# Patient Record
Sex: FEMALE | Race: WHITE | NOT HISPANIC OR LATINO | URBAN - METROPOLITAN AREA
[De-identification: names, ages, dates, MRNs, and addresses within clinical notes are randomized per-mention and may not be internally consistent; named-entity substitution may affect disease eponyms.]

---

## 2018-01-05 ENCOUNTER — INPATIENT (INPATIENT)
Facility: HOSPITAL | Age: 68
LOS: 3 days | Discharge: HOME | End: 2018-01-09
Attending: EMERGENCY MEDICINE | Admitting: HOSPITALIST
Payer: MEDICARE

## 2018-01-05 VITALS
HEIGHT: 65 IN | TEMPERATURE: 98 F | OXYGEN SATURATION: 98 % | SYSTOLIC BLOOD PRESSURE: 146 MMHG | WEIGHT: 123.02 LBS | RESPIRATION RATE: 16 BRPM | HEART RATE: 93 BPM | DIASTOLIC BLOOD PRESSURE: 70 MMHG

## 2018-01-05 DIAGNOSIS — Z98.89 OTHER SPECIFIED POSTPROCEDURAL STATES: Chronic | ICD-10-CM

## 2018-01-05 DIAGNOSIS — Z85.828 PERSONAL HISTORY OF OTHER MALIGNANT NEOPLASM OF SKIN: Chronic | ICD-10-CM

## 2018-01-05 DIAGNOSIS — Z94.0 KIDNEY TRANSPLANT STATUS: Chronic | ICD-10-CM

## 2018-01-05 DIAGNOSIS — C44.92 SQUAMOUS CELL CARCINOMA OF SKIN, UNSPECIFIED: ICD-10-CM

## 2018-01-05 DIAGNOSIS — I10 ESSENTIAL (PRIMARY) HYPERTENSION: ICD-10-CM

## 2018-01-05 DIAGNOSIS — Z29.9 ENCOUNTER FOR PROPHYLACTIC MEASURES, UNSPECIFIED: ICD-10-CM

## 2018-01-05 DIAGNOSIS — D25.9 LEIOMYOMA OF UTERUS, UNSPECIFIED: Chronic | ICD-10-CM

## 2018-01-05 DIAGNOSIS — L03.115 CELLULITIS OF RIGHT LOWER LIMB: ICD-10-CM

## 2018-01-05 DIAGNOSIS — Z94.0 KIDNEY TRANSPLANT STATUS: ICD-10-CM

## 2018-01-05 DIAGNOSIS — D84.9 IMMUNODEFICIENCY, UNSPECIFIED: ICD-10-CM

## 2018-01-05 DIAGNOSIS — E78.4 OTHER HYPERLIPIDEMIA: ICD-10-CM

## 2018-01-05 LAB
ALBUMIN SERPL ELPH-MCNC: 3 G/DL — LOW (ref 3.3–5)
ALP SERPL-CCNC: 78 U/L — SIGNIFICANT CHANGE UP (ref 40–120)
ALT FLD-CCNC: 17 U/L — SIGNIFICANT CHANGE UP (ref 12–78)
ANION GAP SERPL CALC-SCNC: 10 MMOL/L — SIGNIFICANT CHANGE UP (ref 5–17)
APTT BLD: 32.2 SEC — SIGNIFICANT CHANGE UP (ref 27.5–37.4)
AST SERPL-CCNC: 16 U/L — SIGNIFICANT CHANGE UP (ref 15–37)
BASOPHILS # BLD AUTO: 0 K/UL — SIGNIFICANT CHANGE UP (ref 0–0.2)
BASOPHILS NFR BLD AUTO: 0.3 % — SIGNIFICANT CHANGE UP (ref 0–2)
BILIRUB SERPL-MCNC: 0.5 MG/DL — SIGNIFICANT CHANGE UP (ref 0.2–1.2)
BUN SERPL-MCNC: 36 MG/DL — HIGH (ref 7–23)
CALCIUM SERPL-MCNC: 9.7 MG/DL — SIGNIFICANT CHANGE UP (ref 8.5–10.1)
CHLORIDE SERPL-SCNC: 106 MMOL/L — SIGNIFICANT CHANGE UP (ref 96–108)
CO2 SERPL-SCNC: 23 MMOL/L — SIGNIFICANT CHANGE UP (ref 22–31)
CREAT SERPL-MCNC: 1.88 MG/DL — HIGH (ref 0.5–1.3)
EOSINOPHIL # BLD AUTO: 0 K/UL — SIGNIFICANT CHANGE UP (ref 0–0.5)
EOSINOPHIL NFR BLD AUTO: 0.5 % — SIGNIFICANT CHANGE UP (ref 0–6)
ERYTHROCYTE [SEDIMENTATION RATE] IN BLOOD: 62 MM/HR — HIGH (ref 0–20)
GLUCOSE SERPL-MCNC: 151 MG/DL — HIGH (ref 70–99)
HCT VFR BLD CALC: 40 % — SIGNIFICANT CHANGE UP (ref 34.5–45)
HGB BLD-MCNC: 12.7 G/DL — SIGNIFICANT CHANGE UP (ref 11.5–15.5)
INR BLD: 1.01 RATIO — SIGNIFICANT CHANGE UP (ref 0.88–1.16)
LACTATE SERPL-SCNC: 1.3 MMOL/L — SIGNIFICANT CHANGE UP (ref 0.7–2)
LACTATE SERPL-SCNC: 2.2 MMOL/L — HIGH (ref 0.7–2)
LYMPHOCYTES # BLD AUTO: 1 K/UL — SIGNIFICANT CHANGE UP (ref 1–3.3)
LYMPHOCYTES # BLD AUTO: 10.6 % — LOW (ref 13–44)
MCHC RBC-ENTMCNC: 29.1 PG — SIGNIFICANT CHANGE UP (ref 27–34)
MCHC RBC-ENTMCNC: 31.8 GM/DL — LOW (ref 32–36)
MCV RBC AUTO: 91.6 FL — SIGNIFICANT CHANGE UP (ref 80–100)
MONOCYTES # BLD AUTO: 0.7 K/UL — SIGNIFICANT CHANGE UP (ref 0–0.9)
MONOCYTES NFR BLD AUTO: 6.9 % — SIGNIFICANT CHANGE UP (ref 2–14)
NEUTROPHILS # BLD AUTO: 7.8 K/UL — HIGH (ref 1.8–7.4)
NEUTROPHILS NFR BLD AUTO: 81.7 % — HIGH (ref 43–77)
PLATELET # BLD AUTO: 233 K/UL — SIGNIFICANT CHANGE UP (ref 150–400)
POTASSIUM SERPL-MCNC: 4.3 MMOL/L — SIGNIFICANT CHANGE UP (ref 3.5–5.3)
POTASSIUM SERPL-SCNC: 4.3 MMOL/L — SIGNIFICANT CHANGE UP (ref 3.5–5.3)
PROT SERPL-MCNC: 7.3 GM/DL — SIGNIFICANT CHANGE UP (ref 6–8.3)
PROTHROM AB SERPL-ACNC: 10.9 SEC — SIGNIFICANT CHANGE UP (ref 9.8–12.7)
RBC # BLD: 4.37 M/UL — SIGNIFICANT CHANGE UP (ref 3.8–5.2)
RBC # FLD: 14 % — SIGNIFICANT CHANGE UP (ref 10.3–14.5)
SODIUM SERPL-SCNC: 139 MMOL/L — SIGNIFICANT CHANGE UP (ref 135–145)
WBC # BLD: 9.6 K/UL — SIGNIFICANT CHANGE UP (ref 3.8–10.5)
WBC # FLD AUTO: 9.6 K/UL — SIGNIFICANT CHANGE UP (ref 3.8–10.5)

## 2018-01-05 PROCEDURE — 93971 EXTREMITY STUDY: CPT | Mod: 26,RT

## 2018-01-05 PROCEDURE — 73590 X-RAY EXAM OF LOWER LEG: CPT | Mod: 26,RT

## 2018-01-05 PROCEDURE — 99285 EMERGENCY DEPT VISIT HI MDM: CPT

## 2018-01-05 RX ORDER — VANCOMYCIN HCL 1 G
1000 VIAL (EA) INTRAVENOUS EVERY 12 HOURS
Qty: 0 | Refills: 0 | Status: DISCONTINUED | OUTPATIENT
Start: 2018-01-05 | End: 2018-01-06

## 2018-01-05 RX ORDER — SIMVASTATIN 20 MG/1
20 TABLET, FILM COATED ORAL AT BEDTIME
Qty: 0 | Refills: 0 | Status: DISCONTINUED | OUTPATIENT
Start: 2018-01-05 | End: 2018-01-09

## 2018-01-05 RX ORDER — HEPARIN SODIUM 5000 [USP'U]/ML
5000 INJECTION INTRAVENOUS; SUBCUTANEOUS EVERY 12 HOURS
Qty: 0 | Refills: 0 | Status: DISCONTINUED | OUTPATIENT
Start: 2018-01-05 | End: 2018-01-09

## 2018-01-05 RX ORDER — VANCOMYCIN HCL 1 G
1000 VIAL (EA) INTRAVENOUS ONCE
Qty: 0 | Refills: 0 | Status: COMPLETED | OUTPATIENT
Start: 2018-01-05 | End: 2018-01-05

## 2018-01-05 RX ORDER — SODIUM CHLORIDE 9 MG/ML
1000 INJECTION INTRAMUSCULAR; INTRAVENOUS; SUBCUTANEOUS
Qty: 0 | Refills: 0 | Status: DISCONTINUED | OUTPATIENT
Start: 2018-01-05 | End: 2018-01-07

## 2018-01-05 RX ORDER — SIROLIMUS 1 MG/ML
1 SOLUTION ORAL DAILY
Qty: 0 | Refills: 0 | Status: DISCONTINUED | OUTPATIENT
Start: 2018-01-05 | End: 2018-01-06

## 2018-01-05 RX ORDER — LOSARTAN POTASSIUM 100 MG/1
100 TABLET, FILM COATED ORAL DAILY
Qty: 0 | Refills: 0 | Status: DISCONTINUED | OUTPATIENT
Start: 2018-01-05 | End: 2018-01-09

## 2018-01-05 RX ORDER — ASPIRIN/CALCIUM CARB/MAGNESIUM 324 MG
81 TABLET ORAL DAILY
Qty: 0 | Refills: 0 | Status: DISCONTINUED | OUTPATIENT
Start: 2018-01-05 | End: 2018-01-09

## 2018-01-05 RX ORDER — AMLODIPINE BESYLATE 2.5 MG/1
5 TABLET ORAL DAILY
Qty: 0 | Refills: 0 | Status: DISCONTINUED | OUTPATIENT
Start: 2018-01-05 | End: 2018-01-09

## 2018-01-05 RX ORDER — SODIUM CHLORIDE 9 MG/ML
2000 INJECTION INTRAMUSCULAR; INTRAVENOUS; SUBCUTANEOUS ONCE
Qty: 0 | Refills: 0 | Status: COMPLETED | OUTPATIENT
Start: 2018-01-05 | End: 2018-01-05

## 2018-01-05 RX ORDER — ONDANSETRON 8 MG/1
4 TABLET, FILM COATED ORAL EVERY 6 HOURS
Qty: 0 | Refills: 0 | Status: DISCONTINUED | OUTPATIENT
Start: 2018-01-05 | End: 2018-01-09

## 2018-01-05 RX ORDER — PETROLATUM,WHITE
1 JELLY (GRAM) TOPICAL
Qty: 0 | Refills: 0 | Status: DISCONTINUED | OUTPATIENT
Start: 2018-01-05 | End: 2018-01-09

## 2018-01-05 RX ADMIN — Medication 250 MILLIGRAM(S): at 19:30

## 2018-01-05 RX ADMIN — SODIUM CHLORIDE 1000 MILLILITER(S): 9 INJECTION INTRAMUSCULAR; INTRAVENOUS; SUBCUTANEOUS at 18:00

## 2018-01-05 RX ADMIN — LOSARTAN POTASSIUM 100 MILLIGRAM(S): 100 TABLET, FILM COATED ORAL at 23:07

## 2018-01-05 RX ADMIN — HEPARIN SODIUM 5000 UNIT(S): 5000 INJECTION INTRAVENOUS; SUBCUTANEOUS at 23:07

## 2018-01-05 RX ADMIN — SIMVASTATIN 20 MILLIGRAM(S): 20 TABLET, FILM COATED ORAL at 23:07

## 2018-01-05 NOTE — H&P ADULT - ASSESSMENT
67F w/ PMH HTN, SCC of skin multiple over body, PCKD w/p renal transplant 2000, presents c/o worsening RLE cellulitis.  h/o MRSA    labs: LA 2.2-->1.3 after 2 L NS bolus  Cr 1.88, about baseline per pt   doppler of RLE is neg.   Given 1 dose vanco

## 2018-01-05 NOTE — ED ADULT TRIAGE NOTE - CHIEF COMPLAINT QUOTE
C/O right leg worsening infection. Patient reports her doctor Dr. Talita Patel advised her to come to ED if infection in RLE got worse. Patient reports she has MRSA. Currently on Doxycycline. Hx of Radiation to neck x 3 weeks ago, kidney recipient.

## 2018-01-05 NOTE — H&P ADULT - NEUROLOGICAL DETAILS
responds to verbal commands/responds to pain/alert and oriented x 3/normal strength/sensation intact/cranial nerves intact

## 2018-01-05 NOTE — ED PROVIDER NOTE - PMH
Hyperlipidemia    Hypertension    MVP (mitral valve prolapse)    Polycystic kidney disease    Raynaud's disease    Squamous cell carcinoma  multiple extensive lesions of torso-all extremities-face/scalp

## 2018-01-05 NOTE — H&P ADULT - SKIN COMMENTS
multiple areas of Skin SCC.  Radiation lesion over left neck. RLE open pustule w/ surrounding erythema, tenderness.  no visible rash

## 2018-01-05 NOTE — ED PROVIDER NOTE - OBJECTIVE STATEMENT
68 y/o female with a PMHx of HTN, squamous cell carcinoma, s/p kidney transplant (2000), Polycystic kidney disease on dialysis presents to the ED regarding worsening skin lesions on her right leg x3 days. Pt first noticed a small skin lesion 3 days ago, and then went to her dermatologist, Dr. Talita Patel 2 days ago where the pt was given doxycycline and Bacitracin. Pt was advised to come to ED if her lesion did not improve. Today, the pt noticed that the lesion worsened, and came to the ED after her PMD was not available. Pt reports increased erythema, stiffness, and trouble bearing weight on her right side. No SOB, abd pain, nausea, vomiting, diarrhea. Pt was seen at  ED one year ago for the same problem. Pt finished radiation 1 month ago after having 36 cervical lymph nodes removed on the left side. No anti-coagulants. Internist: Dr. Bello. Nephrologist: Dr. Plascencia. 66 y/o female with a PMHx of HTN, squamous cell carcinoma, s/p kidney transplant (2000), Polycystic kidney disease on dialysis presents to the ED regarding worsening skin lesions on her right leg x3 days. Pt first noticed a small skin lesion 3 days ago, and then went to her dermatologist, Dr. Talita Patel, 2 days ago where the pt was given doxycycline and Bacitracin. Pt was advised to come to ED if her lesions did not improve. Today, the pt noticed that the lesions worsened, and came to the ED after her PMD was not available. Pt reports increased erythema, stiffness, and trouble bearing weight on her right side. No SOB, abd pain, nausea, vomiting, diarrhea. Pt was seen at  ED one year ago for the same problem. Pt finished radiation 1 month ago after having 36 cervical lymph nodes removed on the left side. No anti-coagulants. Internist: Dr. Bello. Nephrologist: Dr. Plascencia.

## 2018-01-05 NOTE — H&P ADULT - NSHPLABSRESULTS_GEN_ALL_CORE
Labs personally reviewed.                          12.7   9.6   )-----------( 233      ( 05 Jan 2018 17:00 )             40.0       01-05    139  |  106  |  36<H>  ----------------------------<  151<H>  4.3   |  23  |  1.88<H>    Ca    9.7      05 Jan 2018 17:00    TPro  7.3  /  Alb  3.0<L>  /  TBili  0.5  /  DBili  x   /  AST  16  /  ALT  17  /  AlkPhos  78  01-05        PT/INR - ( 05 Jan 2018 17:00 )   PT: 10.9 sec;   INR: 1.01 ratio         PTT - ( 05 Jan 2018 17:00 )  PTT:32.2 sec    Lactate Trend  01-05 @ 20:55 Lactate:1.3   01-05 @ 17:08 Lactate:2.2     Imaging - pending tib/fib xray. RLE doppler neg for dvt

## 2018-01-05 NOTE — ED PROVIDER NOTE - PSH
Fibroid uterus    H/O lumpectomy  left  History of incisional hernia repair    History of kidney transplant  left  History of Mohs surgery for squamous cell carcinoma of skin  recent left arm procedure-multiple Moh's procedure in the past

## 2018-01-05 NOTE — H&P ADULT - HISTORY OF PRESENT ILLNESS
67F w/ PMH HTN, SCC of skin multiple over body, PCKD w/p renal transplant 2000, presents c/o worsening "mrsa" infection of Rt Leg.  4 days ago, pt noted a pustule over her Rt lower leg and opened it on her own.  She's had similar episode in the past that was diagnosed w/ MRSA.  She had an appt w/ her dermatologist 2 days ago and was prescribed doxycycline and bacitracin.  Pt presents today d/t worsening "redness"  and pain, she's unable to walk.  She denies fever, chills.      In ED: doppler of RLE is neg. Given 1 dose vanco    Vital Signs Last 24 Hrs  T(C): 36.6 (05 Jan 2018 21:48), Max: 36.7 (05 Jan 2018 18:02)  T(F): 97.9 (05 Jan 2018 21:48), Max: 98 (05 Jan 2018 18:02)  HR: 82 (05 Jan 2018 21:48) (80 - 93)  BP: 133/69 (05 Jan 2018 21:48) (121/61 - 146/70)  RR: 20 (05 Jan 2018 21:48) (16 - 20)  SpO2: 96% (05 Jan 2018 21:48) (96% - 98%)

## 2018-01-06 LAB
ANION GAP SERPL CALC-SCNC: 8 MMOL/L — SIGNIFICANT CHANGE UP (ref 5–17)
BUN SERPL-MCNC: 28 MG/DL — HIGH (ref 7–23)
CALCIUM SERPL-MCNC: 9.2 MG/DL — SIGNIFICANT CHANGE UP (ref 8.5–10.1)
CHLORIDE SERPL-SCNC: 112 MMOL/L — HIGH (ref 96–108)
CO2 SERPL-SCNC: 23 MMOL/L — SIGNIFICANT CHANGE UP (ref 22–31)
CREAT SERPL-MCNC: 1.38 MG/DL — HIGH (ref 0.5–1.3)
GLUCOSE SERPL-MCNC: 79 MG/DL — SIGNIFICANT CHANGE UP (ref 70–99)
HCT VFR BLD CALC: 37.8 % — SIGNIFICANT CHANGE UP (ref 34.5–45)
HCV AB S/CO SERPL IA: 0.19 S/CO — SIGNIFICANT CHANGE UP
HCV AB SERPL-IMP: SIGNIFICANT CHANGE UP
HGB BLD-MCNC: 12.1 G/DL — SIGNIFICANT CHANGE UP (ref 11.5–15.5)
LACTATE SERPL-SCNC: 0.9 MMOL/L — SIGNIFICANT CHANGE UP (ref 0.7–2)
MCHC RBC-ENTMCNC: 29 PG — SIGNIFICANT CHANGE UP (ref 27–34)
MCHC RBC-ENTMCNC: 32 GM/DL — SIGNIFICANT CHANGE UP (ref 32–36)
MCV RBC AUTO: 90.8 FL — SIGNIFICANT CHANGE UP (ref 80–100)
PLATELET # BLD AUTO: 176 K/UL — SIGNIFICANT CHANGE UP (ref 150–400)
POTASSIUM SERPL-MCNC: 3.7 MMOL/L — SIGNIFICANT CHANGE UP (ref 3.5–5.3)
POTASSIUM SERPL-SCNC: 3.7 MMOL/L — SIGNIFICANT CHANGE UP (ref 3.5–5.3)
RBC # BLD: 4.16 M/UL — SIGNIFICANT CHANGE UP (ref 3.8–5.2)
RBC # FLD: 13.8 % — SIGNIFICANT CHANGE UP (ref 10.3–14.5)
SODIUM SERPL-SCNC: 143 MMOL/L — SIGNIFICANT CHANGE UP (ref 135–145)
WBC # BLD: 5.8 K/UL — SIGNIFICANT CHANGE UP (ref 3.8–10.5)
WBC # FLD AUTO: 5.8 K/UL — SIGNIFICANT CHANGE UP (ref 3.8–10.5)

## 2018-01-06 RX ORDER — VANCOMYCIN HCL 1 G
750 VIAL (EA) INTRAVENOUS EVERY 24 HOURS
Qty: 0 | Refills: 0 | Status: DISCONTINUED | OUTPATIENT
Start: 2018-01-06 | End: 2018-01-09

## 2018-01-06 RX ORDER — CHOLECALCIFEROL (VITAMIN D3) 125 MCG
2000 CAPSULE ORAL DAILY
Qty: 0 | Refills: 0 | Status: DISCONTINUED | OUTPATIENT
Start: 2018-01-06 | End: 2018-01-09

## 2018-01-06 RX ORDER — METOPROLOL TARTRATE 50 MG
100 TABLET ORAL
Qty: 0 | Refills: 0 | Status: DISCONTINUED | OUTPATIENT
Start: 2018-01-06 | End: 2018-01-09

## 2018-01-06 RX ORDER — SIROLIMUS 1 MG/ML
0.5 SOLUTION ORAL DAILY
Qty: 0 | Refills: 0 | Status: DISCONTINUED | OUTPATIENT
Start: 2018-01-06 | End: 2018-01-07

## 2018-01-06 RX ADMIN — LOSARTAN POTASSIUM 100 MILLIGRAM(S): 100 TABLET, FILM COATED ORAL at 17:47

## 2018-01-06 RX ADMIN — Medication 1 APPLICATION(S): at 12:04

## 2018-01-06 RX ADMIN — SIMVASTATIN 20 MILLIGRAM(S): 20 TABLET, FILM COATED ORAL at 22:15

## 2018-01-06 RX ADMIN — HEPARIN SODIUM 5000 UNIT(S): 5000 INJECTION INTRAVENOUS; SUBCUTANEOUS at 17:46

## 2018-01-06 RX ADMIN — Medication 1 APPLICATION(S): at 17:46

## 2018-01-06 RX ADMIN — SODIUM CHLORIDE 125 MILLILITER(S): 9 INJECTION INTRAMUSCULAR; INTRAVENOUS; SUBCUTANEOUS at 17:46

## 2018-01-06 RX ADMIN — Medication 1 APPLICATION(S): at 22:18

## 2018-01-06 RX ADMIN — SODIUM CHLORIDE 125 MILLILITER(S): 9 INJECTION INTRAMUSCULAR; INTRAVENOUS; SUBCUTANEOUS at 08:24

## 2018-01-06 RX ADMIN — SIROLIMUS 1 MILLIGRAM(S): 1 SOLUTION ORAL at 09:35

## 2018-01-06 RX ADMIN — Medication 2000 UNIT(S): at 12:04

## 2018-01-06 RX ADMIN — Medication 5 MILLIGRAM(S): at 06:48

## 2018-01-06 RX ADMIN — HEPARIN SODIUM 5000 UNIT(S): 5000 INJECTION INTRAVENOUS; SUBCUTANEOUS at 06:46

## 2018-01-06 RX ADMIN — Medication 100 MILLIGRAM(S): at 12:04

## 2018-01-06 RX ADMIN — Medication 250 MILLIGRAM(S): at 06:50

## 2018-01-06 RX ADMIN — Medication 1 APPLICATION(S): at 08:21

## 2018-01-06 RX ADMIN — Medication 81 MILLIGRAM(S): at 09:29

## 2018-01-06 RX ADMIN — AMLODIPINE BESYLATE 5 MILLIGRAM(S): 2.5 TABLET ORAL at 17:46

## 2018-01-06 NOTE — CONSULT NOTE ADULT - ASSESSMENT
67F w/ PMH HTN, SCC of skin multiple over body, PCKD w/p renal transplant 2000, presents c/o worsening "mrsa" infection of Rt Leg.  4 days ago, pt noted a pustule over her Rt lower leg and opened it on her own.  She's had similar episode in the past that was diagnosed w/ MRSA.  She had an appt w/ her dermatologist 2 days ago and was prescribed doxycycline and bacitracin.  Pt presents today d/t worsening "redness"  and pain, she's unable to walk.  She denies fever, chills.  In ED: doppler of RLE is neg. Given 1 dose vanco    1. RLE cellulitis/hx of MRSA skin abscesses/lesions/renal tx on chronic immunosuppression/skin ca/CKD  - given 1 dose of vancomycin 1gm 1/5-1/6  - will continue with vancomycin, renally-dose adjusted [id number] based on crcl, check trough prior to 4th dose  - monitor for evolving abscess  - US (-)   - f/u cultures  - monitor temps  -tolerating abx well so far; no side effects noted  -reason for abx use and side effects reviewed with patient  - supportive care  - f/u cbc    2. other issues - care per medicine

## 2018-01-06 NOTE — CONSULT NOTE ADULT - SUBJECTIVE AND OBJECTIVE BOX
pt known to me with hx of CKD and renal transplant presents with a chief complaint of Right leg "mrsa"     HPI:  67F w/ PMH HTN, SCC of skin multiple sites over body, PCKD w s/p renal transplant 2000, presents c/o worsening RLE infection of Rt Leg.  4 days ago treated with doxycycline per her dermatologist,.  pt noted a pustule over her Rt lower leg and opened it on her own.  She's had similar episode in the past that was diagnosed w/ MRSA.  She had an appt w/ her dermatologist 2 days ago and was prescribed doxycycline and bacitracin.  Pt presents  d/t worsening "redness"  and pain, she's unable to walk.  She denies fever, chills.   In ED: doppler of RLE is neg. Given 1 dose vanco.     Seen at bedside, right lower extremity erythematous and warm with pustular lesion has improved  Offers no other complaints.      PAST MEDICAL & SURGICAL HISTORY:  MVP (mitral valve prolapse)  Raynaud's disease  Squamous cell carcinoma: multiple extensive lesions of torso-all extremities-face/scalp  Hypertension  Hyperlipidemia  Polycystic kidney disease  Fibroid uterus  H/O lumpectomy: left  History of incisional hernia repair  History of kidney transplant: left  History of Mohs surgery for squamous cell carcinoma of skin: recent left arm procedure-multiple Moh&#x27;s procedure in the past      MEDICATIONS  (STANDING):  amLODIPine   Tablet 5 milliGRAM(s) Oral daily  AQUAPHOR (petrolatum Ointment) 1 Application(s) Topical four times a day  aspirin enteric coated 81 milliGRAM(s) Oral daily  cholecalciferol 2000 Unit(s) Oral daily  heparin  Injectable 5000 Unit(s) SubCutaneous every 12 hours  losartan 100 milliGRAM(s) Oral daily  metoprolol     tartrate 100 milliGRAM(s) Oral two times a day  predniSONE   Tablet 5 milliGRAM(s) Oral daily  simvastatin 20 milliGRAM(s) Oral at bedtime  sirolimus 0.5 milliGRAM(s) Oral daily  sodium chloride 0.9%. 1000 milliLiter(s) (125 mL/Hr) IV Continuous <Continuous>  vancomycin  IVPB 750 milliGRAM(s) IV Intermittent every 24 hours      Allergies    bacitracin (Rash)  Levaquin (Sedation/Somnol; Muscle Pain)  tetracyclines (Other)    Intolerances    SOCIAL HISTORY:  Denies ETOh,Smoking,     FAMILY HISTORY:  No pertinent family history in first degree relatives      REVIEW OF SYSTEMS:    CONSTITUTIONAL: No weakness, fevers or chills  EYES/ENT: No visual changes;  No vertigo or throat pain   NECK: No pain or stiffness  RESPIRATORY: No cough, wheezing, hemoptysis; No shortness of breath  CARDIOVASCULAR: No chest pain or palpitations  GASTROINTESTINAL: No abdominal or epigastric pain. No nausea, vomiting, or hematemesis; No diarrhea or constipation. No melena or hematochezia.  GENITOURINARY: No dysuria, frequency or hematuria  NEUROLOGICAL: No numbness or weakness  SKIN: No itching, burning, rashes, or lesions   All other review of systems is negative unless indicated above.    VITAL:  T(C): , Max: 36.8 (01-06-18 @ 13:41)  T(F): , Max: 98.3 (01-06-18 @ 21:40)  HR: 83 (01-06-18 @ 21:40)  BP: 131/77 (01-06-18 @ 21:40)  BP(mean): --  RR: 18 (01-06-18 @ 21:40)  SpO2: 100% (01-06-18 @ 21:40)  Wt(kg): --    I and O's:    01-06 @ 07:01  -  01-06 @ 22:16  --------------------------------------------------------  IN: 700 mL / OUT: 0 mL / NET: 700 mL          PHYSICAL EXAM:    Constitutional: NAD  HEENT: PERRLA, EOMI,  MMM  Neck: No LAD, No JVD  Respiratory: CTAB  Cardiovascular: S1 and S2  Gastrointestinal: BS+, soft, NT/ND  Extremities: No peripheral edema  RLE 2 cm pustule with draining pus  Neurological: A/O x 3, no focal deficits  Psychiatric: Normal mood, normal affect  : No Manning  Skin: No rashes  Access: Not applicable    LABS:                        12.1   5.8   )-----------( 176      ( 06 Jan 2018 07:01 )             37.8       143    |  112    |  28     ----------------------------<  79        06 Jan 2018 07:01  3.7     |  23     |  1.38     139    |  106    |  36     ----------------------------<  151       05 Jan 2018 17:00  4.3     |  23     |  1.88     Ca    9.2        06 Jan 2018 07:01  Ca    9.7        05 Jan 2018 17:00    Urine Studies:    RADIOLOGY & ADDITIONAL STUDIES:

## 2018-01-06 NOTE — ED ADULT NURSE REASSESSMENT NOTE - NS ED NURSE REASSESS COMMENT FT1
Patient currently sleeping on stretcher awaiting med/surg bed.  Contact isolation maintained for MRSA of right LE.  Skin lesions all over body ZEENAT with no drainage noted as patient states from skin CA related to immunosuppressive medication from kidney transplant.

## 2018-01-06 NOTE — CONSULT NOTE ADULT - SUBJECTIVE AND OBJECTIVE BOX
Patient is a 67y old  Female who presents with a chief complaint of Right leg "mrsa" (05 Jan 2018 21:34)      HPI:  67F w/ PMH HTN, SCC of skin multiple over body, PCKD w/p renal transplant 2000, presents c/o worsening "mrsa" infection of Rt Leg.  4 days ago, pt noted a pustule over her Rt lower leg and opened it on her own.  She's had similar episode in the past that was diagnosed w/ MRSA.  She had an appt w/ her dermatologist 2 days ago and was prescribed doxycycline and bacitracin.  Pt presents today d/t worsening "redness"  and pain, she's unable to walk.  She denies fever, chills.  In ED: doppler of RLE is neg. Given 1 dose vanco    Vital Signs Last 24 Hrs  T(C): 36.6 (05 Jan 2018 21:48), Max: 36.7 (05 Jan 2018 18:02)  T(F): 97.9 (05 Jan 2018 21:48), Max: 98 (05 Jan 2018 18:02)  HR: 82 (05 Jan 2018 21:48) (80 - 93)  BP: 133/69 (05 Jan 2018 21:48) (121/61 - 146/70)  RR: 20 (05 Jan 2018 21:48) (16 - 20)  SpO2: 96% (05 Jan 2018 21:48) (96% - 98%) (05 Jan 2018 21:34)      PMH: as above    PSH: as above    Meds: per reconciliation sheet, noted below    MEDICATIONS  (STANDING):  amLODIPine   Tablet 5 milliGRAM(s) Oral daily  AQUAPHOR (petrolatum Ointment) 1 Application(s) Topical four times a day  aspirin enteric coated 81 milliGRAM(s) Oral daily  cholecalciferol 2000 Unit(s) Oral daily  heparin  Injectable 5000 Unit(s) SubCutaneous every 12 hours  losartan 100 milliGRAM(s) Oral daily  metoprolol     tartrate 100 milliGRAM(s) Oral two times a day  predniSONE   Tablet 5 milliGRAM(s) Oral daily  simvastatin 20 milliGRAM(s) Oral at bedtime  sirolimus 0.5 milliGRAM(s) Oral daily  sodium chloride 0.9%. 1000 milliLiter(s) (125 mL/Hr) IV Continuous <Continuous>  vancomycin  IVPB 750 milliGRAM(s) IV Intermittent every 24 hours      Allergies    bacitracin (Rash)  Levaquin (Sedation/Somnol; Muscle Pain)  tetracyclines (Other)    Intolerances        Social: no smoking, no alcohol, no illegal drugs; no recent travel, no exposure to TB    Family history:  No pertinent family history in first degree relatives      ROS: the patient denies fever, no chills, no HA, no dizziness, no sore throat, no blurry vision, no CP, no palpitations, no SOB, no cough, no abdominal pain, no diarrhea, no N/V, no dysuria, no leg pain, no claudication,  no rectal pain or bleeding, no night sweats    Vital Signs Last 24 Hrs  T(C): 36.8 (06 Jan 2018 13:41), Max: 36.8 (06 Jan 2018 13:41)  T(F): 98.2 (06 Jan 2018 13:41), Max: 98.2 (06 Jan 2018 13:41)  HR: 70 (06 Jan 2018 13:41) (70 - 108)  BP: 130/66 (06 Jan 2018 13:41) (121/61 - 146/70)  BP(mean): --  RR: 17 (06 Jan 2018 13:41) (16 - 20)  SpO2: 98% (06 Jan 2018 13:41) (96% - 100%)      PE:  Constitutional: frail looking  HEENT: NC/AT, EOMI, PERRLA, s/p LN resection s/p XRT along neck, clean edges  Neck: supple  Back: no tenderness  Respiratory: clear  Cardiovascular: S1S2 regular, no murmurs  Abdomen: soft, not tender, not distended, positive BS  Genitourinary: deferred  Rectal: deferred  Musculoskeletal: no muscle tenderness, no joint swelling or tenderness  Extremities: no pedal edema  Neurological: no focal deficits  Skin: sequelae from various skin cancers noted throughout body, RLE with some erythema, small yellow blister like lesion along shin and upper portion of shin with raised lesion, minimal fluctuance    Labs:                        12.1   5.8   )-----------( 176      ( 06 Jan 2018 07:01 )             37.8     01-06    143  |  112<H>  |  28<H>  ----------------------------<  79  3.7   |  23  |  1.38<H>    Ca    9.2      06 Jan 2018 07:01    TPro  7.3  /  Alb  3.0<L>  /  TBili  0.5  /  DBili  x   /  AST  16  /  ALT  17  /  AlkPhos  78  01-05     LIVER FUNCTIONS - ( 05 Jan 2018 17:00 )  Alb: 3.0 g/dL / Pro: 7.3 gm/dL / ALK PHOS: 78 U/L / ALT: 17 U/L / AST: 16 U/L / GGT: x                   Radiology:  < from: US Duplex Venous Lower Ext Ltd, Right (01.05.18 @ 18:54) >    EXAM:  US DPLX LWR EXT VEINS LTD RT                            PROCEDURE DATE:  01/05/2018          INTERPRETATION:  Exam Date: 1/5/2018 6:54 PM    Ultrasound of the right lower extremity deep venous system     CLINICAL INFORMATION: Right leg edema    TECHNIQUE:   Doppler ultrasonography of the right lower extremity deep   venous system was performed.  The veins evaluated included the common   femoral vein, the inflow of the greater saphenous vein, the superficial   femoral vein and the popliteal vein.      FINDINGS:    No previous examinations are available for review.    The visualized right lower extremity deep venous system demonstrated no   abnormality.  The veins were patent with intact Doppler flow.   This flow   varied with respiration.  Flow augmented with ipsilateral calf   compression. On transverse and longitudinal images no intraluminal   thrombus was found.  The veins were directly compressible by the   ultrasound transducer.            IMPRESSION:   Unremarkable ultrasound of the right lower extremity deep   venous system.           < end of copied text >      < from: Xray Tibia + Fibula 2 Views, Right (01.05.18 @ 21:31) >    EXAM:  CR TIB-FIB RIGHT                            PROCEDURE DATE:  01/05/2018          INTERPRETATION:  CR TIB-FIB RIGHT    HISTORY: eval for subcutaneous air, MRSA infection of the right leg    TECHNIQUE: AP and lateral radiographs of the right tibia-fibula;        COMPARISON: None     FINDINGS:       The cortex is intact. There is no evidence of displaced fracture.    The visualized knee and ankle joints are intact, no dislocation.    No localized soft tissue swelling. No subcutaneous air.    IMPRESSION:    No subcutaneous air.      < end of copied text >    Advanced directives addressed: full resuscitation

## 2018-01-06 NOTE — CONSULT NOTE ADULT - ASSESSMENT
66 yo female with PMHX of HTN, SCC, renal transplant in 2000, PCKD, CKD 3 w Cr ~ 1.6 - 1.7 , w recent diagnosis of SCC mets to LN of neck required local resection and completed recent radiation tx.  now presenting with rle pustule and cellulitis, w hx of MRSA in past    RLE cellultis failed outpt therapy    - DVT negative   - IV vanco per ID - monitor levels      CKD III ~ 1.7   - Cr stable near baseline     Renal transplant   - continue low sirolmus 0.5 mg daily and pred 5mg daily    - sirolimus goal ~ 3 per [facility] due to recurrent skin cancers    HTN    - losartan + metoprolol    Thank you for the courtesy of this consult. We will follow this patient with you.   Management is subject to change if new information becomes available or patient condition changes.

## 2018-01-07 LAB
ANION GAP SERPL CALC-SCNC: 7 MMOL/L — SIGNIFICANT CHANGE UP (ref 5–17)
BUN SERPL-MCNC: 25 MG/DL — HIGH (ref 7–23)
CALCIUM SERPL-MCNC: 9.4 MG/DL — SIGNIFICANT CHANGE UP (ref 8.5–10.1)
CHLORIDE SERPL-SCNC: 113 MMOL/L — HIGH (ref 96–108)
CO2 SERPL-SCNC: 24 MMOL/L — SIGNIFICANT CHANGE UP (ref 22–31)
CREAT SERPL-MCNC: 1.34 MG/DL — HIGH (ref 0.5–1.3)
GLUCOSE SERPL-MCNC: 81 MG/DL — SIGNIFICANT CHANGE UP (ref 70–99)
POTASSIUM SERPL-MCNC: 4.2 MMOL/L — SIGNIFICANT CHANGE UP (ref 3.5–5.3)
POTASSIUM SERPL-SCNC: 4.2 MMOL/L — SIGNIFICANT CHANGE UP (ref 3.5–5.3)
SIROLIMUS BLD-MCNC: 4.1 NG/ML — LOW (ref 4.5–14)
SODIUM SERPL-SCNC: 144 MMOL/L — SIGNIFICANT CHANGE UP (ref 135–145)

## 2018-01-07 RX ORDER — SODIUM CHLORIDE 9 MG/ML
1000 INJECTION INTRAMUSCULAR; INTRAVENOUS; SUBCUTANEOUS
Qty: 0 | Refills: 0 | Status: DISCONTINUED | OUTPATIENT
Start: 2018-01-07 | End: 2018-01-08

## 2018-01-07 RX ORDER — SIROLIMUS 1 MG/ML
0.5 SOLUTION ORAL DAILY
Qty: 0 | Refills: 0 | Status: DISCONTINUED | OUTPATIENT
Start: 2018-01-07 | End: 2018-01-09

## 2018-01-07 RX ADMIN — Medication 5 MILLIGRAM(S): at 07:07

## 2018-01-07 RX ADMIN — SIMVASTATIN 20 MILLIGRAM(S): 20 TABLET, FILM COATED ORAL at 21:04

## 2018-01-07 RX ADMIN — AMLODIPINE BESYLATE 5 MILLIGRAM(S): 2.5 TABLET ORAL at 07:08

## 2018-01-07 RX ADMIN — HEPARIN SODIUM 5000 UNIT(S): 5000 INJECTION INTRAVENOUS; SUBCUTANEOUS at 07:06

## 2018-01-07 RX ADMIN — SODIUM CHLORIDE 50 MILLILITER(S): 9 INJECTION INTRAMUSCULAR; INTRAVENOUS; SUBCUTANEOUS at 02:09

## 2018-01-07 RX ADMIN — Medication 1 APPLICATION(S): at 18:54

## 2018-01-07 RX ADMIN — Medication 100 MILLIGRAM(S): at 18:55

## 2018-01-07 RX ADMIN — Medication 1 APPLICATION(S): at 12:31

## 2018-01-07 RX ADMIN — SIROLIMUS 0.5 MILLIGRAM(S): 1 SOLUTION ORAL at 12:59

## 2018-01-07 RX ADMIN — Medication 2000 UNIT(S): at 12:29

## 2018-01-07 RX ADMIN — Medication 100 MILLIGRAM(S): at 07:15

## 2018-01-07 RX ADMIN — LOSARTAN POTASSIUM 100 MILLIGRAM(S): 100 TABLET, FILM COATED ORAL at 07:08

## 2018-01-07 RX ADMIN — Medication 1 APPLICATION(S): at 07:14

## 2018-01-07 RX ADMIN — HEPARIN SODIUM 5000 UNIT(S): 5000 INJECTION INTRAVENOUS; SUBCUTANEOUS at 18:56

## 2018-01-07 RX ADMIN — Medication 150 MILLIGRAM(S): at 07:05

## 2018-01-07 RX ADMIN — Medication 81 MILLIGRAM(S): at 12:29

## 2018-01-07 NOTE — PROGRESS NOTE ADULT - ASSESSMENT
68 yo female with PMHX of HTN, SCC, renal transplant in 2000, PCKD, CKD 3 w Cr ~ 1.6 - 1.7 , w recent diagnosis of SCC mets to LN of neck required local resection and completed recent radiation tx.  now presenting with rle pustule and cellulitis, w hx of MRSA in past    RLE cellultis failed outpt therapy - now blister? asbcess   - DVT negative   - IV vanco per ID - monitor levels per ID to adjust dose and duration    - Surgical eval for I&D?   - culture wound per ID      CKD III ~ 1.7   - Cr stable below  baseline     Renal transplant   - continue low sirolmus 0.5 mg daily and pred 5mg daily    - sirolimus goal ~ 3 per [facility] due to recurrent skin cancers   - sirolimus levels pending     HTN - borderline while on IVF   - losartan + metoprolol   - stop IVF    Thank you for the courtesy of this consult. We will follow this patient with you.   Management is subject to change if new information becomes available or patient condition changes. 68 yo female with PMHX of HTN, SCC, renal transplant in 2000, PCKD, CKD 3 w Cr ~ 1.6 - 1.7 , w recent diagnosis of SCC mets to LN of neck required local resection and completed recent radiation tx.  now presenting with rle pustule and cellulitis, w hx of MRSA in past    RLE cellultis failed outpt therapy - now blister? asbcess   - DVT negative   - IV vanco per ID - monitor levels per ID to adjust dose and duration    - Surgical eval for I&D?   - culture wound per ID      CKD III ~ 1.7   - Cr stable below  baseline     Renal transplant   - continue low sirolmus 0.5 mg daily and pred 5mg daily    - sirolimus goal ~ 3 per [facility] due to recurrent skin cancers   - sirolimus levels pending     HTN - borderline while on IVF   - losartan + metoprolol    Thank you for the courtesy of this consult. We will follow this patient with you.   Management is subject to change if new information becomes available or patient condition changes.

## 2018-01-07 NOTE — PATIENT PROFILE ADULT. - VISION (WITH CORRECTIVE LENSES IF THE PATIENT USUALLY WEARS THEM):
for reading/Partially impaired: cannot see medication labels or newsprint, but can see obstacles in path, and the surrounding layout; can count fingers at arm's length

## 2018-01-08 LAB
ALBUMIN SERPL ELPH-MCNC: 2.8 G/DL — LOW (ref 3.3–5)
ANION GAP SERPL CALC-SCNC: 8 MMOL/L — SIGNIFICANT CHANGE UP (ref 5–17)
BUN SERPL-MCNC: 25 MG/DL — HIGH (ref 7–23)
CALCIUM SERPL-MCNC: 9.6 MG/DL — SIGNIFICANT CHANGE UP (ref 8.5–10.1)
CHLORIDE SERPL-SCNC: 110 MMOL/L — HIGH (ref 96–108)
CO2 SERPL-SCNC: 25 MMOL/L — SIGNIFICANT CHANGE UP (ref 22–31)
CREAT SERPL-MCNC: 1.37 MG/DL — HIGH (ref 0.5–1.3)
GLUCOSE SERPL-MCNC: 70 MG/DL — SIGNIFICANT CHANGE UP (ref 70–99)
HCT VFR BLD CALC: 39.7 % — SIGNIFICANT CHANGE UP (ref 34.5–45)
HGB BLD-MCNC: 12.5 G/DL — SIGNIFICANT CHANGE UP (ref 11.5–15.5)
MCHC RBC-ENTMCNC: 28.8 PG — SIGNIFICANT CHANGE UP (ref 27–34)
MCHC RBC-ENTMCNC: 31.5 GM/DL — LOW (ref 32–36)
MCV RBC AUTO: 91.5 FL — SIGNIFICANT CHANGE UP (ref 80–100)
PHOSPHATE SERPL-MCNC: 3 MG/DL — SIGNIFICANT CHANGE UP (ref 2.5–4.5)
PLATELET # BLD AUTO: 220 K/UL — SIGNIFICANT CHANGE UP (ref 150–400)
POTASSIUM SERPL-MCNC: 3.8 MMOL/L — SIGNIFICANT CHANGE UP (ref 3.5–5.3)
POTASSIUM SERPL-SCNC: 3.8 MMOL/L — SIGNIFICANT CHANGE UP (ref 3.5–5.3)
RBC # BLD: 4.34 M/UL — SIGNIFICANT CHANGE UP (ref 3.8–5.2)
RBC # FLD: 13.8 % — SIGNIFICANT CHANGE UP (ref 10.3–14.5)
SIROLIMUS BLD-MCNC: 5.4 NG/ML — SIGNIFICANT CHANGE UP (ref 4.5–14)
SODIUM SERPL-SCNC: 143 MMOL/L — SIGNIFICANT CHANGE UP (ref 135–145)
WBC # BLD: 6.5 K/UL — SIGNIFICANT CHANGE UP (ref 3.8–10.5)
WBC # FLD AUTO: 6.5 K/UL — SIGNIFICANT CHANGE UP (ref 3.8–10.5)

## 2018-01-08 RX ORDER — LACTOBACILLUS ACIDOPHILUS 100MM CELL
1 CAPSULE ORAL DAILY
Qty: 0 | Refills: 0 | Status: DISCONTINUED | OUTPATIENT
Start: 2018-01-08 | End: 2018-01-09

## 2018-01-08 RX ADMIN — Medication 81 MILLIGRAM(S): at 12:16

## 2018-01-08 RX ADMIN — AMLODIPINE BESYLATE 5 MILLIGRAM(S): 2.5 TABLET ORAL at 05:03

## 2018-01-08 RX ADMIN — Medication 1 APPLICATION(S): at 17:43

## 2018-01-08 RX ADMIN — SIMVASTATIN 20 MILLIGRAM(S): 20 TABLET, FILM COATED ORAL at 22:51

## 2018-01-08 RX ADMIN — LOSARTAN POTASSIUM 100 MILLIGRAM(S): 100 TABLET, FILM COATED ORAL at 05:02

## 2018-01-08 RX ADMIN — Medication 1 APPLICATION(S): at 05:09

## 2018-01-08 RX ADMIN — Medication 1 APPLICATION(S): at 22:52

## 2018-01-08 RX ADMIN — SIROLIMUS 0.5 MILLIGRAM(S): 1 SOLUTION ORAL at 12:16

## 2018-01-08 RX ADMIN — Medication 150 MILLIGRAM(S): at 05:09

## 2018-01-08 RX ADMIN — Medication 1 TABLET(S): at 13:51

## 2018-01-08 RX ADMIN — Medication 5 MILLIGRAM(S): at 05:02

## 2018-01-08 RX ADMIN — Medication 1 APPLICATION(S): at 12:16

## 2018-01-08 RX ADMIN — Medication 2000 UNIT(S): at 12:16

## 2018-01-08 RX ADMIN — Medication 100 MILLIGRAM(S): at 17:42

## 2018-01-08 RX ADMIN — Medication 100 MILLIGRAM(S): at 05:03

## 2018-01-08 NOTE — PROGRESS NOTE ADULT - ASSESSMENT
67F w/ PMH HTN, SCC of skin multiple over body, PCKD w/p renal transplant 2000, presents c/o worsening "mrsa" infection of Rt Leg.  4 days ago, pt noted a pustule over her Rt lower leg and opened it on her own.  She's had similar episode in the past that was diagnosed w/ MRSA.  She had an appt w/ her dermatologist 2 days ago and was prescribed doxycycline and bacitracin.  Pt presents today d/t worsening "redness"  and pain, she's unable to walk.  She denies fever, chills.  In ED: doppler of RLE is neg. Given 1 dose vanco    1. RLE cellulitis/hx of MRSA skin abscesses/lesions/renal tx on chronic immunosuppression/skin ca/CKD  - improving  - given 1 dose of vancomycin 1gm 1/5-1/6  - will continue with vancomycin, renally-dose adjusted [id number]  - check trough   - US (-)   - blood cx no growth  - cellulitis now forming blister/abscess which will likely spontaneously drain  - once improved in next 24-48 hours, can switch to po doxycycline 100mg BID to complete 10 day course  - monitor temps  -tolerating abx well so far; no side effects noted  -reason for abx use and side effects reviewed with patient  - supportive care  - f/u cbc    2. other issues - care per medicine

## 2018-01-08 NOTE — PROGRESS NOTE ADULT - ASSESSMENT
66 yo female with PMHX of HTN, SCC, renal transplant in 2000, PCKD, CKD 3 w Cr ~ 1.6 - 1.7 , w recent diagnosis of SCC mets to LN of neck required local resection and completed recent radiation tx.  now presenting with rle pustule and cellulitis, w hx of MRSA in past    RLE cellultis failed outpt therapy - now blister/abscess   - DVT negative   - IV vanco per ID - monitor levels per ID to adjust dose and duration    - Surgical eval for I&D?   - culture wound per ID      CKD III ~ 1.7   - Cr stable below  baseline     Renal transplant   - continue low sirolmus 0.5 mg daily and pred 5mg daily    - sirolimus goal ~ 3 per [facility] due to recurrent skin cancers   - sirolimus levels drawn too early - not TRUE trough - adjust timing of next draw tomorrow     HTN - stable now off IVF   - losartan + metoprolol    Thank you for the courtesy of this consult. We will follow this patient with you.   Management is subject to change if new information becomes available or patient condition changes.

## 2018-01-08 NOTE — PROGRESS NOTE ADULT - SUBJECTIVE AND OBJECTIVE BOX
Patient is a 67y old  Female who presents with a chief complaint of Right leg "mrsa" (05 Jan 2018 21:34)      HPI:  67F w/ PMH HTN, SCC of skin multiple over body, PCKD w/p renal transplant 2000, presents c/o worsening "mrsa" infection of Rt Leg.  4 days ago, pt noted a pustule over her Rt lower leg and opened it on her own.  She's had similar episode in the past that was diagnosed w/ MRSA.  She had an appt w/ her dermatologist 2 days ago and was prescribed doxycycline and bacitracin.  Pt presents today d/t worsening "redness"  and pain, she's unable to walk.  She denies fever, chills.   In ED: doppler of RLE is neg. Given 1 dose vanco.    1/6: Seen at bedside, right lower extremity erythematous and warm with pustular lesion. Offers no other complaints.  1/7: Lower extremity is less tender, papula with surrounding erythema still present.  Tolerating abx.    REVIEW OF SYSTEMS: All other review of systems is negative unless indicated above.    Vital Signs Last 24 Hrs  T(C): 36.7 (07 Jan 2018 05:43), Max: 36.8 (06 Jan 2018 17:41)  T(F): 98 (07 Jan 2018 05:43), Max: 98.3 (06 Jan 2018 21:40)  HR: 87 (07 Jan 2018 05:43) (80 - 87)  BP: 136/66 (07 Jan 2018 05:43) (131/77 - 137/69)  BP(mean): --  RR: 16 (07 Jan 2018 05:43) (16 - 18)  SpO2: 100% (06 Jan 2018 21:40) (100% - 100%)    PHYSICAL EXAM    Constitutional: NAD, awake and alert, well-developed  HEENT: PERR, EOMI, Normal Hearing, MMM  Neck: Soft and supple  Respiratory: Breath sounds are clear bilaterally, No wheezing, rales or rhonchi  Cardiovascular: S1 and S2, regular rate and rhythm, no Murmurs, gallops or rubs  Gastrointestinal: Bowel Sounds present, soft, nontender, nondistended, no guarding, no rebound  Extremities: right LE w/ pustule with surround erythema, and warmth, non-tender.  Neurological: A/O x 3, no focal deficits  Skin: No rashes      MEDICATIONS  (STANDING):  amLODIPine   Tablet 5 milliGRAM(s) Oral daily  AQUAPHOR (petrolatum Ointment) 1 Application(s) Topical four times a day  aspirin enteric coated 81 milliGRAM(s) Oral daily  cholecalciferol 2000 Unit(s) Oral daily  heparin  Injectable 5000 Unit(s) SubCutaneous every 12 hours  losartan 100 milliGRAM(s) Oral daily  metoprolol     tartrate 100 milliGRAM(s) Oral two times a day  predniSONE   Tablet 5 milliGRAM(s) Oral daily  simvastatin 20 milliGRAM(s) Oral at bedtime  sirolimus 0.5 milliGRAM(s) Oral daily  sodium chloride 0.9%. 1000 milliLiter(s) (50 mL/Hr) IV Continuous <Continuous>  vancomycin  IVPB 750 milliGRAM(s) IV Intermittent every 24 hours    MEDICATIONS  (PRN):  ondansetron Injectable 4 milliGRAM(s) IV Push every 6 hours PRN Nausea                              12.1   5.8   )-----------( 176      ( 06 Jan 2018 07:01 )             37.8     01-07    144  |  113<H>  |  25<H>  ----------------------------<  81  4.2   |  24  |  1.34<H>    Ca    9.4      07 Jan 2018 07:28    TPro  7.3  /  Alb  3.0<L>  /  TBili  0.5  /  DBili  x   /  AST  16  /  ALT  17  /  AlkPhos  78  01-05    CAPILLARY BLOOD GLUCOSE        LIVER FUNCTIONS - ( 05 Jan 2018 17:00 )  Alb: 3.0 g/dL / Pro: 7.3 gm/dL / ALK PHOS: 78 U/L / ALT: 17 U/L / AST: 16 U/L / GGT: x           PT/INR - ( 05 Jan 2018 17:00 )   PT: 10.9 sec;   INR: 1.01 ratio         PTT - ( 05 Jan 2018 17:00 )  PTT:32.2 sec              Assessment and Plan:  67F w/ PMH HTN, SCC of skin multiple over body, PCKD w/p renal transplant 2000, presents c/o worsening RLE cellulitis.  h/o MRSA    Cellulitis of right lower extremity.    -LA 2.2-->1.3 after 2 L NS bolus  -c/w IV vanco to cover for MRSA  -f/u blood cultures  -ID consult appreciated - c/w Vanco (renal dose)  -doppler of RLE is neg.     Problem/Plan - 2:  ·  Problem: Immunocompromised.  Plan: pt on sirolimus/prednisone for transplant  monitor for worsening infection.     Problem/Plan - 3:  ·  Problem: Renal transplant recipient.  Plan: renal consult to follow and manage renal issues  c/w sirolimus, prednisone.  f/u levels.    Problem/Plan - 4:  ·  Problem: Squamous cell carcinoma.  Plan: outpt f/u w/ derm- consult if needed.     Problem/Plan - 5:  ·  Problem: Essential hypertension.  Plan: bp controlled, c/w losartan, norvasc, metoprolol home doses and monitor bp.     Problem/Plan - 6:  Problem: Other hyperlipidemia. Plan: c/w statin.    Problem/Plan - 7:  ·  Problem: Prophylactic measure.  Plan: sq heparin.       Attending Statement:  40 minutes spent on total encounter; more than 50% of the visit was spent counseling and/or coordinating care by the attending physician.
67F w/ PMH HTN, SCC of skin multiple over body, PCKD w/p renal transplant 2000, presents c/o worsening "mrsa" infection of Rt Leg.  4 days ago, pt noted a pustule over her Rt lower leg and opened it on her own.  She's had similar episode in the past that was diagnosed w/ MRSA.  She had an appt w/ her dermatologist 2 days ago and was prescribed doxycycline and bacitracin.  Pt presents today d/t worsening "redness"  and pain, she's unable to walk.  She denies fever, chills.  In ED: doppler of RLE is neg. Given 1 dose vanco      no fevers  feels well  RLE improving        MEDICATIONS  (STANDING):  amLODIPine   Tablet 5 milliGRAM(s) Oral daily  AQUAPHOR (petrolatum Ointment) 1 Application(s) Topical four times a day  aspirin enteric coated 81 milliGRAM(s) Oral daily  cholecalciferol 2000 Unit(s) Oral daily  heparin  Injectable 5000 Unit(s) SubCutaneous every 12 hours  losartan 100 milliGRAM(s) Oral daily  metoprolol     tartrate 100 milliGRAM(s) Oral two times a day  predniSONE   Tablet 5 milliGRAM(s) Oral daily  simvastatin 20 milliGRAM(s) Oral at bedtime  sirolimus 0.5 milliGRAM(s) Oral daily  sodium chloride 0.9%. 1000 milliLiter(s) (50 mL/Hr) IV Continuous <Continuous>  vancomycin  IVPB 750 milliGRAM(s) IV Intermittent every 24 hours      Vital Signs Last 24 Hrs  T(C): 36.5 (08 Jan 2018 11:48), Max: 36.9 (07 Jan 2018 16:04)  T(F): 97.7 (08 Jan 2018 11:48), Max: 98.5 (07 Jan 2018 21:06)  HR: 68 (08 Jan 2018 05:28) (68 - 87)  BP: 132/67 (08 Jan 2018 11:48) (127/56 - 143/72)  BP(mean): --  RR: 18 (08 Jan 2018 11:48) (16 - 18)  SpO2: 99% (08 Jan 2018 05:28) (97% - 99%)          PE:  Constitutional: frail looking  HEENT: NC/AT, EOMI, PERRLA, s/p LN resection s/p XRT along neck, clean edges  Neck: supple  Back: no tenderness  Respiratory: clear  Cardiovascular: S1S2 regular, no murmurs  Abdomen: soft, not tender, not distended, positive BS  Genitourinary: deferred  Rectal: deferred  Musculoskeletal: no muscle tenderness, no joint swelling or tenderness  Extremities: no pedal edema  Neurological: no focal deficits  Skin: sequelae from various skin cancers noted throughout body, RLE with erythema and now small 1-2 cm fluctuant lesion    Labs:                                     12.5   6.5   )-----------( 220      ( 08 Jan 2018 05:23 )             39.7     01-08    143  |  110<H>  |  25<H>  ----------------------------<  70  3.8   |  25  |  1.37<H>    Ca    9.6      08 Jan 2018 05:23  Phos  3.0     01-08    TPro  x   /  Alb  2.8<L>  /  TBili  x   /  DBili  x   /  AST  x   /  ALT  x   /  AlkPhos  x   01-08           Cultures:         Culture - Blood (01.05.18 @ 17:08)    Specimen Source: .Blood Blood-Peripheral    Culture Results:   No growth to date.    Culture - Blood (01.05.18 @ 17:08)    Specimen Source: .Blood Blood-Peripheral    Culture Results:   No growth to date.      Radiology:  < from: US Duplex Venous Lower Ext Ltd, Right (01.05.18 @ 18:54) >    EXAM:  US DPLX LWR EXT VEINS LTD RT                            PROCEDURE DATE:  01/05/2018          INTERPRETATION:  Exam Date: 1/5/2018 6:54 PM    Ultrasound of the right lower extremity deep venous system     CLINICAL INFORMATION: Right leg edema    TECHNIQUE:   Doppler ultrasonography of the right lower extremity deep   venous system was performed.  The veins evaluated included the common   femoral vein, the inflow of the greater saphenous vein, the superficial   femoral vein and the popliteal vein.      FINDINGS:    No previous examinations are available for review.    The visualized right lower extremity deep venous system demonstrated no   abnormality.  The veins were patent with intact Doppler flow.   This flow   varied with respiration.  Flow augmented with ipsilateral calf   compression. On transverse and longitudinal images no intraluminal   thrombus was found.  The veins were directly compressible by the   ultrasound transducer.            IMPRESSION:   Unremarkable ultrasound of the right lower extremity deep   venous system.           < end of copied text >      < from: Xray Tibia + Fibula 2 Views, Right (01.05.18 @ 21:31) >    EXAM:  CR TIB-FIB RIGHT                            PROCEDURE DATE:  01/05/2018          INTERPRETATION:  CR TIB-FIB RIGHT    HISTORY: eval for subcutaneous air, MRSA infection of the right leg    TECHNIQUE: AP and lateral radiographs of the right tibia-fibula;        COMPARISON: None     FINDINGS:       The cortex is intact. There is no evidence of displaced fracture.    The visualized knee and ankle joints are intact, no dislocation.    No localized soft tissue swelling. No subcutaneous air.    IMPRESSION:    No subcutaneous air.      < end of copied text >    Advanced directives addressed: full resuscitation
Patient is a 67y old  Female who presents with a chief complaint of Right leg "mrsa" (05 Jan 2018 21:34)      HPI:  67F w/ PMH HTN, SCC of skin multiple over body, PCKD w/p renal transplant 2000, presents c/o worsening "mrsa" infection of Rt Leg.  4 days ago, pt noted a pustule over her Rt lower leg and opened it on her own.  She's had similar episode in the past that was diagnosed w/ MRSA.  She had an appt w/ her dermatologist 2 days ago and was prescribed doxycycline and bacitracin.  Pt presents today d/t worsening "redness"  and pain, she's unable to walk.  She denies fever, chills.   In ED: doppler of RLE is neg. Given 1 dose vanco.    1/6: Seen at bedside, right lower extremity erythematous and warm with pustular lesion. Offers no other complaints.    REVIEW OF SYSTEMS: All other review of systems is negative unless indicated above.    Vital Signs Last 24 Hrs  T(C): 36.6 (06 Jan 2018 06:09), Max: 36.7 (05 Jan 2018 18:02)  T(F): 97.8 (06 Jan 2018 06:09), Max: 98 (05 Jan 2018 18:02)  HR: 98 (06 Jan 2018 06:09) (80 - 98)  BP: 143/61 (06 Jan 2018 06:09) (121/61 - 146/70)  BP(mean): --  RR: 17 (06 Jan 2018 06:09) (16 - 20)  SpO2: 100% (06 Jan 2018 06:09) (96% - 100%)    PHYSICAL EXAM    Constitutional: NAD, awake and alert, well-developed  HEENT: PERR, EOMI, Normal Hearing, MMM  Neck: Soft and supple  Respiratory: Breath sounds are clear bilaterally, No wheezing, rales or rhonchi  Cardiovascular: S1 and S2, regular rate and rhythm, no Murmurs, gallops or rubs  Gastrointestinal: Bowel Sounds present, soft, nontender, nondistended, no guarding, no rebound  Extremities: right LE w/ pustule with surround erythema, and warmth, non-tender.  Neurological: A/O x 3, no focal deficits  Skin: No rashes      MEDICATIONS  (STANDING):  amLODIPine   Tablet 5 milliGRAM(s) Oral daily  AQUAPHOR (petrolatum Ointment) 1 Application(s) Topical four times a day  aspirin enteric coated 81 milliGRAM(s) Oral daily  heparin  Injectable 5000 Unit(s) SubCutaneous every 12 hours  losartan 100 milliGRAM(s) Oral daily  metoprolol     tartrate 100 milliGRAM(s) Oral two times a day  predniSONE   Tablet 5 milliGRAM(s) Oral daily  simvastatin 20 milliGRAM(s) Oral at bedtime  sirolimus 0.5 milliGRAM(s) Oral daily  sodium chloride 0.9%. 1000 milliLiter(s) (125 mL/Hr) IV Continuous <Continuous>  vancomycin  IVPB 1000 milliGRAM(s) IV Intermittent every 12 hours    MEDICATIONS  (PRN):  ondansetron Injectable 4 milliGRAM(s) IV Push every 6 hours PRN Nausea                              12.1   5.8   )-----------( 176      ( 06 Jan 2018 07:01 )             37.8     01-06    143  |  112<H>  |  28<H>  ----------------------------<  79  3.7   |  23  |  1.38<H>    Ca    9.2      06 Jan 2018 07:01    TPro  7.3  /  Alb  3.0<L>  /  TBili  0.5  /  DBili  x   /  AST  16  /  ALT  17  /  AlkPhos  78  01-05    CAPILLARY BLOOD GLUCOSE        LIVER FUNCTIONS - ( 05 Jan 2018 17:00 )  Alb: 3.0 g/dL / Pro: 7.3 gm/dL / ALK PHOS: 78 U/L / ALT: 17 U/L / AST: 16 U/L / GGT: x           PT/INR - ( 05 Jan 2018 17:00 )   PT: 10.9 sec;   INR: 1.01 ratio         PTT - ( 05 Jan 2018 17:00 )  PTT:32.2 sec      Assessment and Plan:   Assessment:  · Assessment		  67F w/ PMH HTN, SCC of skin multiple over body, PCKD w/p renal transplant 2000, presents c/o worsening RLE cellulitis.  h/o MRSA    labs: LA 2.2-->1.3 after 2 L NS bolus  Cr 1.88, about baseline per pt   doppler of RLE is neg.   Given 1 dose vanco    Problem/Plan - 1:  ·  Problem: Cellulitis of right lower extremity.  Plan: IV vanco to cover for MRSA  f/u blood cultures  ID consult   monitor resolution.     Problem/Plan - 2:  ·  Problem: Immunocompromised.  Plan: pt on sirolimus/prednisone for transplant  monitor for worsening infection.     Problem/Plan - 3:  ·  Problem: Renal transplant recipient.  Plan: renal consult to follow and manage renal issues  c/w sirolimus, prednisone.     Problem/Plan - 4:  ·  Problem: Squamous cell carcinoma.  Plan: outpt f/u w/ derm- consult if needed.     Problem/Plan - 5:  ·  Problem: Essential hypertension.  Plan: bp controlled, c/w losartan, norvasc, metoprolol home doses and monitor bp.     Problem/Plan - 6:  Problem: Other hyperlipidemia. Plan: c/w statin.    Problem/Plan - 7:  ·  Problem: Prophylactic measure.  Plan: sq heparin.       Attending Statement:  40 minutes spent on total encounter; more than 50% of the visit was spent counseling and/or coordinating care by the attending physician.
Patient is a 67y old  Female who presents with a chief complaint of Right leg "mrsa" (05 Jan 2018 21:34)      HPI:  67F w/ PMH HTN, SCC of skin multiple over body, PCKD w/p renal transplant 2000, presents c/o worsening "mrsa" infection of Rt Leg.  4 days ago, pt noted a pustule over her Rt lower leg and opened it on her own.  She's had similar episode in the past that was diagnosed w/ MRSA.  She had an appt w/ her dermatologist 2 days ago and was prescribed doxycycline and bacitracin.  Pt presents today d/t worsening "redness"  and pain, she's unable to walk.  She denies fever, chills.   In ED: doppler of RLE is neg. Given 1 dose vanco.    1/6: Seen at bedside, right lower extremity erythematous and warm with pustular lesion. Offers no other complaints.  1/7: Lower extremity is less tender, papula with surrounding erythema still present.  Tolerating abx.  1/8: Lower extremity improving. Less tender.      REVIEW OF SYSTEMS: All other review of systems is negative unless indicated above.    Vital Signs Last 24 Hrs  T(C): 36.5 (08 Jan 2018 11:48), Max: 36.9 (07 Jan 2018 16:04)  T(F): 97.7 (08 Jan 2018 11:48), Max: 98.5 (07 Jan 2018 21:06)  HR: 68 (08 Jan 2018 05:28) (68 - 87)  BP: 132/67 (08 Jan 2018 11:48) (127/56 - 143/72)  BP(mean): --  RR: 18 (08 Jan 2018 11:48) (16 - 18)  SpO2: 99% (08 Jan 2018 05:28) (97% - 99%)    PHYSICAL EXAM    Constitutional: NAD, awake and alert, well-developed  HEENT: PERR, EOMI, Normal Hearing, MMM  Neck: Soft and supple  Respiratory: Breath sounds are clear bilaterally, No wheezing, rales or rhonchi  Cardiovascular: S1 and S2, regular rate and rhythm, no Murmurs, gallops or rubs  Gastrointestinal: Bowel Sounds present, soft, nontender, nondistended, no guarding, no rebound  Extremities: right LE w/ pustule with surround erythema, and warmth, non-tender.  Neurological: A/O x 3, no focal deficits  Skin: No rashes      MEDICATIONS  (STANDING):  amLODIPine   Tablet 5 milliGRAM(s) Oral daily  AQUAPHOR (petrolatum Ointment) 1 Application(s) Topical four times a day  aspirin enteric coated 81 milliGRAM(s) Oral daily  cholecalciferol 2000 Unit(s) Oral daily  heparin  Injectable 5000 Unit(s) SubCutaneous every 12 hours  lactobacillus acidophilus 1 Tablet(s) Oral daily  losartan 100 milliGRAM(s) Oral daily  metoprolol     tartrate 100 milliGRAM(s) Oral two times a day  predniSONE   Tablet 5 milliGRAM(s) Oral daily  simvastatin 20 milliGRAM(s) Oral at bedtime  sirolimus 0.5 milliGRAM(s) Oral daily  vancomycin  IVPB 750 milliGRAM(s) IV Intermittent every 24 hours    MEDICATIONS  (PRN):  ondansetron Injectable 4 milliGRAM(s) IV Push every 6 hours PRN Nausea                              12.5   6.5   )-----------( 220      ( 08 Jan 2018 05:23 )             39.7     01-08    143  |  110<H>  |  25<H>  ----------------------------<  70  3.8   |  25  |  1.37<H>    Ca    9.6      08 Jan 2018 05:23  Phos  3.0     01-08    TPro  x   /  Alb  2.8<L>  /  TBili  x   /  DBili  x   /  AST  x   /  ALT  x   /  AlkPhos  x   01-08    CAPILLARY BLOOD GLUCOSE        LIVER FUNCTIONS - ( 08 Jan 2018 05:23 )  Alb: 2.8 g/dL / Pro: x     / ALK PHOS: x     / ALT: x     / AST: x     / GGT: x                 Assessment and Plan:  67F w/ PMH HTN, SCC of skin multiple over body, PCKD w/p renal transplant 2000, presents c/o worsening RLE cellulitis.  h/o MRSA    Cellulitis of right lower extremity.    -LA 2.2-->1.3 after 2 L NS bolus  -c/w IV vanco to cover for MRSA  -f/u blood cultures  -ID consult appreciated - c/w Vanco (renal dose)  -doppler of RLE is neg.     Problem/Plan - 2:  ·  Problem: Immunocompromised.  Plan: pt on sirolimus/prednisone for transplant  monitor for worsening infection.     Problem/Plan - 3:  ·  Problem: Renal transplant recipient.  Plan: renal consult to follow and manage renal issues  c/w sirolimus, prednisone.  f/u levels.    Problem/Plan - 4:  ·  Problem: Squamous cell carcinoma.  Plan: outpt f/u w/ derm- consult if needed.     Problem/Plan - 5:  ·  Problem: Essential hypertension.  Plan: bp controlled, c/w losartan, norvasc, metoprolol home doses and monitor bp.     Problem/Plan - 6:  Problem: Other hyperlipidemia. Plan: c/w statin.    Problem/Plan - 7:  ·  Problem: Prophylactic measure.  Plan: sq heparin.       Attending Statement:  40 minutes spent on total encounter; more than 50% of the visit was spent counseling and/or coordinating care by the attending physician.
pt known to me with hx of CKD and renal transplant presents with a chief complaint of Right leg "mrsa"     HPI:  67F w/ PMH HTN, SCC of skin multiple sites over body, PCKD w s/p renal transplant 2000, presents c/o worsening RLE infection of Rt Leg.  4 days ago treated with doxycycline per her dermatologist,.  pt noted a pustule over her Rt lower leg and opened it on her own.  She's had similar episode in the past that was diagnosed w/ MRSA.  She had an appt w/ her dermatologist 2 days ago and was prescribed doxycycline and bacitracin.  Pt presents  d/t worsening "redness"  and pain, she's unable to walk.  She denies fever, chills.   In ED: doppler of RLE is neg. Given 1 dose vanco.    today    Seen at bedside, right lower extremity erythematous and warm with pustular lesion has improved but more pus forming in blister  Offers no other complaints.      PAST MEDICAL & SURGICAL HISTORY:  MVP (mitral valve prolapse)  Raynaud's disease  Squamous cell carcinoma: multiple extensive lesions of torso-all extremities-face/scalp  Hypertension  Hyperlipidemia  Polycystic kidney disease  Fibroid uterus  H/O lumpectomy: left  History of incisional hernia repair  History of kidney transplant: left  History of Mohs surgery for squamous cell carcinoma of skin: recent left arm procedure-multiple Moh&#x27;s procedure in the past      MEDICATIONS  (STANDING):  amLODIPine   Tablet 5 milliGRAM(s) Oral daily  AQUAPHOR (petrolatum Ointment) 1 Application(s) Topical four times a day  aspirin enteric coated 81 milliGRAM(s) Oral daily  cholecalciferol 2000 Unit(s) Oral daily  heparin  Injectable 5000 Unit(s) SubCutaneous every 12 hours  losartan 100 milliGRAM(s) Oral daily  metoprolol     tartrate 100 milliGRAM(s) Oral two times a day  predniSONE   Tablet 5 milliGRAM(s) Oral daily  simvastatin 20 milliGRAM(s) Oral at bedtime  sirolimus 0.5 milliGRAM(s) Oral daily  sodium chloride 0.9%. 1000 milliLiter(s) (50 mL/Hr) IV Continuous <Continuous>  vancomycin  IVPB 750 milliGRAM(s) IV Intermittent every 24 hours      Allergies    bacitracin (Rash)  Levaquin (Sedation/Somnol; Muscle Pain)  tetracyclines (Other)    Intolerances    SOCIAL HISTORY:  Denies ETOh,Smoking,     FAMILY HISTORY:  No pertinent family history in first degree relatives      REVIEW OF SYSTEMS:    CONSTITUTIONAL: No weakness, fevers or chills  EYES/ENT: No visual changes;  No vertigo or throat pain   NECK: No pain or stiffness  RESPIRATORY: No cough, wheezing, hemoptysis; No shortness of breath  CARDIOVASCULAR: No chest pain or palpitations  GASTROINTESTINAL: No abdominal or epigastric pain. No nausea, vomiting, or hematemesis; No diarrhea or constipation. No melena or hematochezia.  GENITOURINARY: No dysuria, frequency or hematuria  NEUROLOGICAL: No numbness or weakness  SKIN: No itching, burning, rashes, or lesions   All other review of systems is negative unless indicated above.    Vital Signs Last 24 Hrs  T(C): 36.5 (08 Jan 2018 11:48), Max: 36.9 (07 Jan 2018 16:04)  T(F): 97.7 (08 Jan 2018 11:48), Max: 98.5 (07 Jan 2018 21:06)  HR: 68 (08 Jan 2018 05:28) (68 - 87)  BP: 132/67 (08 Jan 2018 11:48) (127/56 - 143/72)  BP(mean): --  RR: 18 (08 Jan 2018 11:48) (16 - 18)  SpO2: 99% (08 Jan 2018 05:28) (97% - 99%)  I&O's Summary    07 Jan 2018 07:01  -  08 Jan 2018 07:00  --------------------------------------------------------  IN: 800 mL / OUT: 0 mL / NET: 800 mL      PHYSICAL EXAM:    Constitutional: NAD  HEENT: PERRLA, EOMI,  MMM  Neck: No LAD, No JVD  Respiratory: CTAB  Cardiovascular: S1 and S2  Gastrointestinal: BS+, soft, NT/ND  Extremities: No peripheral edema  RLE 2 cm blister with with pus - increased in size   Neurological: A/O x 3, no focal deficits  Psychiatric: Normal mood, normal affect  : No Manning  Skin: No rashes  Access: Not applicable    LABS:                                           12.5   6.5   )-----------( 220      ( 08 Jan 2018 05:23 )             39.7     143    |  110    |  25     ----------------------------<  70        08 Jan 2018 05:23  3.8     |  25     |  1.37     144    |  113    |  25     ----------------------------<  81        07 Jan 2018 07:28  4.2     |  24     |  1.34     143    |  112    |  28     ----------------------------<  79        06 Jan 2018 07:01  3.7     |  23     |  1.38     Ca    9.6        08 Jan 2018 05:23  Ca    9.4        07 Jan 2018 07:28    Phos  3.0       08 Jan 2018 05:23      Sirolimus (Rapamycin) Level, Serum: 5.4:  -drawn at 7 am ( dose is given 12 pm )     Culture Results:   No growth to date. (01.05.18 @ 17:08)          RADIOLOGY & ADDITIONAL STUDIES:
pt known to me with hx of CKD and renal transplant presents with a chief complaint of Right leg "mrsa"     HPI:  67F w/ PMH HTN, SCC of skin multiple sites over body, PCKD w s/p renal transplant 2000, presents c/o worsening RLE infection of Rt Leg.  4 days ago treated with doxycycline per her dermatologist,.  pt noted a pustule over her Rt lower leg and opened it on her own.  She's had similar episode in the past that was diagnosed w/ MRSA.  She had an appt w/ her dermatologist 2 days ago and was prescribed doxycycline and bacitracin.  Pt presents  d/t worsening "redness"  and pain, she's unable to walk.  She denies fever, chills.   In ED: doppler of RLE is neg. Given 1 dose vanco.    today    Seen at bedside, right lower extremity erythematous and warm with pustular lesion has improved but more pus forming in blister  Offers no other complaints.      PAST MEDICAL & SURGICAL HISTORY:  MVP (mitral valve prolapse)  Raynaud's disease  Squamous cell carcinoma: multiple extensive lesions of torso-all extremities-face/scalp  Hypertension  Hyperlipidemia  Polycystic kidney disease  Fibroid uterus  H/O lumpectomy: left  History of incisional hernia repair  History of kidney transplant: left  History of Mohs surgery for squamous cell carcinoma of skin: recent left arm procedure-multiple Moh&#x27;s procedure in the past      MEDICATIONS  (STANDING):  amLODIPine   Tablet 5 milliGRAM(s) Oral daily  AQUAPHOR (petrolatum Ointment) 1 Application(s) Topical four times a day  aspirin enteric coated 81 milliGRAM(s) Oral daily  cholecalciferol 2000 Unit(s) Oral daily  heparin  Injectable 5000 Unit(s) SubCutaneous every 12 hours  losartan 100 milliGRAM(s) Oral daily  metoprolol     tartrate 100 milliGRAM(s) Oral two times a day  predniSONE   Tablet 5 milliGRAM(s) Oral daily  simvastatin 20 milliGRAM(s) Oral at bedtime  sirolimus 0.5 milliGRAM(s) Oral daily  sodium chloride 0.9%. 1000 milliLiter(s) (50 mL/Hr) IV Continuous <Continuous>  vancomycin  IVPB 750 milliGRAM(s) IV Intermittent every 24 hours      Allergies    bacitracin (Rash)  Levaquin (Sedation/Somnol; Muscle Pain)  tetracyclines (Other)    Intolerances    SOCIAL HISTORY:  Denies ETOh,Smoking,     FAMILY HISTORY:  No pertinent family history in first degree relatives      REVIEW OF SYSTEMS:    CONSTITUTIONAL: No weakness, fevers or chills  EYES/ENT: No visual changes;  No vertigo or throat pain   NECK: No pain or stiffness  RESPIRATORY: No cough, wheezing, hemoptysis; No shortness of breath  CARDIOVASCULAR: No chest pain or palpitations  GASTROINTESTINAL: No abdominal or epigastric pain. No nausea, vomiting, or hematemesis; No diarrhea or constipation. No melena or hematochezia.  GENITOURINARY: No dysuria, frequency or hematuria  NEUROLOGICAL: No numbness or weakness  SKIN: No itching, burning, rashes, or lesions   All other review of systems is negative unless indicated above.    Vital Signs Last 24 Hrs  T(C): 36.9 (07 Jan 2018 16:04), Max: 36.9 (07 Jan 2018 16:04)  T(F): 98.4 (07 Jan 2018 16:04), Max: 98.4 (07 Jan 2018 16:04)  HR: 85 (07 Jan 2018 16:04) (83 - 87)  BP: 140/58 (07 Jan 2018 16:04) (131/77 - 140/58)  BP(mean): --  RR: 16 (07 Jan 2018 16:04) (16 - 18)  SpO2: 97% (07 Jan 2018 16:04) (97% - 100%)    I&O's Summary    06 Jan 2018 07:01  -  07 Jan 2018 07:00  --------------------------------------------------------  IN: 700 mL / OUT: 0 mL / NET: 700 mL            PHYSICAL EXAM:    Constitutional: NAD  HEENT: PERRLA, EOMI,  MMM  Neck: No LAD, No JVD  Respiratory: CTAB  Cardiovascular: S1 and S2  Gastrointestinal: BS+, soft, NT/ND  Extremities: No peripheral edema  RLE 2 cm blister with with pus  Neurological: A/O x 3, no focal deficits  Psychiatric: Normal mood, normal affect  : No Manning  Skin: No rashes  Access: Not applicable    LABS:                                        12.1   5.8   )-----------( 176      ( 06 Jan 2018 07:01 )             37.8     144    |  113    |  25     ----------------------------<  81        07 Jan 2018 07:28  4.2     |  24     |  1.34     143    |  112    |  28     ----------------------------<  79        06 Jan 2018 07:01  3.7     |  23     |  1.38     139    |  106    |  36     ----------------------------<  151       05 Jan 2018 17:00  4.3     |  23     |  1.88     Ca    9.4        07 Jan 2018 07:28  Ca    9.2        06 Jan 2018 07:01      Urine Studies:    RADIOLOGY & ADDITIONAL STUDIES:

## 2018-01-09 ENCOUNTER — TRANSCRIPTION ENCOUNTER (OUTPATIENT)
Age: 68
End: 2018-01-09

## 2018-01-09 VITALS
TEMPERATURE: 98 F | HEART RATE: 68 BPM | DIASTOLIC BLOOD PRESSURE: 63 MMHG | OXYGEN SATURATION: 100 % | SYSTOLIC BLOOD PRESSURE: 136 MMHG | RESPIRATION RATE: 18 BRPM

## 2018-01-09 LAB
ANION GAP SERPL CALC-SCNC: 9 MMOL/L — SIGNIFICANT CHANGE UP (ref 5–17)
BUN SERPL-MCNC: 32 MG/DL — HIGH (ref 7–23)
CALCIUM SERPL-MCNC: 9.4 MG/DL — SIGNIFICANT CHANGE UP (ref 8.5–10.1)
CHLORIDE SERPL-SCNC: 110 MMOL/L — HIGH (ref 96–108)
CO2 SERPL-SCNC: 24 MMOL/L — SIGNIFICANT CHANGE UP (ref 22–31)
CREAT SERPL-MCNC: 1.35 MG/DL — HIGH (ref 0.5–1.3)
GLUCOSE SERPL-MCNC: 80 MG/DL — SIGNIFICANT CHANGE UP (ref 70–99)
POTASSIUM SERPL-MCNC: 3.9 MMOL/L — SIGNIFICANT CHANGE UP (ref 3.5–5.3)
POTASSIUM SERPL-SCNC: 3.9 MMOL/L — SIGNIFICANT CHANGE UP (ref 3.5–5.3)
SODIUM SERPL-SCNC: 143 MMOL/L — SIGNIFICANT CHANGE UP (ref 135–145)
VANCOMYCIN FLD-MCNC: 16.6 UG/ML — SIGNIFICANT CHANGE UP (ref 10–20)
VANCOMYCIN TROUGH SERPL-MCNC: 16.6 UG/ML — SIGNIFICANT CHANGE UP (ref 10–20)

## 2018-01-09 RX ORDER — METOPROLOL TARTRATE 50 MG
1 TABLET ORAL
Qty: 0 | Refills: 0 | COMMUNITY
Start: 2018-01-09

## 2018-01-09 RX ORDER — CHOLECALCIFEROL (VITAMIN D3) 125 MCG
2000 CAPSULE ORAL
Qty: 0 | Refills: 0 | COMMUNITY
Start: 2018-01-09

## 2018-01-09 RX ORDER — LACTOBACILLUS ACIDOPHILUS 100MM CELL
1 CAPSULE ORAL
Qty: 0 | Refills: 0 | COMMUNITY
Start: 2018-01-09

## 2018-01-09 RX ADMIN — Medication 5 MILLIGRAM(S): at 05:53

## 2018-01-09 RX ADMIN — Medication 100 MILLIGRAM(S): at 05:53

## 2018-01-09 RX ADMIN — Medication 1 TABLET(S): at 12:12

## 2018-01-09 RX ADMIN — Medication 150 MILLIGRAM(S): at 05:54

## 2018-01-09 RX ADMIN — Medication 81 MILLIGRAM(S): at 12:12

## 2018-01-09 RX ADMIN — Medication 2000 UNIT(S): at 12:12

## 2018-01-09 RX ADMIN — LOSARTAN POTASSIUM 100 MILLIGRAM(S): 100 TABLET, FILM COATED ORAL at 05:53

## 2018-01-09 RX ADMIN — Medication 1 APPLICATION(S): at 05:54

## 2018-01-09 RX ADMIN — Medication 1 APPLICATION(S): at 12:10

## 2018-01-09 RX ADMIN — AMLODIPINE BESYLATE 5 MILLIGRAM(S): 2.5 TABLET ORAL at 05:53

## 2018-01-09 RX ADMIN — SIROLIMUS 0.5 MILLIGRAM(S): 1 SOLUTION ORAL at 12:13

## 2018-01-09 NOTE — DISCHARGE NOTE ADULT - MEDICATION SUMMARY - MEDICATIONS TO TAKE
I will START or STAY ON the medications listed below when I get home from the hospital:    predniSONE 5 mg oral tablet  -- 1 tab(s) by mouth once a day  -- Indication: For Renal transplant recipient    aspirin 81 mg oral tablet  -- 1 tab(s) by mouth once a day  -- Indication: For Prophylactic measure    losartan 100 mg oral tablet  -- 1 tab(s) by mouth once a day  -- Indication: For Essential hypertension    simvastatin 20 mg oral tablet  -- 1 tab(s) by mouth once a day (at bedtime)  -- Indication: For Other hyperlipidemia    metoprolol tartrate 100 mg oral tablet  -- 1 tab(s) by mouth 2 times a day  -- Indication: For Essential hypertension    Norvasc 5 mg oral tablet  -- 1 tab(s) by mouth once a day  -- Indication: For Essential hypertension    sirolimus 0.5 mg oral tablet  -- 1 tab(s) by mouth once a day  -- Indication: For Renal transplant recipient    Acidophilus oral capsule  -- 1 tab(s) by mouth 2 times a day  -- Indication: For Prophylactic measure    Estrace 1 mg oral tablet  -- orally once a week  -- Indication: For Resume home meds    cholecalciferol oral tablet  -- 2000 unit(s) by mouth once a day  -- Indication: For vitamin D

## 2018-01-09 NOTE — DISCHARGE NOTE ADULT - CARE PLAN
Principal Discharge DX:	Cellulitis of right lower extremity  Goal:	antibiotic therapy.  Instructions for follow-up, activity and diet:	continue oral doxycycline for 8 more days then stop.  Follow up with your PMD and or dermatologist.  Secondary Diagnosis:	Renal transplant recipient  Goal:	Continue anti-rejection meds.  Instructions for follow-up, activity and diet:	Follow up with your nephrologist for ongoing care.

## 2018-01-09 NOTE — DISCHARGE NOTE ADULT - PLAN OF CARE
antibiotic therapy. continue oral doxycycline for 8 more days then stop.  Follow up with your PMD and or dermatologist. Continue anti-rejection meds. Follow up with your nephrologist for ongoing care.

## 2018-01-09 NOTE — DISCHARGE NOTE ADULT - PATIENT PORTAL LINK FT
“You can access the [facility] Patient Portal, offered by [facility], by registering with the following website: [email or web address]”

## 2018-01-09 NOTE — DISCHARGE NOTE ADULT - HOSPITAL COURSE
· Subjective and Objective: 	  Patient is a 67y old  Female who presents with a chief complaint of Right leg "mrsa" (05 Jan 2018 21:34)      HPI:  67F w/ PMH HTN, SCC of skin multiple over body, PCKD w/p renal transplant 2000, presents c/o worsening "mrsa" infection of Rt Leg.  4 days ago, pt noted a pustule over her Rt lower leg and opened it on her own.  She's had similar episode in the past that was diagnosed w/ MRSA.  She had an appt w/ her dermatologist 2 days ago and was prescribed doxycycline and bacitracin.  Pt presents today d/t worsening "redness"  and pain, she's unable to walk.  She denies fever, chills.   In ED: doppler of RLE is neg. Given 1 dose vanco.    1/6: Seen at bedside, right lower extremity erythematous and warm with pustular lesion. Offers no other complaints.  1/7: Lower extremity is less tender, papula with surrounding erythema still present.  Tolerating abx.  1/8: Lower extremity improving. Less tender.    1/9: HD stable, asymptomatic, no complaints.  Eager to go home. Lower extremity erythema improved.  Pt received IV vanco since admission, with therapeutic dose.  Doing well.  Will d/c on oral doxy per ID to finish out 10 day course.  Renal function has remained stable.    REVIEW OF SYSTEMS: All other review of systems is negative unless indicated above.    Vital Signs Last 24 Hrs  T(C): 36.4 (09 Jan 2018 11:29), Max: 36.9 (09 Jan 2018 05:29)  T(F): 97.6 (09 Jan 2018 11:29), Max: 98.4 (09 Jan 2018 05:29)  HR: 68 (09 Jan 2018 11:29) (64 - 71)  BP: 136/63 (09 Jan 2018 11:29) (136/63 - 146/57)  RR: 18 (09 Jan 2018 11:29) (18 - 18)  SpO2: 100% (09 Jan 2018 11:29) (95% - 100%)    PHYSICAL EXAM  Constitutional: NAD, awake and alert, well-developed  HEENT: PERR, EOMI, Normal Hearing, MMM  Neck: Soft and supple  Respiratory: Breath sounds are clear bilaterally, No wheezing, rales or rhonchi  Cardiovascular: S1 and S2, regular rate and rhythm, no Murmurs, gallops or rubs  Gastrointestinal: Bowel Sounds present, soft, nontender, nondistended, no guarding, no rebound  Extremities: right LE w/ pustule with surround erythema, and warmth, non-tender.  Neurological: A/O x 3, no focal deficits  Skin: No rashes    meds/labs: reviewed.    Assessment and Plan:  67F w/ PMH HTN, SCC of skin multiple over body, PCKD w/p renal transplant 2000, presents c/o worsening RLE cellulitis.  h/o MRSA    Cellulitis of right lower extremity.    -LA 2.2-->1.3 after 2 L NS bolus  -s/p multiple days of IV vanco to cover for MRSA. Will discharge today on oral doxy to finish out 10 day course.  -blood cultures NTD.  -ID consult appreciated   -doppler of RLE is neg.     Renal transplant recipient: c/w sirolimus, prednisone.  Levels inaccurate.  f/u repeat.    Squamous cell carcinoma: outpt f/u w/ derm.    Essential hypertension: bp controlled, c/w losartan, norvasc, metoprolol home doses.     hyperlipidemia: c/w statin.    Attending Statement: 40 minutes spent on total encounter; more than 50% of the visit was spent counseling and/or coordinating care by the attending physician.

## 2018-01-10 LAB
CULTURE RESULTS: SIGNIFICANT CHANGE UP
CULTURE RESULTS: SIGNIFICANT CHANGE UP
SPECIMEN SOURCE: SIGNIFICANT CHANGE UP
SPECIMEN SOURCE: SIGNIFICANT CHANGE UP

## 2018-01-16 ENCOUNTER — INPATIENT (INPATIENT)
Facility: HOSPITAL | Age: 68
LOS: 4 days | Discharge: HOME | End: 2018-01-21
Attending: INTERNAL MEDICINE | Admitting: INTERNAL MEDICINE
Payer: MEDICARE

## 2018-01-16 VITALS — HEIGHT: 65 IN | WEIGHT: 110.01 LBS

## 2018-01-16 DIAGNOSIS — M79.661 PAIN IN RIGHT LOWER LEG: ICD-10-CM

## 2018-01-16 DIAGNOSIS — I73.00 RAYNAUD'S SYNDROME WITHOUT GANGRENE: ICD-10-CM

## 2018-01-16 DIAGNOSIS — Z79.2 LONG TERM (CURRENT) USE OF ANTIBIOTICS: ICD-10-CM

## 2018-01-16 DIAGNOSIS — L02.415 CUTANEOUS ABSCESS OF RIGHT LOWER LIMB: ICD-10-CM

## 2018-01-16 DIAGNOSIS — D25.9 LEIOMYOMA OF UTERUS, UNSPECIFIED: Chronic | ICD-10-CM

## 2018-01-16 DIAGNOSIS — L03.115 CELLULITIS OF RIGHT LOWER LIMB: ICD-10-CM

## 2018-01-16 DIAGNOSIS — Q61.3 POLYCYSTIC KIDNEY, UNSPECIFIED: ICD-10-CM

## 2018-01-16 DIAGNOSIS — Z88.1 ALLERGY STATUS TO OTHER ANTIBIOTIC AGENTS STATUS: ICD-10-CM

## 2018-01-16 DIAGNOSIS — Z86.14 PERSONAL HISTORY OF METHICILLIN RESISTANT STAPHYLOCOCCUS AUREUS INFECTION: ICD-10-CM

## 2018-01-16 DIAGNOSIS — Z98.89 OTHER SPECIFIED POSTPROCEDURAL STATES: Chronic | ICD-10-CM

## 2018-01-16 DIAGNOSIS — Z94.0 KIDNEY TRANSPLANT STATUS: Chronic | ICD-10-CM

## 2018-01-16 DIAGNOSIS — C44.82 SQUAMOUS CELL CARCINOMA OF OVERLAPPING SITES OF SKIN: ICD-10-CM

## 2018-01-16 DIAGNOSIS — Z99.2 DEPENDENCE ON RENAL DIALYSIS: ICD-10-CM

## 2018-01-16 DIAGNOSIS — Z79.52 LONG TERM (CURRENT) USE OF SYSTEMIC STEROIDS: ICD-10-CM

## 2018-01-16 DIAGNOSIS — Z94.0 KIDNEY TRANSPLANT STATUS: ICD-10-CM

## 2018-01-16 DIAGNOSIS — E78.5 HYPERLIPIDEMIA, UNSPECIFIED: ICD-10-CM

## 2018-01-16 DIAGNOSIS — N18.3 CHRONIC KIDNEY DISEASE, STAGE 3 (MODERATE): ICD-10-CM

## 2018-01-16 DIAGNOSIS — Z79.899 OTHER LONG TERM (CURRENT) DRUG THERAPY: ICD-10-CM

## 2018-01-16 DIAGNOSIS — I34.1 NONRHEUMATIC MITRAL (VALVE) PROLAPSE: ICD-10-CM

## 2018-01-16 DIAGNOSIS — D25.9 LEIOMYOMA OF UTERUS, UNSPECIFIED: ICD-10-CM

## 2018-01-16 DIAGNOSIS — B95.62 METHICILLIN RESISTANT STAPHYLOCOCCUS AUREUS INFECTION AS THE CAUSE OF DISEASES CLASSIFIED ELSEWHERE: ICD-10-CM

## 2018-01-16 DIAGNOSIS — Z85.828 PERSONAL HISTORY OF OTHER MALIGNANT NEOPLASM OF SKIN: Chronic | ICD-10-CM

## 2018-01-16 DIAGNOSIS — I12.9 HYPERTENSIVE CHRONIC KIDNEY DISEASE WITH STAGE 1 THROUGH STAGE 4 CHRONIC KIDNEY DISEASE, OR UNSPECIFIED CHRONIC KIDNEY DISEASE: ICD-10-CM

## 2018-01-16 LAB
ALBUMIN SERPL ELPH-MCNC: 3.1 G/DL — LOW (ref 3.3–5)
ALP SERPL-CCNC: 67 U/L — SIGNIFICANT CHANGE UP (ref 40–120)
ALT FLD-CCNC: 27 U/L — SIGNIFICANT CHANGE UP (ref 12–78)
ANION GAP SERPL CALC-SCNC: 11 MMOL/L — SIGNIFICANT CHANGE UP (ref 5–17)
AST SERPL-CCNC: 28 U/L — SIGNIFICANT CHANGE UP (ref 15–37)
BASOPHILS # BLD AUTO: 0.1 K/UL — SIGNIFICANT CHANGE UP (ref 0–0.2)
BASOPHILS NFR BLD AUTO: 1 % — SIGNIFICANT CHANGE UP (ref 0–2)
BILIRUB SERPL-MCNC: 0.5 MG/DL — SIGNIFICANT CHANGE UP (ref 0.2–1.2)
BUN SERPL-MCNC: 43 MG/DL — HIGH (ref 7–23)
CALCIUM SERPL-MCNC: 9.7 MG/DL — SIGNIFICANT CHANGE UP (ref 8.5–10.1)
CHLORIDE SERPL-SCNC: 108 MMOL/L — SIGNIFICANT CHANGE UP (ref 96–108)
CO2 SERPL-SCNC: 17 MMOL/L — LOW (ref 22–31)
CREAT SERPL-MCNC: 2.21 MG/DL — HIGH (ref 0.5–1.3)
EOSINOPHIL # BLD AUTO: 0.1 K/UL — SIGNIFICANT CHANGE UP (ref 0–0.5)
EOSINOPHIL NFR BLD AUTO: 0.6 % — SIGNIFICANT CHANGE UP (ref 0–6)
GLUCOSE SERPL-MCNC: 80 MG/DL — SIGNIFICANT CHANGE UP (ref 70–99)
HCT VFR BLD CALC: 45.6 % — HIGH (ref 34.5–45)
HGB BLD-MCNC: 14.5 G/DL — SIGNIFICANT CHANGE UP (ref 11.5–15.5)
LYMPHOCYTES # BLD AUTO: 1.3 K/UL — SIGNIFICANT CHANGE UP (ref 1–3.3)
LYMPHOCYTES # BLD AUTO: 13.6 % — SIGNIFICANT CHANGE UP (ref 13–44)
MCHC RBC-ENTMCNC: 28.6 PG — SIGNIFICANT CHANGE UP (ref 27–34)
MCHC RBC-ENTMCNC: 31.8 GM/DL — LOW (ref 32–36)
MCV RBC AUTO: 89.8 FL — SIGNIFICANT CHANGE UP (ref 80–100)
MONOCYTES # BLD AUTO: 0.8 K/UL — SIGNIFICANT CHANGE UP (ref 0–0.9)
MONOCYTES NFR BLD AUTO: 8.7 % — SIGNIFICANT CHANGE UP (ref 2–14)
NEUTROPHILS # BLD AUTO: 7 K/UL — SIGNIFICANT CHANGE UP (ref 1.8–7.4)
NEUTROPHILS NFR BLD AUTO: 76 % — SIGNIFICANT CHANGE UP (ref 43–77)
PLATELET # BLD AUTO: 303 K/UL — SIGNIFICANT CHANGE UP (ref 150–400)
POTASSIUM SERPL-MCNC: 3.6 MMOL/L — SIGNIFICANT CHANGE UP (ref 3.5–5.3)
POTASSIUM SERPL-SCNC: 3.6 MMOL/L — SIGNIFICANT CHANGE UP (ref 3.5–5.3)
PROT SERPL-MCNC: 7.4 GM/DL — SIGNIFICANT CHANGE UP (ref 6–8.3)
RBC # BLD: 5.08 M/UL — SIGNIFICANT CHANGE UP (ref 3.8–5.2)
RBC # FLD: 14.1 % — SIGNIFICANT CHANGE UP (ref 10.3–14.5)
SODIUM SERPL-SCNC: 136 MMOL/L — SIGNIFICANT CHANGE UP (ref 135–145)
WBC # BLD: 9.2 K/UL — SIGNIFICANT CHANGE UP (ref 3.8–10.5)
WBC # FLD AUTO: 9.2 K/UL — SIGNIFICANT CHANGE UP (ref 3.8–10.5)

## 2018-01-16 PROCEDURE — 93010 ELECTROCARDIOGRAM REPORT: CPT

## 2018-01-16 PROCEDURE — 99285 EMERGENCY DEPT VISIT HI MDM: CPT

## 2018-01-16 PROCEDURE — 74019 RADEX ABDOMEN 2 VIEWS: CPT | Mod: 26

## 2018-01-16 RX ORDER — ACETAMINOPHEN 500 MG
1000 TABLET ORAL ONCE
Qty: 0 | Refills: 0 | Status: COMPLETED | OUTPATIENT
Start: 2018-01-16 | End: 2018-01-16

## 2018-01-16 RX ORDER — ASPIRIN/CALCIUM CARB/MAGNESIUM 324 MG
81 TABLET ORAL DAILY
Qty: 0 | Refills: 0 | Status: DISCONTINUED | OUTPATIENT
Start: 2018-01-16 | End: 2018-01-21

## 2018-01-16 RX ORDER — CHOLECALCIFEROL (VITAMIN D3) 125 MCG
2000 CAPSULE ORAL DAILY
Qty: 0 | Refills: 0 | Status: DISCONTINUED | OUTPATIENT
Start: 2018-01-16 | End: 2018-01-21

## 2018-01-16 RX ORDER — SODIUM CHLORIDE 9 MG/ML
1000 INJECTION INTRAMUSCULAR; INTRAVENOUS; SUBCUTANEOUS
Qty: 0 | Refills: 0 | Status: DISCONTINUED | OUTPATIENT
Start: 2018-01-16 | End: 2018-01-16

## 2018-01-16 RX ORDER — ACETAMINOPHEN 500 MG
650 TABLET ORAL EVERY 6 HOURS
Qty: 0 | Refills: 0 | Status: DISCONTINUED | OUTPATIENT
Start: 2018-01-16 | End: 2018-01-21

## 2018-01-16 RX ORDER — SODIUM CHLORIDE 9 MG/ML
1000 INJECTION, SOLUTION INTRAVENOUS
Qty: 0 | Refills: 0 | Status: COMPLETED | OUTPATIENT
Start: 2018-01-16 | End: 2018-01-17

## 2018-01-16 RX ORDER — LACTOBACILLUS ACIDOPHILUS 100MM CELL
1 CAPSULE ORAL
Qty: 0 | Refills: 0 | Status: DISCONTINUED | OUTPATIENT
Start: 2018-01-16 | End: 2018-01-21

## 2018-01-16 RX ORDER — METOPROLOL TARTRATE 50 MG
100 TABLET ORAL
Qty: 0 | Refills: 0 | Status: DISCONTINUED | OUTPATIENT
Start: 2018-01-16 | End: 2018-01-17

## 2018-01-16 RX ORDER — METRONIDAZOLE 500 MG
500 TABLET ORAL EVERY 8 HOURS
Qty: 0 | Refills: 0 | Status: DISCONTINUED | OUTPATIENT
Start: 2018-01-16 | End: 2018-01-21

## 2018-01-16 RX ORDER — SIROLIMUS 1 MG/ML
0.5 SOLUTION ORAL DAILY
Qty: 0 | Refills: 0 | Status: DISCONTINUED | OUTPATIENT
Start: 2018-01-16 | End: 2018-01-20

## 2018-01-16 RX ORDER — AMLODIPINE BESYLATE 2.5 MG/1
5 TABLET ORAL DAILY
Qty: 0 | Refills: 0 | Status: DISCONTINUED | OUTPATIENT
Start: 2018-01-16 | End: 2018-01-18

## 2018-01-16 RX ORDER — ONDANSETRON 8 MG/1
4 TABLET, FILM COATED ORAL EVERY 6 HOURS
Qty: 0 | Refills: 0 | Status: DISCONTINUED | OUTPATIENT
Start: 2018-01-16 | End: 2018-01-21

## 2018-01-16 RX ORDER — SIMVASTATIN 20 MG/1
20 TABLET, FILM COATED ORAL AT BEDTIME
Qty: 0 | Refills: 0 | Status: DISCONTINUED | OUTPATIENT
Start: 2018-01-16 | End: 2018-01-21

## 2018-01-16 RX ORDER — POTASSIUM CHLORIDE 20 MEQ
20 PACKET (EA) ORAL ONCE
Qty: 0 | Refills: 0 | Status: COMPLETED | OUTPATIENT
Start: 2018-01-16 | End: 2018-01-16

## 2018-01-16 RX ORDER — METRONIDAZOLE 500 MG
500 TABLET ORAL ONCE
Qty: 0 | Refills: 0 | Status: COMPLETED | OUTPATIENT
Start: 2018-01-16 | End: 2018-01-16

## 2018-01-16 RX ORDER — VANCOMYCIN HCL 1 G
125 VIAL (EA) INTRAVENOUS EVERY 6 HOURS
Qty: 0 | Refills: 0 | Status: DISCONTINUED | OUTPATIENT
Start: 2018-01-16 | End: 2018-01-21

## 2018-01-16 RX ORDER — SODIUM CHLORIDE 9 MG/ML
1000 INJECTION INTRAMUSCULAR; INTRAVENOUS; SUBCUTANEOUS ONCE
Qty: 0 | Refills: 0 | Status: COMPLETED | OUTPATIENT
Start: 2018-01-16 | End: 2018-01-16

## 2018-01-16 RX ADMIN — Medication 1 TABLET(S): at 18:13

## 2018-01-16 RX ADMIN — SODIUM CHLORIDE 2000 MILLILITER(S): 9 INJECTION INTRAMUSCULAR; INTRAVENOUS; SUBCUTANEOUS at 11:37

## 2018-01-16 RX ADMIN — Medication 125 MILLIGRAM(S): at 23:08

## 2018-01-16 RX ADMIN — SODIUM CHLORIDE 100 MILLILITER(S): 9 INJECTION, SOLUTION INTRAVENOUS at 17:05

## 2018-01-16 RX ADMIN — Medication 125 MILLIGRAM(S): at 15:19

## 2018-01-16 RX ADMIN — Medication 100 MILLIGRAM(S): at 22:22

## 2018-01-16 RX ADMIN — Medication 100 MILLIGRAM(S): at 18:13

## 2018-01-16 RX ADMIN — Medication 100 MILLIGRAM(S): at 11:37

## 2018-01-16 RX ADMIN — SIMVASTATIN 20 MILLIGRAM(S): 20 TABLET, FILM COATED ORAL at 22:22

## 2018-01-16 RX ADMIN — Medication 20 MILLIEQUIVALENT(S): at 16:19

## 2018-01-16 NOTE — H&P ADULT - HISTORY OF PRESENT ILLNESS
67F w/ PMH HTN, SCC of skin multiple over body, PCKD w/p renal transplant 2000, recent MRSA cellulitis RT leg discharged on PO Doxycycline presented with diarrhea >10times a day. Patient was on doxy when 5 days ago started having diarrhea, outpatient specimen was positive for cdiff. She states she was trying to drink enough PO fluids, but was unable to keep up with intake.

## 2018-01-16 NOTE — ED PROVIDER NOTE - CARE PLAN
Principal Discharge DX:	Diarrhea Principal Discharge DX:	Diarrhea  Secondary Diagnosis:	FREDDIE (acute kidney injury)

## 2018-01-16 NOTE — CONSULT NOTE ADULT - ASSESSMENT
68 yo female with PMHX of HTN, SCC, renal transplant in 2000, PCKD, CKD 3 w Cr ~ 1.6 - 1.7 , w recent diagnosis of SCC mets to LN of neck required local resection and completed recent radiation , MRSA leg now with Cdiff.        CKD III ~ 1.7, FREDDIE   - IVF to keep net +  - 1/2 NS with bicarbonate in the setting of acidosis  -daily labs/lytes  -Renally dose meds    Renal transplant   - continue sirolmus 0.5 mg daily and pred 5mg daily    - sirolimus goal ~ 3 per [facility] due to recurrent skin cancers    Cdiff  -Abx per ID/medicine  -repeat of stools pending  -Optimize oral intake as able  -isolation    d/c with   d/c with Dr. Abdullahi

## 2018-01-16 NOTE — ED PROVIDER NOTE - PROGRESS NOTE DETAILS
Adrian PGY3: raymond, will admit for IV antibiotics and hydration Attending Phoebe: PE: AAOx3 NAD, Cardiac S1S2 no mrg, Resp CTAB, Abd soft NTND, Neuro no focal deficits  Recent admission for MRSA cellulitis, now p/w copious diarrhea concerning for cdiff. Also FREDDIE on labs likely 2/2 dehydration. Agree with plan for admission

## 2018-01-16 NOTE — CONSULT NOTE ADULT - SUBJECTIVE AND OBJECTIVE BOX
Patient is a 67y Female whom presented to the hospital with HTN, SCC of skin multiple sites over body, PCKD w s/p renal transplant 2000, recent MRSA of leg home on doxy and oral vanco, here with diarreha. Patient called yesterday with complaint of diarrhea and poor intake, was advised to come in but she refused at that time citing wait times in the ER. Noted outpatient + cdiff and doxy was stopped via phone yesterday after d/c with Dr. Howe and oral vanco reorderd x 14 days total. She presents this AM with persistent diarrhea and unable to take po. Currently in ED NS infusing, feeling a little better as she has not taken Po.     PAST MEDICAL & SURGICAL HISTORY:  MVP (mitral valve prolapse)  Raynaud's disease  Squamous cell carcinoma: multiple extensive lesions of torso-all extremities-face/scalp  Hypertension  Hyperlipidemia  Polycystic kidney disease  Fibroid uterus  H/O lumpectomy: left  History of incisional hernia repair  History of kidney transplant: left  History of Mohs surgery for squamous cell carcinoma of skin: recent left arm procedure-multiple Moh&#x27;s procedure in the past      MEDICATIONS  (STANDING):  amLODIPine   Tablet 5 milliGRAM(s) Oral daily  aspirin  chewable 81 milliGRAM(s) Oral daily  cholecalciferol 2000 Unit(s) Oral daily  lactobacillus acidophilus 1 Tablet(s) Oral two times a day with meals  metoprolol     tartrate 100 milliGRAM(s) Oral two times a day  metroNIDAZOLE  IVPB 500 milliGRAM(s) IV Intermittent every 8 hours  predniSONE   Tablet 5 milliGRAM(s) Oral daily  simvastatin 20 milliGRAM(s) Oral at bedtime  sirolimus 0.5 milliGRAM(s) Oral daily  sodium chloride 0.9%. 1000 milliLiter(s) (100 mL/Hr) IV Continuous <Continuous>  vancomycin    Solution 125 milliGRAM(s) Oral every 6 hours    MEDICATIONS  (PRN):  acetaminophen   Tablet 650 milliGRAM(s) Oral every 6 hours PRN For Temp greater than 38 C (100.4 F)  acetaminophen   Tablet. 650 milliGRAM(s) Oral every 6 hours PRN Mild Pain (1 - 3)  ondansetron Injectable 4 milliGRAM(s) IV Push every 6 hours PRN Nausea and/or Vomiting      Allergies    bacitracin (Rash)  Levaquin (Sedation/Somnol; Muscle Pain)  tetracyclines (Other)    Intolerances        SOCIAL HISTORY:  no cigg/etoh    FAMILY HISTORY:  No pertinent family history in first degree relatives      REVIEW OF SYSTEMS:    CONSTITUTIONAL: + weakness, fevers or chills  EYES/ENT: No visual changes;  No vertigo or throat pain   NECK: No pain or stiffness  RESPIRATORY: No cough, wheezing, hemoptysis; No shortness of breath  CARDIOVASCULAR: No chest pain or palpitations  GASTROINTESTINAL: none, diarrhea.   GENITOURINARY: No dysuria, frequency or hematuria  NEUROLOGICAL: No numbness or weakness  SKIN: No itching, burning, rashes, or lesions   All other review of systems is negative unless indicated above.      T(C): , Max: 36.4 (01-16-18 @ 13:56)  T(F): , Max: 97.5 (01-16-18 @ 13:56)  HR: 80 (01-16-18 @ 13:56)  BP: 126/52 (01-16-18 @ 13:56)  BP(mean): --  RR: 15 (01-16-18 @ 10:17)  SpO2: 98% (01-16-18 @ 13:56)  Wt(kg): --    Height (cm): 165.1 (01-16 @ 09:53)  Weight (kg): 49.9 (01-16 @ 09:53)  BMI (kg/m2): 18.3 (01-16 @ 09:53)  BSA (m2): 1.53 (01-16 @ 09:53)    PHYSICAL EXAM:    Constitutional: frail, dry MMM  Neck: No LAD, No JVD  Respiratory: CTAB  Cardiovascular: S1 and S2, RRR  Gastrointestinal: soft, NT  Extremities: No peripheral edema  Neurological: A/O x 3, no focal deficits  Psychiatric: Normal mood, normal affect  : No Manning  Skin: No rashes  Access: Not applicable        LABS:                        14.5   9.2   )-----------( 303      ( 16 Jan 2018 10:26 )             45.6     16 Jan 2018 10:26    136    |  108    |  43     ----------------------------<  80     3.6     |  17     |  2.21     Ca    9.7        16 Jan 2018 10:26    TPro  7.4    /  Alb  3.1    /  TBili  0.5    /  DBili  x      /  AST  28     /  ALT  27     /  AlkPhos  67     16 Jan 2018 10:26          Urine Studies:          RADIOLOGY & ADDITIONAL STUDIES:

## 2018-01-16 NOTE — ED ADULT NURSE REASSESSMENT NOTE - NS ED NURSE REASSESS COMMENT FT1
No BM at this time. C-Diff stool sample pending collection. VSS. Admitted, pending bed assignment. Contact prec maintained. Will continue monitoring patient.
Pt  remains in Bed 22 in ED main . Call bell is with in reach . Iso pre maintain. No c/o pain . Pt is aware of need for stool sample .  No s/s of SOB  will monitor . Report called to 5 east will monitor till transport . Safety maintained.

## 2018-01-16 NOTE — ED PROVIDER NOTE - OBJECTIVE STATEMENT
67 year old, 17 years s/p renal transplant and on Sirolimus, prednisone 5mg, presenting with 5 days and test outpatient positive for cdiff. s/p vancomycin oral regimen. Was recently admitted for MRSA in right lower right leg and was hospitalized last week. presenting with more than 10x episodes of watery diarrhea per day. mild discomfort in abdomen. Patient was on doxycline for her leg last week.     PMD: Shannan Joaquin

## 2018-01-16 NOTE — H&P ADULT - NSHPLABSRESULTS_GEN_ALL_CORE
14.5   9.2   )-----------( 303      ( 16 Jan 2018 10:26 )             45.6     136    |  108    |  43     ----------------------------<  80     3.6     |  17     |  2.21     Ca    9.7        16 Jan 2018 10:26  TPro  7.4    /  Alb  3.1    /  TBili  0.5    /  DBili  x      /  AST  28     /  ALT  27     /  AlkPhos  67     16 Jan 2018 10:26  LIVER FUNCTIONS - ( 16 Jan 2018 10:26 )  Alb: 3.1 g/dL / Pro: 7.4 gm/dL / ALK PHOS: 67 U/L / ALT: 27 U/L / AST: 28 U/L / GGT: x

## 2018-01-16 NOTE — H&P ADULT - ASSESSMENT
#Cdiff colitis  Admit to med-surg  ID eval DR Howe  Cont PO Vanco  Add IV Flagyl  Another specimen sent for cdiff, but outpatient result was positive    #ARF on CKD stage 3/h/o renal transplant  Renal consult DR Barros/DR Aguilar  IVF, try to keep net positive  Monitor BMP  Cont transplant meds  Hold Cozaar    #H/o MRSA of the skin- completed doxy    #HTN- now on the lower side  Monitor    #Dispo- inpatient admit

## 2018-01-16 NOTE — ED PROVIDER NOTE - MEDICAL DECISION MAKING DETAILS
Adrian PGY3: outpatient C-Diff positive in immunocompromised renal transplant patient with recent admission for mrsa cellulitis in right leg. appears dry with endorsed ~15lbs weight loss in 2 weeks, will hydrate, start flagyl (took her vanc this morning), basic labs, abdomen xray, will be admitted for failed outpatient

## 2018-01-16 NOTE — ED PROVIDER NOTE - ATTENDING CONTRIBUTION TO CARE
I, Sanjeev Sandhu MD, personally saw the patient with resident.  I have personally performed a face to face diagnostic evaluation on this patient.  I have reviewed the resident note and agree with the history, exam, and plan of care, except as noted.

## 2018-01-16 NOTE — PATIENT PROFILE ADULT. - VISION (WITH CORRECTIVE LENSES IF THE PATIENT USUALLY WEARS THEM):
for distance/reading/Partially impaired: cannot see medication labels or newsprint, but can see obstacles in path, and the surrounding layout; can count fingers at arm's length

## 2018-01-16 NOTE — ED ADULT NURSE NOTE - OBJECTIVE STATEMENT
Diarrhea x 5 days, Diagnosed with C-Diff, recently D/C'ed from  for MRSA od right calf. Patient currently on Vanco.

## 2018-01-16 NOTE — H&P ADULT - NSHPPHYSICALEXAM_GEN_ALL_CORE
Vital Signs Last 24 Hrs  T(C): 36.2 (16 Jan 2018 10:17), Max: 36.2 (16 Jan 2018 10:17)  T(F): 97.1 (16 Jan 2018 10:17), Max: 97.1 (16 Jan 2018 10:17)  HR: 85 (16 Jan 2018 10:17) (85 - 85)  BP: 104/59 (16 Jan 2018 10:17) (104/59 - 104/59)  BP(mean): --  RR: 15 (16 Jan 2018 10:17) (15 - 15)  SpO2: 100% (16 Jan 2018 10:17) (100% - 100%)

## 2018-01-16 NOTE — ED PROVIDER NOTE - NS ED ROS FT
CONST: no fevers, no chills  EYES: no pain  ENT: no sore throat   CV: no chest pain  RESP: no shortness of breath  ABD: diarrhea, post vomit yesterday, mild abdominal pain   : no dysuria  MSK: no back pain  NEURO: no headache or additional neurologic complaints  HEME: no easy bleeding  SKIN:  no rash

## 2018-01-16 NOTE — ED PROVIDER NOTE - PHYSICAL EXAMINATION
Adrian: A & O x 3, NAD, HEENT with dry tongue, otherwise and no facial asymmetry; lungs CTAB, heart with reg rhythm; abdomen soft with mild discomfort diffusely, extremities with no edema; skin with diffuse scaling lesions, old mrsa site appears well healed and is protected by gauze dressing, neuro exam non focal with no motor or sensory deficits

## 2018-01-16 NOTE — ED ADULT TRIAGE NOTE - CHIEF COMPLAINT QUOTE
C/O abdominal pain diarrhea x 5 days ago. Patient reports her doctor tested her stool and it came up positive for C-Diff. Patient currently on antibiotics.

## 2018-01-17 LAB
ADD ON TEST-SPECIMEN IN LAB: SIGNIFICANT CHANGE UP
ALBUMIN SERPL ELPH-MCNC: 2.7 G/DL — LOW (ref 3.3–5)
ALP SERPL-CCNC: 59 U/L — SIGNIFICANT CHANGE UP (ref 40–120)
ALT FLD-CCNC: 20 U/L — SIGNIFICANT CHANGE UP (ref 12–78)
ANION GAP SERPL CALC-SCNC: 11 MMOL/L — SIGNIFICANT CHANGE UP (ref 5–17)
AST SERPL-CCNC: 24 U/L — SIGNIFICANT CHANGE UP (ref 15–37)
BILIRUB SERPL-MCNC: 0.5 MG/DL — SIGNIFICANT CHANGE UP (ref 0.2–1.2)
BUN SERPL-MCNC: 39 MG/DL — HIGH (ref 7–23)
C DIFF BY PCR RESULT: SIGNIFICANT CHANGE UP
C DIFF TOX GENS STL QL NAA+PROBE: SIGNIFICANT CHANGE UP
CALCIUM SERPL-MCNC: 8.8 MG/DL — SIGNIFICANT CHANGE UP (ref 8.5–10.1)
CHLORIDE SERPL-SCNC: 112 MMOL/L — HIGH (ref 96–108)
CO2 SERPL-SCNC: 16 MMOL/L — LOW (ref 22–31)
CREAT SERPL-MCNC: 1.63 MG/DL — HIGH (ref 0.5–1.3)
GLUCOSE SERPL-MCNC: 71 MG/DL — SIGNIFICANT CHANGE UP (ref 70–99)
MAGNESIUM SERPL-MCNC: 1.9 MG/DL — SIGNIFICANT CHANGE UP (ref 1.6–2.6)
PHOSPHATE SERPL-MCNC: 2.2 MG/DL — LOW (ref 2.5–4.5)
POTASSIUM SERPL-MCNC: 3.6 MMOL/L — SIGNIFICANT CHANGE UP (ref 3.5–5.3)
POTASSIUM SERPL-SCNC: 3.6 MMOL/L — SIGNIFICANT CHANGE UP (ref 3.5–5.3)
PROT SERPL-MCNC: 6.1 GM/DL — SIGNIFICANT CHANGE UP (ref 6–8.3)
SODIUM SERPL-SCNC: 139 MMOL/L — SIGNIFICANT CHANGE UP (ref 135–145)

## 2018-01-17 RX ORDER — METOPROLOL TARTRATE 50 MG
50 TABLET ORAL
Qty: 0 | Refills: 0 | Status: DISCONTINUED | OUTPATIENT
Start: 2018-01-17 | End: 2018-01-21

## 2018-01-17 RX ADMIN — SIROLIMUS 0.5 MILLIGRAM(S): 1 SOLUTION ORAL at 13:34

## 2018-01-17 RX ADMIN — Medication 1 TABLET(S): at 09:21

## 2018-01-17 RX ADMIN — AMLODIPINE BESYLATE 5 MILLIGRAM(S): 2.5 TABLET ORAL at 09:21

## 2018-01-17 RX ADMIN — SODIUM CHLORIDE 100 MILLILITER(S): 9 INJECTION, SOLUTION INTRAVENOUS at 15:55

## 2018-01-17 RX ADMIN — Medication 125 MILLIGRAM(S): at 05:22

## 2018-01-17 RX ADMIN — Medication 5 MILLIGRAM(S): at 05:23

## 2018-01-17 RX ADMIN — Medication 100 MILLIGRAM(S): at 09:22

## 2018-01-17 RX ADMIN — Medication 125 MILLIGRAM(S): at 12:33

## 2018-01-17 RX ADMIN — SIMVASTATIN 20 MILLIGRAM(S): 20 TABLET, FILM COATED ORAL at 21:58

## 2018-01-17 RX ADMIN — Medication 100 MILLIGRAM(S): at 21:58

## 2018-01-17 RX ADMIN — Medication 100 MILLIGRAM(S): at 13:34

## 2018-01-17 RX ADMIN — Medication 100 MILLIGRAM(S): at 05:22

## 2018-01-17 RX ADMIN — Medication 1 TABLET(S): at 18:23

## 2018-01-17 RX ADMIN — Medication 125 MILLIGRAM(S): at 23:31

## 2018-01-17 RX ADMIN — Medication 125 MILLIGRAM(S): at 18:24

## 2018-01-17 RX ADMIN — Medication 81 MILLIGRAM(S): at 12:33

## 2018-01-17 RX ADMIN — Medication 2000 UNIT(S): at 12:33

## 2018-01-17 RX ADMIN — Medication 50 MILLIGRAM(S): at 18:23

## 2018-01-17 NOTE — CONSULT NOTE ADULT - ASSESSMENT
67F w/ PMH HTN, SCC of skin multiple over body, PCKD w/p renal transplant 2000, recent MRSA cellulitis RT leg discharged on PO Doxycycline presented with diarrhea >10times a day. Patient was on doxy when 5 days ago started having diarrhea, outpatient specimen was positive for cdiff. She states she was trying to drink enough PO fluids, but was unable to keep up with intake. Here, afebrile, wbc ct nl, was given PO vancomycin/IV flagyl. This is pts second episode of c diff.     1. recurrent C diff colitis/immuncompromised host  - slowly improving  - on oral vancomycin [id number] #2  - on IV flagyl [id number] #2  - continue with antibiotic coverage  - f/u cultures  - probiotics BID  - supportive care  - contact precautions  - f/u cbc  - monitor temps    2. other issues - care per medicine

## 2018-01-17 NOTE — PROGRESS NOTE ADULT - ASSESSMENT
68 yo female with PMHX of HTN, SCC, renal transplant in 2000, PCKD, CKD 3 w Cr ~ 1.3 - 1.6 , w recent diagnosis of SCC mets to LN of neck required local resection and completed recent radiation , MRSA leg now with Cdiff.        FREDDIE on CKD 3 ~ 1.3- 1.6 - prerenal   - Cr nearing baseline w IVF   - IVF to keep net +  - continue 1/2 NS with bicarbonate in the setting of metabolic acidosis  - daily labs/lytes  - Renally dose meds    Renal transplant   - continue sirolmus 0.5 mg daily and pred 5mg daily    - check level in tomorrow    - sirolimus goal ~ 3 per [facility] due to recurrent skin cancers    Cdiff + as outpatient  -Abx per ID/medicine  -repeat of stools pending  -Optimize oral intake as able  -isolation    HTN - Bp borderline    - reduce lopressor dose to 50 mg BID    d/w pt

## 2018-01-17 NOTE — CONSULT NOTE ADULT - SUBJECTIVE AND OBJECTIVE BOX
Patient is a 67y old  Female who presents with a chief complaint of diarrhea (16 Jan 2018 13:25)      HPI:  67F w/ PMH HTN, SCC of skin multiple over body, PCKD w/p renal transplant 2000, recent MRSA cellulitis RT leg discharged on PO Doxycycline presented with diarrhea >10times a day. Patient was on doxy when 5 days ago started having diarrhea, outpatient specimen was positive for cdiff. She states she was trying to drink enough PO fluids, but was unable to keep up with intake. Here, afebrile, wbc ct nl, was given PO vancomycin/IV flagyl. This is pts second episode of c diff.     feels better today  had loose bms o/n and one this am  no abd pain      PMH: as above    PSH: as above    Meds: per reconciliation sheet, noted below    MEDICATIONS  (STANDING):  amLODIPine   Tablet 5 milliGRAM(s) Oral daily  aspirin  chewable 81 milliGRAM(s) Oral daily  cholecalciferol 2000 Unit(s) Oral daily  lactobacillus acidophilus 1 Tablet(s) Oral two times a day with meals  metoprolol     tartrate 50 milliGRAM(s) Oral two times a day  metroNIDAZOLE  IVPB 500 milliGRAM(s) IV Intermittent every 8 hours  predniSONE   Tablet 5 milliGRAM(s) Oral daily  simvastatin 20 milliGRAM(s) Oral at bedtime  sirolimus 0.5 milliGRAM(s) Oral daily  sodium chloride 0.45% 1000 milliLiter(s) (100 mL/Hr) IV Continuous <Continuous>  vancomycin    Solution 125 milliGRAM(s) Oral every 6 hours      Allergies    bacitracin (Rash)  Levaquin (Sedation/Somnol; Muscle Pain)  tetracyclines (Other)    Intolerances        Social: no smoking, no alcohol, no illegal drugs; no recent travel, no exposure to TB    Family history:  No pertinent family history in first degree relatives      ROS:  no HA, no dizziness, no sore throat, no blurry vision, no CP, no palpitations, no SOB, no cough, no dysuria, no leg pain, no claudication, no rash, no joint aches, no rectal pain or bleeding, no night sweats    Vital Signs Last 24 Hrs  T(C): 36.7 (17 Jan 2018 10:00), Max: 36.8 (16 Jan 2018 19:30)  T(F): 98 (17 Jan 2018 10:00), Max: 98.2 (16 Jan 2018 19:30)  HR: 79 (17 Jan 2018 10:00) (60 - 86)  BP: 130/55 (17 Jan 2018 10:00) (106/45 - 140/48)  BP(mean): --  RR: 18 (17 Jan 2018 10:00) (15 - 18)  SpO2: 100% (17 Jan 2018 05:15) (98% - 100%)      PE:  Constitutional: frail looking  HEENT: NC/AT, EOMI, PERRLA  Neck: supple  Back: no tenderness  Respiratory: decreased breath sounds  Cardiovascular: S1S2 regular, no murmurs  Abdomen: soft, not tender, not distended, positive BS  Genitourinary: deferred  Rectal: deferred  Musculoskeletal: no muscle tenderness, no joint swelling or tenderness  Extremities: no pedal edema  Neurological: no focal deficits  Skin: no rashes    Labs:                        14.5   9.2   )-----------( 303      ( 16 Jan 2018 10:26 )             45.6     01-17    139  |  112<H>  |  39<H>  ----------------------------<  71  3.6   |  16<L>  |  1.63<H>    Ca    8.8      17 Jan 2018 07:46  Phos  2.2     01-17  Mg     1.9     01-17    TPro  6.1  /  Alb  2.7<L>  /  TBili  0.5  /  DBili  x   /  AST  24  /  ALT  20  /  AlkPhos  59  01-17     LIVER FUNCTIONS - ( 17 Jan 2018 07:46 )  Alb: 2.7 g/dL / Pro: 6.1 gm/dL / ALK PHOS: 59 U/L / ALT: 20 U/L / AST: 24 U/L / GGT: x               Culture Results:   Testing in progress (01-16 @ 17:23)    Ova and Parasites (01.16.18 @ 17:23)    Culture Results:   Testing in progress      Radiology:  < from: Xray Tibia + Fibula 2 Views, Right (01.05.18 @ 21:31) >    EXAM:  CR TIB-FIB RIGHT                            PROCEDURE DATE:  01/05/2018          INTERPRETATION:  CR TIB-FIB RIGHT    HISTORY: eval for subcutaneous air, MRSA infection of the right leg    TECHNIQUE: AP and lateral radiographs of the right tibia-fibula;        COMPARISON: None     FINDINGS:       The cortex is intact. There is no evidence of displaced fracture.    The visualized knee and ankle joints are intact, no dislocation.    No localized soft tissue swelling. No subcutaneous air.    IMPRESSION:    No subcutaneous air.    < from: Xray Abdomen 2 Views (01.16.18 @ 11:18) >    EXAM:  XR ABDOMEN 2V                            PROCEDURE DATE:  01/16/2018          INTERPRETATION:  CLINICAL STATEMENT: C. difficile evaluate megacolon    TECHNIQUE: Supine and upright views of the abdomen.    COMPARISON: None.    FINDINGS: Thereis a nonobstructive bowel gas pattern with air throughout   the colon. There is no colonic dilatation to suggest megacolon. There is   no evidence of free intraperitoneal air.    3 seen in the pelvis. Osseous structures are unremarkable.    IMPRESSION:    Nonobstructive bowel gas pattern. No plain film evidence of megacolon          Advanced directives addressed: full resuscitation

## 2018-01-18 LAB
ALBUMIN SERPL ELPH-MCNC: 2.8 G/DL — LOW (ref 3.3–5)
ANION GAP SERPL CALC-SCNC: 12 MMOL/L — SIGNIFICANT CHANGE UP (ref 5–17)
BUN SERPL-MCNC: 32 MG/DL — HIGH (ref 7–23)
CALCIUM SERPL-MCNC: 8.9 MG/DL — SIGNIFICANT CHANGE UP (ref 8.5–10.1)
CHLORIDE SERPL-SCNC: 109 MMOL/L — HIGH (ref 96–108)
CO2 SERPL-SCNC: 19 MMOL/L — LOW (ref 22–31)
CREAT SERPL-MCNC: 1.51 MG/DL — HIGH (ref 0.5–1.3)
CULTURE RESULTS: SIGNIFICANT CHANGE UP
GLUCOSE SERPL-MCNC: 92 MG/DL — SIGNIFICANT CHANGE UP (ref 70–99)
HCT VFR BLD CALC: 39.4 % — SIGNIFICANT CHANGE UP (ref 34.5–45)
HGB BLD-MCNC: 12.9 G/DL — SIGNIFICANT CHANGE UP (ref 11.5–15.5)
MCHC RBC-ENTMCNC: 29.2 PG — SIGNIFICANT CHANGE UP (ref 27–34)
MCHC RBC-ENTMCNC: 32.8 GM/DL — SIGNIFICANT CHANGE UP (ref 32–36)
MCV RBC AUTO: 89 FL — SIGNIFICANT CHANGE UP (ref 80–100)
PHOSPHATE SERPL-MCNC: 2 MG/DL — LOW (ref 2.5–4.5)
PLATELET # BLD AUTO: 260 K/UL — SIGNIFICANT CHANGE UP (ref 150–400)
POTASSIUM SERPL-MCNC: 2.9 MMOL/L — CRITICAL LOW (ref 3.5–5.3)
POTASSIUM SERPL-SCNC: 2.9 MMOL/L — CRITICAL LOW (ref 3.5–5.3)
RBC # BLD: 4.43 M/UL — SIGNIFICANT CHANGE UP (ref 3.8–5.2)
RBC # FLD: 14.1 % — SIGNIFICANT CHANGE UP (ref 10.3–14.5)
SIROLIMUS BLD-MCNC: 10.8 NG/ML — SIGNIFICANT CHANGE UP (ref 4.5–14)
SODIUM SERPL-SCNC: 140 MMOL/L — SIGNIFICANT CHANGE UP (ref 135–145)
SPECIMEN SOURCE: SIGNIFICANT CHANGE UP
WBC # BLD: 8 K/UL — SIGNIFICANT CHANGE UP (ref 3.8–10.5)
WBC # FLD AUTO: 8 K/UL — SIGNIFICANT CHANGE UP (ref 3.8–10.5)

## 2018-01-18 RX ORDER — POTASSIUM CHLORIDE 20 MEQ
10 PACKET (EA) ORAL
Qty: 0 | Refills: 0 | Status: COMPLETED | OUTPATIENT
Start: 2018-01-18 | End: 2018-01-18

## 2018-01-18 RX ORDER — POTASSIUM CHLORIDE 20 MEQ
40 PACKET (EA) ORAL DAILY
Qty: 0 | Refills: 0 | Status: DISCONTINUED | OUTPATIENT
Start: 2018-01-18 | End: 2018-01-21

## 2018-01-18 RX ADMIN — Medication 100 MILLIGRAM(S): at 06:13

## 2018-01-18 RX ADMIN — Medication 100 MILLIGRAM(S): at 22:20

## 2018-01-18 RX ADMIN — Medication 5 MILLIGRAM(S): at 06:13

## 2018-01-18 RX ADMIN — Medication 100 MILLIEQUIVALENT(S): at 10:45

## 2018-01-18 RX ADMIN — Medication 50 MILLIGRAM(S): at 18:46

## 2018-01-18 RX ADMIN — Medication 81 MILLIGRAM(S): at 12:56

## 2018-01-18 RX ADMIN — Medication 100 MILLIEQUIVALENT(S): at 16:05

## 2018-01-18 RX ADMIN — Medication 100 MILLIEQUIVALENT(S): at 12:47

## 2018-01-18 RX ADMIN — Medication 2000 UNIT(S): at 12:56

## 2018-01-18 RX ADMIN — AMLODIPINE BESYLATE 5 MILLIGRAM(S): 2.5 TABLET ORAL at 06:12

## 2018-01-18 RX ADMIN — Medication 125 MILLIGRAM(S): at 12:56

## 2018-01-18 RX ADMIN — Medication 125 MILLIGRAM(S): at 22:21

## 2018-01-18 RX ADMIN — Medication 40 MILLIEQUIVALENT(S): at 16:09

## 2018-01-18 RX ADMIN — Medication 50 MILLIGRAM(S): at 06:12

## 2018-01-18 RX ADMIN — Medication 1 TABLET(S): at 09:21

## 2018-01-18 RX ADMIN — SIROLIMUS 0.5 MILLIGRAM(S): 1 SOLUTION ORAL at 12:57

## 2018-01-18 RX ADMIN — Medication 62.5 MILLIMOLE(S): at 18:40

## 2018-01-18 RX ADMIN — Medication 125 MILLIGRAM(S): at 06:13

## 2018-01-18 RX ADMIN — Medication 100 MILLIGRAM(S): at 15:03

## 2018-01-18 RX ADMIN — Medication 125 MILLIGRAM(S): at 18:40

## 2018-01-18 RX ADMIN — SIMVASTATIN 20 MILLIGRAM(S): 20 TABLET, FILM COATED ORAL at 22:20

## 2018-01-18 RX ADMIN — Medication 1 TABLET(S): at 18:46

## 2018-01-18 NOTE — DIETITIAN INITIAL EVALUATION ADULT. - OTHER INFO
68yo female from community with PMH HTN, SCC of skin multiple over body, PCKD s/p renal transplant 2000, on immunosupressant tx , recent MRSA cellulitis RT leg discharged on PO Doxycycline presented with diarrhea >10times a day. Pt found to be (+) C.Diff with acute renal failure (improving on hydration). Upon visit, pt endorses no to meng PO intake x 1 week 2/2 no appetite 2/2 diarrhea; meeting <50% of estimated nutr needs x 1 week.  Pt with ~11% wt loss in 1 week (13#), clinically significant.  Pt with mild muscle wasting noted on clavicle.  Pt meets criteira for severe malnutrition in acute illness.  Pt's appetite has improved over the past 24hrs; now eating full meals.  D/w pt importance of adequate PO intake with protein at each meal.  As pt's PO intake/appetite has improved, pt is likely now meeting nutr needs.  encourage high protein snacks between meals.  To optimize PO intake, trial ensure clear once daily.  Answered pt's questions on diet/nutr.  encouraged pt to f/u with outpatient dietitian.  add MVI daily to ensure 100% RDI met.

## 2018-01-18 NOTE — DIETITIAN INITIAL EVALUATION ADULT. - ENERGY NEEDS
Ht.  65 "        Wt.  49.9 kg        18.3  BMI          IBW  57  kg               Pt is at  88  %  IBW

## 2018-01-18 NOTE — PROGRESS NOTE ADULT - ASSESSMENT
66 yo female with PMHX of HTN, SCC, renal transplant in 2000, PCKD, CKD 3 w Cr ~ 1.3 - 1.6 , w recent diagnosis of SCC mets to LN of neck required local resection and completed recent radiation , MRSA leg now with Cdiff.        FREDDIE on CKD 3 ~ 1.3- 1.6 - prerenal   - Cr nearing baseline w IVF   - IVF to keep net +  - continue 1/2 NS with bicarbonate in the setting of metabolic acidosis ( sec to GI losses )   - daily labs/lytes  - Renally dose meds    Renal transplant   - continue sirolmus 0.5 mg daily and pred 5mg daily    - check level - pendng    - sirolimus goal ~ 3 per [facility] due to recurrent skin cancers    Cdiff + as outpatient  -Abx per ID/medicine  -repeat of stools pending  -Optimize oral intake as able  -isolation    HTN - Bp borderline    - reduce lopressor dose to 50 mg BID   - hold norvasc    Hypokalemia and Hypophoshatemia   - replete K and Phos     d/w pt 66 yo female with PMHX of HTN, SCC, renal transplant in 2000, PCKD, CKD 3 w Cr ~ 1.3 - 1.6 , w recent diagnosis of SCC mets to LN of neck required local resection and completed recent radiation , MRSA leg now with Cdiff.        FREDDIE on CKD 3 ~ 1.3- 1.6 - prerenal   - Cr nearing baseline w IVF   - IVF to keep net +  - continue 1/2 NS with bicarbonate in the setting of metabolic acidosis ( sec to GI losses )   - daily labs/lytes  - Renally dose meds    Renal transplant   - continue sirolmus 0.5 mg daily and pred 5mg daily    - check level - pendng    - sirolimus goal ~ 3 per [facility] due to recurrent skin cancers    Cdiff + as outpatient  -Abx per ID/medicine  -repeat of stools pending  -Optimize oral intake as able  -isolation  -in future does pt require propylactic Vanco when taking abx ?? - d/w  with ID     HTN - Bp borderline    - reduce lopressor dose to 50 mg BID   - hold norvasc    Hypokalemia and Hypophoshatemia   - replete K and Phos     d/w pt

## 2018-01-18 NOTE — PROGRESS NOTE ADULT - ASSESSMENT
67F w/ PMH HTN, SCC of skin multiple over body, PCKD w/p renal transplant 2000, recent MRSA cellulitis RT leg discharged on PO Doxycycline presented with diarrhea >10times a day. Patient was on doxy when 5 days ago started having diarrhea, outpatient specimen was positive for cdiff. She states she was trying to drink enough PO fluids, but was unable to keep up with intake. Here, afebrile, wbc ct nl, was given PO vancomycin/IV flagyl. This is pts second episode of c diff.     1. recurrent C diff colitis/immuncompromised host  - slowly improving, although C diff PCR here negative, tested positive for C diff as outpt  - on oral vancomycin [id number] #3  - on IV flagyl [id number] #3  - continue with antibiotic coverage  - blood cx no growth  - probiotics BID  - supportive care  - contact precautions  - f/u cbc  - monitor temps    2. other issues - care per medicine

## 2018-01-18 NOTE — CHART NOTE - NSCHARTNOTEFT_GEN_A_CORE
Upon Nutritional Assessment by the Registered Dietitian your patient was determined to meet criteria / has evidence of the following diagnosis/diagnoses:          [ ]  Mild Protein Calorie Malnutrition        [ ]  Moderate Protein Calorie Malnutrition        [x] Severe Protein Calorie Malnutrition        [ ] Unspecified Protein Calorie Malnutrition        [ ] Underweight / BMI <19        [ ] Morbid Obesity / BMI > 40      Findings:  pt endorses no to meng PO intake x 1 week 2/2 no appetite 2/2 diarrhea; meeting <50% of estimated nutr needs x 1 week.  Pt with ~11% wt loss in 1 week (13#), clinically significant.  Pt with mild muscle wasting noted on clavicle.  Pt meets criteira for severe malnutrition in acute illness.    Findings as based on:  •  Comprehensive nutrition assessment and consultation  •  Calorie counts (nutrient intake analysis)  •  Food acceptance and intake status from observations by staff  •  Follow up  •  Patient education  •  Intervention secondary to interdisciplinary rounds  •   concerns      Treatment:    The following diet has been recommended:  1) add ensure clear once daily  2) encourage high protein snacks between meals  3) add MVI daily  4) weekly wt checks      PROVIDER Section:     By signing this assessment you are acknowledging and agree with the diagnosis/diagnoses assigned by the Registered Dietitian    Comments:

## 2018-01-19 LAB
ALBUMIN SERPL ELPH-MCNC: 2.6 G/DL — LOW (ref 3.3–5)
ANION GAP SERPL CALC-SCNC: 7 MMOL/L — SIGNIFICANT CHANGE UP (ref 5–17)
BUN SERPL-MCNC: 27 MG/DL — HIGH (ref 7–23)
CALCIUM SERPL-MCNC: 8.6 MG/DL — SIGNIFICANT CHANGE UP (ref 8.5–10.1)
CHLORIDE SERPL-SCNC: 111 MMOL/L — HIGH (ref 96–108)
CO2 SERPL-SCNC: 23 MMOL/L — SIGNIFICANT CHANGE UP (ref 22–31)
CREAT SERPL-MCNC: 1.36 MG/DL — HIGH (ref 0.5–1.3)
GLUCOSE SERPL-MCNC: 98 MG/DL — SIGNIFICANT CHANGE UP (ref 70–99)
PHOSPHATE SERPL-MCNC: 1.8 MG/DL — LOW (ref 2.5–4.5)
POTASSIUM SERPL-MCNC: 3.7 MMOL/L — SIGNIFICANT CHANGE UP (ref 3.5–5.3)
POTASSIUM SERPL-SCNC: 3.7 MMOL/L — SIGNIFICANT CHANGE UP (ref 3.5–5.3)
SIROLIMUS BLD-MCNC: 9.4 NG/ML — SIGNIFICANT CHANGE UP (ref 4.5–14)
SODIUM SERPL-SCNC: 141 MMOL/L — SIGNIFICANT CHANGE UP (ref 135–145)

## 2018-01-19 RX ORDER — SODIUM,POTASSIUM PHOSPHATES 278-250MG
1 POWDER IN PACKET (EA) ORAL
Qty: 0 | Refills: 0 | Status: COMPLETED | OUTPATIENT
Start: 2018-01-19 | End: 2018-01-19

## 2018-01-19 RX ADMIN — Medication 40 MILLIEQUIVALENT(S): at 11:18

## 2018-01-19 RX ADMIN — SIMVASTATIN 20 MILLIGRAM(S): 20 TABLET, FILM COATED ORAL at 22:23

## 2018-01-19 RX ADMIN — Medication 125 MILLIGRAM(S): at 11:18

## 2018-01-19 RX ADMIN — Medication 125 MILLIGRAM(S): at 05:44

## 2018-01-19 RX ADMIN — Medication 83.33 MILLIMOLE(S): at 16:14

## 2018-01-19 RX ADMIN — Medication 100 MILLIGRAM(S): at 22:23

## 2018-01-19 RX ADMIN — Medication 2000 UNIT(S): at 11:18

## 2018-01-19 RX ADMIN — Medication 50 MILLIGRAM(S): at 05:44

## 2018-01-19 RX ADMIN — Medication 100 MILLIGRAM(S): at 05:44

## 2018-01-19 RX ADMIN — Medication 125 MILLIGRAM(S): at 22:50

## 2018-01-19 RX ADMIN — Medication 5 MILLIGRAM(S): at 05:44

## 2018-01-19 RX ADMIN — Medication 50 MILLIGRAM(S): at 18:55

## 2018-01-19 RX ADMIN — Medication 1 TABLET(S): at 09:00

## 2018-01-19 RX ADMIN — Medication 125 MILLIGRAM(S): at 18:50

## 2018-01-19 RX ADMIN — Medication 1 TABLET(S): at 18:55

## 2018-01-19 RX ADMIN — Medication 1 PACKET(S): at 15:02

## 2018-01-19 RX ADMIN — Medication 81 MILLIGRAM(S): at 11:19

## 2018-01-19 RX ADMIN — Medication 100 MILLIGRAM(S): at 16:15

## 2018-01-19 RX ADMIN — SIROLIMUS 0.5 MILLIGRAM(S): 1 SOLUTION ORAL at 15:02

## 2018-01-19 NOTE — PROGRESS NOTE ADULT - ASSESSMENT
68 yo female with PMHX of HTN, SCC, renal transplant in 2000, PCKD, CKD 3 w Cr ~ 1.3 - 1.6 , w recent diagnosis of SCC mets to LN of neck required local resection and completed recent radiation , MRSA leg now with Cdiff.        FREDDIE on CKD 3 ~ 1.3- 1.6 - prerenal   - Cr nearing baseline   - monitor off IVF   - daily labs/lytes  - Renally dose meds    Metabolic Acidosis sec to diarrhea   - monitor off IVF - if HC drops then po bicarb    Renal transplant   - continue sirolmus 0.5 mg daily and pred 5mg daily    - sirolimus level drawn early - repeat level today    - sirolimus goal ~ 3 per [facility] due to recurrent skin cancers    Cdiff + as outpatient  - Abx per ID/medicine  - in future does pt require propylactic Vanco when taking abx?     HTN - Bp borderline    - Lopressor dose to 50 mg BID   - holding norvasc    Hypokalemia and Hypophoshatemia   - replete K and Phos - neutra phos and NaPHos IV today    - continue oral KCL for now     d/w pt

## 2018-01-19 NOTE — PROGRESS NOTE ADULT - ASSESSMENT
67F w/ PMH HTN, SCC of skin multiple over body, PCKD w/p renal transplant 2000, recent MRSA cellulitis RT leg discharged on PO Doxycycline presented with diarrhea >10times a day. Patient was on doxy when 5 days ago started having diarrhea, outpatient specimen was positive for cdiff. She states she was trying to drink enough PO fluids, but was unable to keep up with intake. Here, afebrile, wbc ct nl, was given PO vancomycin/IV flagyl. This is pts second episode of c diff.     1. recurrent C diff colitis/immuncompromised host  - slowly improving, although C diff PCR here negative, tested positive for C diff as outpt  - on oral vancomycin [id number] #4  - on IV flagyl [id number] #4  - continue with antibiotic coverage  - once improved, d/c IV flagyl and plan for 14 days of oral vanco [id number]   - blood cx no growth  - probiotics BID  - supportive care  - contact precautions  - f/u cbc  - monitor temps    2. other issues - care per medicine

## 2018-01-20 LAB
ALBUMIN SERPL ELPH-MCNC: 2.3 G/DL — LOW (ref 3.3–5)
ANION GAP SERPL CALC-SCNC: 9 MMOL/L — SIGNIFICANT CHANGE UP (ref 5–17)
BUN SERPL-MCNC: 26 MG/DL — HIGH (ref 7–23)
CALCIUM SERPL-MCNC: 7.9 MG/DL — LOW (ref 8.5–10.1)
CHLORIDE SERPL-SCNC: 113 MMOL/L — HIGH (ref 96–108)
CO2 SERPL-SCNC: 19 MMOL/L — LOW (ref 22–31)
CREAT SERPL-MCNC: 1.36 MG/DL — HIGH (ref 0.5–1.3)
CULTURE RESULTS: SIGNIFICANT CHANGE UP
GLUCOSE SERPL-MCNC: 107 MG/DL — HIGH (ref 70–99)
PHOSPHATE SERPL-MCNC: 2.5 MG/DL — SIGNIFICANT CHANGE UP (ref 2.5–4.5)
POTASSIUM SERPL-MCNC: 3.7 MMOL/L — SIGNIFICANT CHANGE UP (ref 3.5–5.3)
POTASSIUM SERPL-SCNC: 3.7 MMOL/L — SIGNIFICANT CHANGE UP (ref 3.5–5.3)
SODIUM SERPL-SCNC: 141 MMOL/L — SIGNIFICANT CHANGE UP (ref 135–145)
SPECIMEN SOURCE: SIGNIFICANT CHANGE UP

## 2018-01-20 RX ORDER — SIROLIMUS 1 MG/ML
0.5 SOLUTION ORAL DAILY
Qty: 0 | Refills: 0 | Status: CANCELLED | OUTPATIENT
Start: 2018-01-22 | End: 2018-01-21

## 2018-01-20 RX ADMIN — Medication 1 TABLET(S): at 08:11

## 2018-01-20 RX ADMIN — Medication 100 MILLIGRAM(S): at 06:31

## 2018-01-20 RX ADMIN — Medication 100 MILLIGRAM(S): at 22:36

## 2018-01-20 RX ADMIN — SIROLIMUS 0.5 MILLIGRAM(S): 1 SOLUTION ORAL at 11:41

## 2018-01-20 RX ADMIN — Medication 1 TABLET(S): at 17:17

## 2018-01-20 RX ADMIN — Medication 125 MILLIGRAM(S): at 06:31

## 2018-01-20 RX ADMIN — Medication 125 MILLIGRAM(S): at 22:37

## 2018-01-20 RX ADMIN — Medication 81 MILLIGRAM(S): at 11:40

## 2018-01-20 RX ADMIN — SIMVASTATIN 20 MILLIGRAM(S): 20 TABLET, FILM COATED ORAL at 22:36

## 2018-01-20 RX ADMIN — Medication 40 MILLIEQUIVALENT(S): at 11:40

## 2018-01-20 RX ADMIN — Medication 5 MILLIGRAM(S): at 06:31

## 2018-01-20 RX ADMIN — Medication 100 MILLIGRAM(S): at 14:43

## 2018-01-20 RX ADMIN — Medication 2000 UNIT(S): at 11:40

## 2018-01-20 RX ADMIN — Medication 125 MILLIGRAM(S): at 17:18

## 2018-01-20 RX ADMIN — Medication 50 MILLIGRAM(S): at 06:33

## 2018-01-20 RX ADMIN — Medication 125 MILLIGRAM(S): at 11:41

## 2018-01-20 NOTE — PROGRESS NOTE ADULT - ASSESSMENT
68 yo female with PMHX of HTN, SCC, renal transplant in 2000, PCKD, CKD 3 w Cr ~ 1.3 - 1.6 , w recent diagnosis of SCC mets to LN of neck required local resection and completed recent radiation , MRSA leg now with Cdiff.        FREDDIE on CKD 3 ~ 1.3- 1.6 - prerenal   - Cr near base  - daily labs/lytes  - regular diet no renal restriction  - encourage po   - Renally dose meds    Metabolic Acidosis sec to diarrhea   - monitor CO2 - if continues to drop then po bicarb tablets     Renal transplant   - sirolimus levels remains elevated due to IV flagyl? - recieved dose today    - hold sirolimus tomorrow dose and check level in office if pt is discharged   - sirolimus goal ~ 3 per [facility] due to recurrent skin cancers    Cdiff + as outpatient  - Abx duration per ID - vanco x 14 days   - in future does pt require propylactic Vanco when taking abx?   - probiotics     HTN - Bp stable    - Lopressor 50 mg BID - monitor HR    - holding norvasc    Hypokalemia and Hypophoshatemia   - monitor lytes     - continue oral KCL for now     d/w pt

## 2018-01-20 NOTE — PROGRESS NOTE ADULT - PROVIDER SPECIALTY LIST ADULT
Hospitalist
Infectious Disease
Infectious Disease
Nephrology

## 2018-01-20 NOTE — PROGRESS NOTE ADULT - SUBJECTIVE AND OBJECTIVE BOX
Patient is a 67y Female whom presented to the hospital with HTN, SCC of skin multiple sites over body, PCKD w s/p renal transplant 2000, CKD 3 - Cr ~ 1.3  recent MRSA of leg home on doxy and oral vanco fpr + C diff , here with diarrhea.  Patient called presented with complaint of diarrhea and poor intake despite treatments. Renal eval calleld for FREDDIE on CKD     today   no sob or cp   still w loose stools  tolerating po      PAST MEDICAL & SURGICAL HISTORY:  MVP (mitral valve prolapse)  Raynaud's disease  Squamous cell carcinoma: multiple extensive lesions of torso-all extremities-face/scalp  Hypertension  Hyperlipidemia  Polycystic kidney disease  Fibroid uterus  H/O lumpectomy: left  History of incisional hernia repair  History of kidney transplant: left  History of Mohs surgery for squamous cell carcinoma of skin: recent left arm procedure-multiple Moh&#x27;s procedure in the past      MEDICATIONS  (STANDING):  aspirin  chewable 81 milliGRAM(s) Oral daily  cholecalciferol 2000 Unit(s) Oral daily  lactobacillus acidophilus 1 Tablet(s) Oral two times a day with meals  metoprolol     tartrate 50 milliGRAM(s) Oral two times a day  metroNIDAZOLE  IVPB 500 milliGRAM(s) IV Intermittent every 8 hours  potassium chloride    Tablet ER 40 milliEquivalent(s) Oral daily  potassium phosphate / sodium phosphate powder 1 Packet(s) Oral with lunch  predniSONE   Tablet 5 milliGRAM(s) Oral daily  simvastatin 20 milliGRAM(s) Oral at bedtime  sirolimus 0.5 milliGRAM(s) Oral daily  sodium phosphate IVPB 30 milliMole(s) IV Intermittent once  vancomycin    Solution 125 milliGRAM(s) Oral every 6 hours    Allergies    bacitracin (Rash)  Levaquin (Sedation/Somnol; Muscle Pain)  tetracyclines (Other)    Intolerances        SOCIAL HISTORY:  no cigg/etoh    FAMILY HISTORY:  No pertinent family history in first degree relatives      REVIEW OF SYSTEMS:    CONSTITUTIONAL: + weakness, fevers or chills  EYES/ENT: No visual changes;  No vertigo or throat pain   NECK: No pain or stiffness  RESPIRATORY: No cough, wheezing, hemoptysis; No shortness of breath  CARDIOVASCULAR: No chest pain or palpitations  GASTROINTESTINAL: + diarrhea.   GENITOURINARY: No dysuria, frequency or hematuria  NEUROLOGICAL: No numbness or weakness  SKIN: No itching, burning, rashes, or lesions   All other review of systems is negative unless indicated above.    Vital Signs Last 24 Hrs  T(C): 37.1 (19 Jan 2018 05:49), Max: 37.1 (19 Jan 2018 05:49)  T(F): 98.8 (19 Jan 2018 05:49), Max: 98.8 (19 Jan 2018 05:49)  HR: 82 (19 Jan 2018 05:49) (61 - 82)  BP: 130/53 (19 Jan 2018 05:49) (130/53 - 137/66)  BP(mean): --  RR: 18 (19 Jan 2018 05:49) (18 - 18)  SpO2: 97% (19 Jan 2018 05:49) (96% - 98%)    PHYSICAL EXAM:    Constitutional: frail, dry MMM  Neck: No LAD, No JVD  Respiratory: CTAB  Cardiovascular: S1 and S2, RRR  Gastrointestinal: soft, NT  Extremities: No peripheral edema  Neurological: A/O x 3, no focal deficits  Psychiatric: Normal mood, normal affect  : No Manning  Skin: No rashes  Access: Not applicable        LABS:                                                                 12.9   8.0   )-----------( 260      ( 18 Jan 2018 07:07 )             39.4     141    |  111    |  27     ----------------------------<  98        19 Jan 2018 08:39  3.7     |  23     |  1.36     140    |  109    |  32     ----------------------------<  92        18 Jan 2018 07:07  2.9     |  19     |  1.51     139    |  112    |  39     ----------------------------<  71        17 Jan 2018 07:46  3.6     |  16     |  1.63     Ca    8.6        19 Jan 2018 08:39  Ca    8.9        18 Jan 2018 07:07    Phos  1.8       19 Jan 2018 08:39  Phos  2.0       18 Jan 2018 07:07    Mg     1.9       17 Jan 2018 07:46        RADIOLOGY & ADDITIONAL STUDIES:
66 yo WF w/ PMH HTN, SCC of skin multiple over body, PCKD s/p renal transplant 2000, on immunosupressant tx , recent MRSA cellulitis RT leg discharged on PO Doxycycline presented with diarrhea >10times a day. Patient was on doxy when 5 days ago started having diarrhea, outpatient specimen was positive for cdiff. She states she was trying to drink enough PO fluids, but was unable to keep up with intake.   -found to have ARF as well    1.17: no abd pain, +diarrhea  1.18: c/o abd discomfort, not pain, +liquid diarrhea            REVIEW OF SYSTEMS:    CONSTITUTIONAL: No weakness, No fevers or chills  ENT: No ear ache, No sorethroat  NECK: No pain, No stiffness  RESPIRATORY: No cough, No wheezing, No hemoptysis; No dyspnea  CARDIOVASCULAR: No chest pain, No palpitations  GASTROINTESTINAL: No abd pain, No nausea, No vomiting, No hematemesis, No diarrhea or constipation. No melena, No hematochezia.  GENITOURINARY: No dysuria, No  hematuria  NEUROLOGICAL: No diplopia, No paresthesia, No motor dysfunction  MUSCULOSKELETAL: No arthralgia, No myalgia  SKIN: No rashes, or lesions   PSYCH: no anxiety, no suicidal ideation    All other review of systems is negative unless indicated above    Vital Signs Last 24 Hrs  T(C): 36.7 (18 Jan 2018 12:01), Max: 36.8 (18 Jan 2018 04:47)  T(F): 98.1 (18 Jan 2018 12:01), Max: 98.2 (18 Jan 2018 04:47)  HR: 76 (18 Jan 2018 12:01) (76 - 98)  BP: 135/56 (18 Jan 2018 12:01) (116/79 - 135/56)  RR: 18 (18 Jan 2018 12:01) (18 - 18)  SpO2: 96% (18 Jan 2018 12:01) (96% - 98%)    PHYSICAL EXAM:    GENERAL: NAD, Well nourished  HEENT:  NC/AT, EOMI, PERRLA, No scleral icterus, Moist mucous membranes  NECK: Supple, No JVD  CNS:  Alert & Oriented X3, Motor Strength 5/5 B/L upper and lower extremities; DTRs 2+ intact   LUNG: Normal Breath sounds, Clear to auscultation bilaterally, No rales, No rhonchi, No wheezing  HEART: RRR; No murmurs, No rubs  ABDOMEN: +BS, ST/ND/NT  GENITOURINARY: Voiding, Bladder not distended  EXTREMITIES:  2+ Peripheral Pulses, No clubbing, No cyanosis, No tibial edema  MUSCULOSKELTAL: Joints normal ROM, No TTP, No effusion  VAGINAL: deferred  SKIN: no rashes  RECTAL: deferred, not indicated  BREAST: deferred               Labs:                        12.9   8.0   )-----------( 260      ( 18 Jan 2018 07:07 )             39.4       140  |  109<H>  |  32<H>  ----------------------------<  92  2.9<LL>   |  19<L>  |  1.51<H>    Ca    8.9      18 Jan 2018 07:07  Phos  2.0     01-18  Mg     1.9     01-17    TPro  x   /  Alb  2.8<L>  /  TBili  x   /  DBili  x   /  AST  x   /  ALT  x   /  AlkPhos  x   01-18          MEDICATIONS  (STANDING):  amLODIPine   Tablet 5 milliGRAM(s) Oral daily  aspirin  chewable 81 milliGRAM(s) Oral daily  cholecalciferol 2000 Unit(s) Oral daily  lactobacillus acidophilus 1 Tablet(s) Oral two times a day with meals  metoprolol     tartrate 50 milliGRAM(s) Oral two times a day  metroNIDAZOLE  IVPB 500 milliGRAM(s) IV Intermittent every 8 hours  predniSONE   Tablet 5 milliGRAM(s) Oral daily  simvastatin 20 milliGRAM(s) Oral at bedtime  sirolimus 0.5 milliGRAM(s) Oral daily  sodium chloride 0.45% 1000 milliLiter(s) (100 mL/Hr) IV Continuous <Continuous>  vancomycin    Solution 125 milliGRAM(s) Oral every 6 hours    MEDICATIONS  (PRN):  acetaminophen   Tablet 650 milliGRAM(s) Oral every 6 hours PRN For Temp greater than 38 C (100.4 F)  acetaminophen   Tablet. 650 milliGRAM(s) Oral every 6 hours PRN Mild Pain (1 - 3)  ondansetron Injectable 4 milliGRAM(s) IV Push every 6 hours PRN Nausea and/or Vomiting      all labs reviewed  all imaging reviewed    Assessment and Plan:    1. Cdiff colitis:  Vancomycin orally, Flagyl IV day#3    2. Acute renal failure: Cr improving to 1.5today  IV hydration   Nephrology evaluation    3. s/p renal transplant:  cw Prednisone/Sirolimus  f/u Sirolimus levels    4. Hypotension: resolved, due to volume depletion   Lopressor decreased    5. Hypokalemia: replete
66 yo WF w/ PMH HTN, SCC of skin multiple over body, PCKD s/p renal transplant 2000, on immunosupressant tx , recent MRSA cellulitis RT leg discharged on PO Doxycycline presented with diarrhea >10times a day. Patient was on doxy when 5 days ago started having diarrhea, outpatient specimen was positive for cdiff. She states she was trying to drink enough PO fluids, but was unable to keep up with intake.   -found to have ARF as well    1.17: no abd pain, +diarrhea  1.18: c/o abd discomfort, not pain, +liquid diarrhea  1.19: c/o diarrhea, no vomiting, no fever/chills  1/20- loose stool, but no diarrhea. Wants to stay in the hospital one more day    REVIEW OF SYSTEMS:  CONSTITUTIONAL: No weakness, No fevers or chills  ENT: No ear ache, No sorethroat  NECK: No pain, No stiffness  RESPIRATORY: No cough, No wheezing, No hemoptysis; No dyspnea  CARDIOVASCULAR: No chest pain, No palpitations  GASTROINTESTINAL: No abd pain, No nausea, No vomiting, No hematemesis, ++ diarrhea, No melena, No hematochezia.  GENITOURINARY: No dysuria, No  hematuria  NEUROLOGICAL: No diplopia, No paresthesia, No motor dysfunction  MUSCULOSKELETAL: No arthralgia, No myalgia  SKIN: No rashes, or lesions   PSYCH: no anxiety, no suicidal ideation    All other review of systems is negative unless indicated above    Vital Signs Last 24 Hrs  T(C): 36.9 (20 Jan 2018 12:27), Max: 37.2 (20 Jan 2018 05:01)  T(F): 98.4 (20 Jan 2018 12:27), Max: 99 (20 Jan 2018 05:01)  HR: 48 (20 Jan 2018 12:27) (48 - 63)  BP: 143/57 (20 Jan 2018 12:27) (122/72 - 143/57)  BP(mean): --  RR: 18 (20 Jan 2018 12:27) (17 - 18)  SpO2: 100% (20 Jan 2018 12:27) (98% - 100%)    PHYSICAL EXAM:  GENERAL: NAD, Well nourished  HEENT:  NC/AT, EOMI, PERRLA, No scleral icterus, Moist mucous membranes  NECK: Supple, No JVD  CNS:  Alert & Oriented X3, Motor Strength 5/5 B/L upper and lower extremities; DTRs 2+ intact   LUNG: Normal Breath sounds, Clear to auscultation bilaterally, No rales, No rhonchi, No wheezing  HEART: RRR; No murmurs, No rubs  ABDOMEN: +BS, ST/ND/NT  GENITOURINARY: Voiding, Bladder not distended  EXTREMITIES:  2+ Peripheral Pulses, No clubbing, No cyanosis, No tibial edema  MUSCULOSKELTAL: Joints normal ROM, No TTP, No effusion           Labs:    20 Jan 2018 07:30    141    |  113    |  26     ----------------------------<  107    3.7     |  19     |  1.36     Ca    7.9        20 Jan 2018 07:30  Phos  2.5       20 Jan 2018 07:30    TPro  x      /  Alb  2.3    /  TBili  x      /  DBili  x      /  AST  x      /  ALT  x      /  AlkPhos  x      20 Jan 2018 07:30  LIVER FUNCTIONS - ( 20 Jan 2018 07:30 )  Alb: 2.3 g/dL / Pro: x     / ALK PHOS: x     / ALT: x     / AST: x     / GGT: x           MEDICATIONS  (STANDING):  amLODIPine   Tablet 5 milliGRAM(s) Oral daily  aspirin  chewable 81 milliGRAM(s) Oral daily  cholecalciferol 2000 Unit(s) Oral daily  lactobacillus acidophilus 1 Tablet(s) Oral two times a day with meals  metoprolol     tartrate 50 milliGRAM(s) Oral two times a day  metroNIDAZOLE  IVPB 500 milliGRAM(s) IV Intermittent every 8 hours  predniSONE   Tablet 5 milliGRAM(s) Oral daily  simvastatin 20 milliGRAM(s) Oral at bedtime  sirolimus 0.5 milliGRAM(s) Oral daily  sodium chloride 0.45% 1000 milliLiter(s) (100 mL/Hr) IV Continuous <Continuous>  vancomycin    Solution 125 milliGRAM(s) Oral every 6 hours    MEDICATIONS  (PRN):  acetaminophen   Tablet 650 milliGRAM(s) Oral every 6 hours PRN For Temp greater than 38 C (100.4 F)  acetaminophen   Tablet. 650 milliGRAM(s) Oral every 6 hours PRN Mild Pain (1 - 3)  ondansetron Injectable 4 milliGRAM(s) IV Push every 6 hours PRN Nausea and/or Vomiting    Assessment and Plan:  1. Cdiff colitis:  Vancomycin PO for 2 weeks  ID f/u appreciated    2. Acute renal failure- resolving  Monitor BMP  Renal f/u appreciated    3. s/p renal transplant:  cw Prednisone/Sirolimus  f/u Sirolimus levels    4. Hypotension: resolved, due to volume depletion   Lopressor decreased    5. Hypokalemia/Hypophosphatemia: repleted    #Dispo- likely home tomorrow
67F w/ PMH HTN, SCC of skin multiple over body, PCKD w/p renal transplant 2000, recent MRSA cellulitis RT leg discharged on PO Doxycycline presented with diarrhea >10times a day. Patient was on doxy when 5 days ago started having diarrhea, outpatient specimen was positive for cdiff. She states she was trying to drink enough PO fluids, but was unable to keep up with intake. Here, afebrile, wbc ct nl, was given PO vancomycin/IV flagyl. This is pts second episode of c diff.     about 6 loose bms yest some this am  eating more  no fevers  feels better    MEDICATIONS  (STANDING):  aspirin  chewable 81 milliGRAM(s) Oral daily  cholecalciferol 2000 Unit(s) Oral daily  lactobacillus acidophilus 1 Tablet(s) Oral two times a day with meals  metoprolol     tartrate 50 milliGRAM(s) Oral two times a day  metroNIDAZOLE  IVPB 500 milliGRAM(s) IV Intermittent every 8 hours  potassium chloride    Tablet ER 40 milliEquivalent(s) Oral daily  predniSONE   Tablet 5 milliGRAM(s) Oral daily  simvastatin 20 milliGRAM(s) Oral at bedtime  sirolimus 0.5 milliGRAM(s) Oral daily  vancomycin    Solution 125 milliGRAM(s) Oral every 6 hours      Vital Signs Last 24 Hrs  T(C): 37.1 (19 Jan 2018 05:49), Max: 37.1 (19 Jan 2018 05:49)  T(F): 98.8 (19 Jan 2018 05:49), Max: 98.8 (19 Jan 2018 05:49)  HR: 82 (19 Jan 2018 05:49) (61 - 82)  BP: 130/53 (19 Jan 2018 05:49) (130/53 - 137/66)  BP(mean): --  RR: 18 (19 Jan 2018 05:49) (18 - 18)  SpO2: 97% (19 Jan 2018 05:49) (96% - 98%)          PE:  Constitutional: frail looking  HEENT: NC/AT, EOMI, PERRLA  Neck: supple  Back: no tenderness  Respiratory: decreased breath sounds  Cardiovascular: S1S2 regular, no murmurs  Abdomen: soft, not tender, not distended, positive BS  Genitourinary: deferred  Rectal: deferred  Musculoskeletal: no muscle tenderness, no joint swelling or tenderness  Extremities: no pedal edema  Neurological: no focal deficits  Skin: no rashes    Labs:                                              12.9   8.0   )-----------( 260      ( 18 Jan 2018 07:07 )             39.4     01-19    141  |  111<H>  |  27<H>  ----------------------------<  98  3.7   |  23  |  1.36<H>    Ca    8.6      19 Jan 2018 08:39  Phos  1.8     01-19    TPro  x   /  Alb  2.6<L>  /  TBili  x   /  DBili  x   /  AST  x   /  ALT  x   /  AlkPhos  x   01-19           Culture - Stool (01.17.18 @ 07:30)    Specimen Source: .Stool Feces    Culture Results:   No enteric pathogens to date: Final culture pending    Ova and Parasites (01.16.18 @ 17:23)    Culture Results:   Testing in progress    Culture - Blood (01.05.18 @ 17:08)    Specimen Source: .Blood Blood-Peripheral    Culture Results:   No growth at 5 days.        Radiology:  < from: Xray Tibia + Fibula 2 Views, Right (01.05.18 @ 21:31) >    EXAM:  CR TIB-FIB RIGHT                            PROCEDURE DATE:  01/05/2018          INTERPRETATION:  CR TIB-FIB RIGHT    HISTORY: eval for subcutaneous air, MRSA infection of the right leg    TECHNIQUE: AP and lateral radiographs of the right tibia-fibula;        COMPARISON: None     FINDINGS:       The cortex is intact. There is no evidence of displaced fracture.    The visualized knee and ankle joints are intact, no dislocation.    No localized soft tissue swelling. No subcutaneous air.    IMPRESSION:    No subcutaneous air.    < from: Xray Abdomen 2 Views (01.16.18 @ 11:18) >    EXAM:  XR ABDOMEN 2V                            PROCEDURE DATE:  01/16/2018          INTERPRETATION:  CLINICAL STATEMENT: C. difficile evaluate megacolon    TECHNIQUE: Supine and upright views of the abdomen.    COMPARISON: None.    FINDINGS: Thereis a nonobstructive bowel gas pattern with air throughout   the colon. There is no colonic dilatation to suggest megacolon. There is   no evidence of free intraperitoneal air.    3 seen in the pelvis. Osseous structures are unremarkable.    IMPRESSION:    Nonobstructive bowel gas pattern. No plain film evidence of megacolon          Advanced directives addressed: full resuscitation
68 yo WF w/ PMH HTN, SCC of skin multiple over body, PCKD s/p renal transplant 2000, on immunosupressant tx , recent MRSA cellulitis RT leg discharged on PO Doxycycline presented with diarrhea >10times a day. Patient was on doxy when 5 days ago started having diarrhea, outpatient specimen was positive for cdiff. She states she was trying to drink enough PO fluids, but was unable to keep up with intake.   -found to have ARF as well    1.17: no abd pain, +diarrhea            REVIEW OF SYSTEMS:    CONSTITUTIONAL: No weakness, No fevers or chills  ENT: No ear ache, No sorethroat  NECK: No pain, No stiffness  RESPIRATORY: No cough, No wheezing, No hemoptysis; No dyspnea  CARDIOVASCULAR: No chest pain, No palpitations  GASTROINTESTINAL: No abd pain, No nausea, No vomiting, No hematemesis, No diarrhea or constipation. No melena, No hematochezia.  GENITOURINARY: No dysuria, No  hematuria  NEUROLOGICAL: No diplopia, No paresthesia, No motor dysfunction  MUSCULOSKELETAL: No arthralgia, No myalgia  SKIN: No rashes, or lesions   PSYCH: no anxiety, no suicidal ideation    All other review of systems is negative unless indicated above    Vital Signs Last 24 Hrs  T(C): 36.7 (17 Jan 2018 10:00), Max: 36.8 (16 Jan 2018 19:30)  T(F): 98 (17 Jan 2018 10:00), Max: 98.2 (16 Jan 2018 19:30)  HR: 79 (17 Jan 2018 10:00) (60 - 86)  BP: 130/55 (17 Jan 2018 10:00) (106/45 - 140/48)  RR: 18 (17 Jan 2018 10:00) (15 - 18)  SpO2: 100% (17 Jan 2018 05:15) (99% - 100%)    PHYSICAL EXAM:    GENERAL: NAD, Well nourished  HEENT:  NC/AT, EOMI, PERRLA, No scleral icterus, Moist mucous membranes  NECK: Supple, No JVD  CNS:  Alert & Oriented X3, Motor Strength 5/5 B/L upper and lower extremities; DTRs 2+ intact   LUNG: Normal Breath sounds, Clear to auscultation bilaterally, No rales, No rhonchi, No wheezing  HEART: RRR; No murmurs, No rubs  ABDOMEN: +BS, ST/ND/NT  GENITOURINARY: Voiding, Bladder not distended  EXTREMITIES:  2+ Peripheral Pulses, No clubbing, No cyanosis, No tibial edema  MUSCULOSKELTAL: Joints normal ROM, No TTP, No effusion  VAGINAL: deferred  SKIN: no rashes  RECTAL: deferred, not indicated  BREAST: deferred               Labs:                        14.5   9.2   )-----------( 303      ( 16 Jan 2018 10:26 )             45.6     139  |  112<H>  |  39<H>  ----------------------------<  71  3.6   |  16<L>  |  1.63<H>    Ca    8.8      17 Jan 2018 07:46  Phos  2.2     01-17  Mg     1.9     01-17    TPro  6.1  /  Alb  2.7<L>  /  TBili  0.5  /  DBili  x   /  AST  24  /  ALT  20  /  AlkPhos  59  01-17           Cultures:     MEDICATIONS  (STANDING):  amLODIPine   Tablet 5 milliGRAM(s) Oral daily  aspirin  chewable 81 milliGRAM(s) Oral daily  cholecalciferol 2000 Unit(s) Oral daily  lactobacillus acidophilus 1 Tablet(s) Oral two times a day with meals  metoprolol     tartrate 50 milliGRAM(s) Oral two times a day  metroNIDAZOLE  IVPB 500 milliGRAM(s) IV Intermittent every 8 hours  predniSONE   Tablet 5 milliGRAM(s) Oral daily  simvastatin 20 milliGRAM(s) Oral at bedtime  sirolimus 0.5 milliGRAM(s) Oral daily  sodium chloride 0.45% 1000 milliLiter(s) (100 mL/Hr) IV Continuous <Continuous>  vancomycin    Solution 125 milliGRAM(s) Oral every 6 hours    MEDICATIONS  (PRN):  acetaminophen   Tablet 650 milliGRAM(s) Oral every 6 hours PRN For Temp greater than 38 C (100.4 F)  acetaminophen   Tablet. 650 milliGRAM(s) Oral every 6 hours PRN Mild Pain (1 - 3)  ondansetron Injectable 4 milliGRAM(s) IV Push every 6 hours PRN Nausea and/or Vomiting      all labs reviewed  all imaging reviewed    Assessment and Plan:    1. Cdiff colitis:  Vancomycin orally, Flagyl IV    2. Acute renal failure:  IV hydration   Nephrology evalation    3. s/p renal transplant:  cw Prednisone/Sirolimus  f/u Sirolimus levels    4. Hypotension: resolved, due to volume depletion   Lopressor decreased
68 yo WF w/ PMH HTN, SCC of skin multiple over body, PCKD s/p renal transplant 2000, on immunosupressant tx , recent MRSA cellulitis RT leg discharged on PO Doxycycline presented with diarrhea >10times a day. Patient was on doxy when 5 days ago started having diarrhea, outpatient specimen was positive for cdiff. She states she was trying to drink enough PO fluids, but was unable to keep up with intake.   -found to have ARF as well    1.17: no abd pain, +diarrhea  1.18: c/o abd discomfort, not pain, +liquid diarrhea  1.19: c/o diarrhea, no vomiting, no fever/chills            REVIEW OF SYSTEMS:    CONSTITUTIONAL: No weakness, No fevers or chills  ENT: No ear ache, No sorethroat  NECK: No pain, No stiffness  RESPIRATORY: No cough, No wheezing, No hemoptysis; No dyspnea  CARDIOVASCULAR: No chest pain, No palpitations  GASTROINTESTINAL: No abd pain, No nausea, No vomiting, No hematemesis, ++ diarrhea, No melena, No hematochezia.  GENITOURINARY: No dysuria, No  hematuria  NEUROLOGICAL: No diplopia, No paresthesia, No motor dysfunction  MUSCULOSKELETAL: No arthralgia, No myalgia  SKIN: No rashes, or lesions   PSYCH: no anxiety, no suicidal ideation    All other review of systems is negative unless indicated above    Vital Signs Last 24 Hrs  T(C): 36.7 (18 Jan 2018 12:01), Max: 36.8 (18 Jan 2018 04:47)  T(F): 98.1 (18 Jan 2018 12:01), Max: 98.2 (18 Jan 2018 04:47)  HR: 76 (18 Jan 2018 12:01) (76 - 98)  BP: 135/56 (18 Jan 2018 12:01) (116/79 - 135/56)  RR: 18 (18 Jan 2018 12:01) (18 - 18)  SpO2: 96% (18 Jan 2018 12:01) (96% - 98%)    PHYSICAL EXAM:    GENERAL: NAD, Well nourished  HEENT:  NC/AT, EOMI, PERRLA, No scleral icterus, Moist mucous membranes  NECK: Supple, No JVD  CNS:  Alert & Oriented X3, Motor Strength 5/5 B/L upper and lower extremities; DTRs 2+ intact   LUNG: Normal Breath sounds, Clear to auscultation bilaterally, No rales, No rhonchi, No wheezing  HEART: RRR; No murmurs, No rubs  ABDOMEN: +BS, ST/ND/NT  GENITOURINARY: Voiding, Bladder not distended  EXTREMITIES:  2+ Peripheral Pulses, No clubbing, No cyanosis, No tibial edema  MUSCULOSKELTAL: Joints normal ROM, No TTP, No effusion  VAGINAL: deferred  SKIN: no rashes  RECTAL: deferred, not indicated  BREAST: deferred               Labs:                        12.9   8.0   )-----------( 260      ( 18 Jan 2018 07:07 )             39.4     01-19    141  |  111<H>  |  27<H>  ----------------------------<  98  3.7   |  23  |  1.36<H>    Ca    8.6      19 Jan 2018 08:39  Phos  1.8     01-19    TPro  x   /  Alb  2.6<L>  /  TBili  x   /  DBili  x   /  AST  x   /  ALT  x   /  AlkPhos  x   01-19           MEDICATIONS  (STANDING):  amLODIPine   Tablet 5 milliGRAM(s) Oral daily  aspirin  chewable 81 milliGRAM(s) Oral daily  cholecalciferol 2000 Unit(s) Oral daily  lactobacillus acidophilus 1 Tablet(s) Oral two times a day with meals  metoprolol     tartrate 50 milliGRAM(s) Oral two times a day  metroNIDAZOLE  IVPB 500 milliGRAM(s) IV Intermittent every 8 hours  predniSONE   Tablet 5 milliGRAM(s) Oral daily  simvastatin 20 milliGRAM(s) Oral at bedtime  sirolimus 0.5 milliGRAM(s) Oral daily  sodium chloride 0.45% 1000 milliLiter(s) (100 mL/Hr) IV Continuous <Continuous>  vancomycin    Solution 125 milliGRAM(s) Oral every 6 hours    MEDICATIONS  (PRN):  acetaminophen   Tablet 650 milliGRAM(s) Oral every 6 hours PRN For Temp greater than 38 C (100.4 F)  acetaminophen   Tablet. 650 milliGRAM(s) Oral every 6 hours PRN Mild Pain (1 - 3)  ondansetron Injectable 4 milliGRAM(s) IV Push every 6 hours PRN Nausea and/or Vomiting      all labs reviewed  all imaging reviewed    Assessment and Plan:    1. Cdiff colitis:  Vancomycin orally, Flagyl IV day#4    2. Acute renal failure: Cr improving to 1.3 today  IV hydration per renal svc  Nephrology evaluation    3. s/p renal transplant:  cw Prednisone/Sirolimus  f/u Sirolimus levels    4. Hypotension: resolved, due to volume depletion   Lopressor decreased    5. Hypokalemia/Hypophosphatemia: replete
Patient is a 67y Female whom presented to the hospital with HTN, SCC of skin multiple sites over body, PCKD w s/p renal transplant 2000, CKD 3 - Cr ~ 1.3  recent MRSA of leg home on doxy and oral vanco fpr + C diff , here with diarrhea.  Patient called presented with complaint of diarrhea and poor intake despite treatments. Renal eval calleld for FREDDIE on CKD     today   no sob or cp   feeling "woozy"- w walking today   still w loose stools - less      PAST MEDICAL & SURGICAL HISTORY:  MVP (mitral valve prolapse)  Raynaud's disease  Squamous cell carcinoma: multiple extensive lesions of torso-all extremities-face/scalp  Hypertension  Hyperlipidemia  Polycystic kidney disease  Fibroid uterus  H/O lumpectomy: left  History of incisional hernia repair  History of kidney transplant: left  History of Mohs surgery for squamous cell carcinoma of skin: recent left arm procedure-multiple Moh&#x27;s procedure in the past      MEDICATIONS  (STANDING):  amLODIPine   Tablet 5 milliGRAM(s) Oral daily  aspirin  chewable 81 milliGRAM(s) Oral daily  cholecalciferol 2000 Unit(s) Oral daily  lactobacillus acidophilus 1 Tablet(s) Oral two times a day with meals  metoprolol     tartrate 50 milliGRAM(s) Oral two times a day  metroNIDAZOLE  IVPB 500 milliGRAM(s) IV Intermittent every 8 hours  potassium chloride  10 mEq/100 mL IVPB 10 milliEquivalent(s) IV Intermittent every 2 hours  predniSONE   Tablet 5 milliGRAM(s) Oral daily  simvastatin 20 milliGRAM(s) Oral at bedtime  sirolimus 0.5 milliGRAM(s) Oral daily  vancomycin    Solution 125 milliGRAM(s) Oral every 6 hours    Allergies    bacitracin (Rash)  Levaquin (Sedation/Somnol; Muscle Pain)  tetracyclines (Other)    Intolerances        SOCIAL HISTORY:  no cigg/etoh    FAMILY HISTORY:  No pertinent family history in first degree relatives      REVIEW OF SYSTEMS:    CONSTITUTIONAL: + weakness, fevers or chills  EYES/ENT: No visual changes;  No vertigo or throat pain   NECK: No pain or stiffness  RESPIRATORY: No cough, wheezing, hemoptysis; No shortness of breath  CARDIOVASCULAR: No chest pain or palpitations  GASTROINTESTINAL: + diarrhea.   GENITOURINARY: No dysuria, frequency or hematuria  NEUROLOGICAL: No numbness or weakness  SKIN: No itching, burning, rashes, or lesions   All other review of systems is negative unless indicated above.    Vital Signs Last 24 Hrs  T(C): 36.7 (18 Jan 2018 12:01), Max: 36.8 (18 Jan 2018 04:47)  T(F): 98.1 (18 Jan 2018 12:01), Max: 98.2 (18 Jan 2018 04:47)  HR: 76 (18 Jan 2018 12:01) (76 - 98)  BP: 135/56 (18 Jan 2018 12:01) (116/79 - 135/56)  BP(mean): --  RR: 18 (18 Jan 2018 12:01) (18 - 18)  SpO2: 96% (18 Jan 2018 12:01) (96% - 98%)      PHYSICAL EXAM:    Constitutional: frail, dry MMM  Neck: No LAD, No JVD  Respiratory: CTAB  Cardiovascular: S1 and S2, RRR  Gastrointestinal: soft, NT  Extremities: No peripheral edema  Neurological: A/O x 3, no focal deficits  Psychiatric: Normal mood, normal affect  : No Manning  Skin: No rashes  Access: Not applicable        LABS:                                                 12.9   8.0   )-----------( 260      ( 18 Jan 2018 07:07 )             39.4     140    |  109    |  32     ----------------------------<  92        18 Jan 2018 07:07  2.9     |  19     |  1.51     139    |  112    |  39     ----------------------------<  71        17 Jan 2018 07:46  3.6     |  16     |  1.63     136    |  108    |  43     ----------------------------<  80        16 Jan 2018 10:26  3.6     |  17     |  2.21     Ca    8.9        18 Jan 2018 07:07  Ca    8.8        17 Jan 2018 07:46    Phos  2.0       18 Jan 2018 07:07  Phos  2.2       17 Jan 2018 07:46    Mg     1.9       17 Jan 2018 07:46      RADIOLOGY & ADDITIONAL STUDIES:
Patient is a 67y Female whom presented to the hospital with HTN, SCC of skin multiple sites over body, PCKD w s/p renal transplant 2000, CKD 3 - Cr ~ 1.3  recent MRSA of leg home on doxy and oral vanco fpr + C diff , here with diarrhea.  Patient called presented with complaint of diarrhea and poor intake despite treatments. Renal eval calleld for FREDDIE on CKD     today   no sob or cp   still w loose stools - but less   tolerating po   no light headeness or dizziness        PAST MEDICAL & SURGICAL HISTORY:  MVP (mitral valve prolapse)  Raynaud's disease  Squamous cell carcinoma: multiple extensive lesions of torso-all extremities-face/scalp  Hypertension  Hyperlipidemia  Polycystic kidney disease  Fibroid uterus  H/O lumpectomy: left  History of incisional hernia repair  History of kidney transplant: left  History of Mohs surgery for squamous cell carcinoma of skin: recent left arm procedure-multiple Moh&#x27;s procedure in the past      MEDICATIONS  (STANDING):  aspirin  chewable 81 milliGRAM(s) Oral daily  cholecalciferol 2000 Unit(s) Oral daily  lactobacillus acidophilus 1 Tablet(s) Oral two times a day with meals  metoprolol     tartrate 50 milliGRAM(s) Oral two times a day  metroNIDAZOLE  IVPB 500 milliGRAM(s) IV Intermittent every 8 hours  potassium chloride    Tablet ER 40 milliEquivalent(s) Oral daily  predniSONE   Tablet 5 milliGRAM(s) Oral daily  simvastatin 20 milliGRAM(s) Oral at bedtime  vancomycin    Solution 125 milliGRAM(s) Oral every 6 hours        Allergies    bacitracin (Rash)  Levaquin (Sedation/Somnol; Muscle Pain)  tetracyclines (Other)    Intolerances        SOCIAL HISTORY:  no cigg/etoh    FAMILY HISTORY:  No pertinent family history in first degree relatives      REVIEW OF SYSTEMS:    CONSTITUTIONAL: + weakness, fevers or chills  EYES/ENT: No visual changes;  No vertigo or throat pain   NECK: No pain or stiffness  RESPIRATORY: No cough, wheezing, hemoptysis; No shortness of breath  CARDIOVASCULAR: No chest pain or palpitations  GASTROINTESTINAL: + diarrhea.   GENITOURINARY: No dysuria, frequency or hematuria  NEUROLOGICAL: No numbness or weakness  SKIN: No itching, burning, rashes, or lesions   All other review of systems is negative unless indicated above.    Vital Signs Last 24 Hrs  T(C): 36.9 (20 Jan 2018 12:27), Max: 37.2 (20 Jan 2018 05:01)  T(F): 98.4 (20 Jan 2018 12:27), Max: 99 (20 Jan 2018 05:01)  HR: 48 (20 Jan 2018 12:27) (48 - 63)  BP: 143/57 (20 Jan 2018 12:27) (122/72 - 143/57)  BP(mean): --  RR: 18 (20 Jan 2018 12:27) (17 - 18)  SpO2: 100% (20 Jan 2018 12:27) (98% - 100%)    PHYSICAL EXAM:    Constitutional: frail, dry MMM  Neck: No LAD, No JVD  Respiratory: CTAB  Cardiovascular: S1 and S2, RRR  Gastrointestinal: soft, NT  Extremities: No peripheral edema  Neurological: A/O x 3, no focal deficits  Psychiatric: Normal mood, normal affect  : No Manning  Skin: No rashes  Access: Not applicable        LABS:                     141    |  113    |  26     ----------------------------<  107       20 Jan 2018 07:30  3.7     |  19     |  1.36     141    |  111    |  27     ----------------------------<  98        19 Jan 2018 08:39  3.7     |  23     |  1.36     140    |  109    |  32     ----------------------------<  92        18 Jan 2018 07:07  2.9     |  19     |  1.51     Ca    7.9        20 Jan 2018 07:30  Ca    8.6        19 Jan 2018 08:39    Phos  2.5       20 Jan 2018 07:30  Phos  1.8       19 Jan 2018 08:39        RADIOLOGY & ADDITIONAL STUDIES:
Patient is a 67y old  Female who presents with a chief complaint of diarrhea (16 Jan 2018 13:25)      HPI:  67F w/ PMH HTN, SCC of skin multiple over body, PCKD w/p renal transplant 2000, recent MRSA cellulitis RT leg discharged on PO Doxycycline presented with diarrhea >10times a day. Patient was on doxy when 5 days ago started having diarrhea, outpatient specimen was positive for cdiff. She states she was trying to drink enough PO fluids, but was unable to keep up with intake. Here, afebrile, wbc ct nl, was given PO vancomycin/IV flagyl. This is pts second episode of c diff.     about 5 loose bms yesterday, 1 today  eating more  no fevers      MEDICATIONS  (STANDING):  amLODIPine   Tablet 5 milliGRAM(s) Oral daily  aspirin  chewable 81 milliGRAM(s) Oral daily  cholecalciferol 2000 Unit(s) Oral daily  lactobacillus acidophilus 1 Tablet(s) Oral two times a day with meals  metoprolol     tartrate 50 milliGRAM(s) Oral two times a day  metroNIDAZOLE  IVPB 500 milliGRAM(s) IV Intermittent every 8 hours  potassium chloride  10 mEq/100 mL IVPB 10 milliEquivalent(s) IV Intermittent every 2 hours  predniSONE   Tablet 5 milliGRAM(s) Oral daily  simvastatin 20 milliGRAM(s) Oral at bedtime  sirolimus 0.5 milliGRAM(s) Oral daily  vancomycin    Solution 125 milliGRAM(s) Oral every 6 hours      Vital Signs Last 24 Hrs  T(C): 36.7 (18 Jan 2018 12:01), Max: 36.8 (18 Jan 2018 04:47)  T(F): 98.1 (18 Jan 2018 12:01), Max: 98.2 (18 Jan 2018 04:47)  HR: 76 (18 Jan 2018 12:01) (76 - 98)  BP: 135/56 (18 Jan 2018 12:01) (116/79 - 135/56)  BP(mean): --  RR: 18 (18 Jan 2018 12:01) (18 - 18)  SpO2: 96% (18 Jan 2018 12:01) (96% - 98%)          PE:  Constitutional: frail looking  HEENT: NC/AT, EOMI, PERRLA  Neck: supple  Back: no tenderness  Respiratory: decreased breath sounds  Cardiovascular: S1S2 regular, no murmurs  Abdomen: soft, not tender, not distended, positive BS  Genitourinary: deferred  Rectal: deferred  Musculoskeletal: no muscle tenderness, no joint swelling or tenderness  Extremities: no pedal edema  Neurological: no focal deficits  Skin: no rashes    Labs:                                   12.9   8.0   )-----------( 260      ( 18 Jan 2018 07:07 )             39.4     01-18    140  |  109<H>  |  32<H>  ----------------------------<  92  2.9<LL>   |  19<L>  |  1.51<H>    Ca    8.9      18 Jan 2018 07:07  Phos  2.0     01-18  Mg     1.9     01-17    TPro  x   /  Alb  2.8<L>  /  TBili  x   /  DBili  x   /  AST  x   /  ALT  x   /  AlkPhos  x   01-18           Culture - Stool (01.17.18 @ 07:30)    Specimen Source: .Stool Feces    Culture Results:   No enteric pathogens to date: Final culture pending    Ova and Parasites (01.16.18 @ 17:23)    Culture Results:   Testing in progress    Culture - Blood (01.05.18 @ 17:08)    Specimen Source: .Blood Blood-Peripheral    Culture Results:   No growth at 5 days.        Radiology:  < from: Xray Tibia + Fibula 2 Views, Right (01.05.18 @ 21:31) >    EXAM:  CR TIB-FIB RIGHT                            PROCEDURE DATE:  01/05/2018          INTERPRETATION:  CR TIB-FIB RIGHT    HISTORY: eval for subcutaneous air, MRSA infection of the right leg    TECHNIQUE: AP and lateral radiographs of the right tibia-fibula;        COMPARISON: None     FINDINGS:       The cortex is intact. There is no evidence of displaced fracture.    The visualized knee and ankle joints are intact, no dislocation.    No localized soft tissue swelling. No subcutaneous air.    IMPRESSION:    No subcutaneous air.    < from: Xray Abdomen 2 Views (01.16.18 @ 11:18) >    EXAM:  XR ABDOMEN 2V                            PROCEDURE DATE:  01/16/2018          INTERPRETATION:  CLINICAL STATEMENT: C. difficile evaluate megacolon    TECHNIQUE: Supine and upright views of the abdomen.    COMPARISON: None.    FINDINGS: Thereis a nonobstructive bowel gas pattern with air throughout   the colon. There is no colonic dilatation to suggest megacolon. There is   no evidence of free intraperitoneal air.    3 seen in the pelvis. Osseous structures are unremarkable.    IMPRESSION:    Nonobstructive bowel gas pattern. No plain film evidence of megacolon          Advanced directives addressed: full resuscitation
Patient is a 67y Female whom presented to the hospital with HTN, SCC of skin multiple sites over body, PCKD w s/p renal transplant 2000, CKD 3 - Cr ~ 1.3  recent MRSA of leg home on doxy and oral vanco fpr + C diff , here with diarrhea.  Patient called presented with complaint of diarrhea and poor intake despite treatments. Renal eval calleld for FREDDIE on CKD     today feeling better   no sob or cp   feeling "woozy"- after bp meds   /45 this AM       PAST MEDICAL & SURGICAL HISTORY:  MVP (mitral valve prolapse)  Raynaud's disease  Squamous cell carcinoma: multiple extensive lesions of torso-all extremities-face/scalp  Hypertension  Hyperlipidemia  Polycystic kidney disease  Fibroid uterus  H/O lumpectomy: left  History of incisional hernia repair  History of kidney transplant: left  History of Mohs surgery for squamous cell carcinoma of skin: recent left arm procedure-multiple Moh&#x27;s procedure in the past      MEDICATIONS  (STANDING):  amLODIPine   Tablet 5 milliGRAM(s) Oral daily  aspirin  chewable 81 milliGRAM(s) Oral daily  cholecalciferol 2000 Unit(s) Oral daily  lactobacillus acidophilus 1 Tablet(s) Oral two times a day with meals  metoprolol     tartrate 50 milliGRAM(s) Oral two times a day  metroNIDAZOLE  IVPB 500 milliGRAM(s) IV Intermittent every 8 hours  predniSONE   Tablet 5 milliGRAM(s) Oral daily  simvastatin 20 milliGRAM(s) Oral at bedtime  sirolimus 0.5 milliGRAM(s) Oral daily  sodium chloride 0.45% 1000 milliLiter(s) (100 mL/Hr) IV Continuous <Continuous>  vancomycin    Solution 125 milliGRAM(s) Oral every 6 hours      Allergies    bacitracin (Rash)  Levaquin (Sedation/Somnol; Muscle Pain)  tetracyclines (Other)    Intolerances        SOCIAL HISTORY:  no cigg/etoh    FAMILY HISTORY:  No pertinent family history in first degree relatives      REVIEW OF SYSTEMS:    CONSTITUTIONAL: + weakness, fevers or chills  EYES/ENT: No visual changes;  No vertigo or throat pain   NECK: No pain or stiffness  RESPIRATORY: No cough, wheezing, hemoptysis; No shortness of breath  CARDIOVASCULAR: No chest pain or palpitations  GASTROINTESTINAL: + diarrhea.   GENITOURINARY: No dysuria, frequency or hematuria  NEUROLOGICAL: No numbness or weakness  SKIN: No itching, burning, rashes, or lesions   All other review of systems is negative unless indicated above.    Vital Signs Last 24 Hrs  T(C): 36.6 (17 Jan 2018 05:15), Max: 36.8 (16 Jan 2018 19:30)  T(F): 97.9 (17 Jan 2018 05:15), Max: 98.2 (16 Jan 2018 19:30)  HR: 60 (17 Jan 2018 05:15) (60 - 86)  BP: 106/45 (17 Jan 2018 05:15) (106/45 - 140/48)  BP(mean): --  RR: 18 (17 Jan 2018 05:15) (15 - 18)  SpO2: 100% (17 Jan 2018 05:15) (98% - 100%)        PHYSICAL EXAM:    Constitutional: frail, dry MMM  Neck: No LAD, No JVD  Respiratory: CTAB  Cardiovascular: S1 and S2, RRR  Gastrointestinal: soft, NT  Extremities: No peripheral edema  Neurological: A/O x 3, no focal deficits  Psychiatric: Normal mood, normal affect  : No Manning  Skin: No rashes  Access: Not applicable        LABS:                              14.5   9.2   )-----------( 303      ( 16 Jan 2018 10:26 )             45.6     139    |  112    |  39     ----------------------------<  71        17 Jan 2018 07:46  3.6     |  16     |  1.63     136    |  108    |  43     ----------------------------<  80        16 Jan 2018 10:26  3.6     |  17     |  2.21     Ca    8.8        17 Jan 2018 07:46  Ca    9.7        16 Jan 2018 10:26    Phos  2.2       17 Jan 2018 07:46    Mg     1.9       17 Jan 2018 07:46    TPro  6.1    /  Alb  2.7    /  TBili  0.5    /        17 Jan 2018 07:46  DBili  x      /  AST  24     /  ALT  20     /  AlkPhos  59       TPro  7.4    /  Alb  3.1    /  TBili  0.5    /        16 Jan 2018 10:26  DBili  x      /  AST  28     /  ALT  27     /  AlkPhos  67           Urine Studies:          RADIOLOGY & ADDITIONAL STUDIES:

## 2018-01-21 ENCOUNTER — TRANSCRIPTION ENCOUNTER (OUTPATIENT)
Age: 68
End: 2018-01-21

## 2018-01-21 VITALS
RESPIRATION RATE: 18 BRPM | HEART RATE: 84 BPM | TEMPERATURE: 98 F | SYSTOLIC BLOOD PRESSURE: 145 MMHG | OXYGEN SATURATION: 100 % | DIASTOLIC BLOOD PRESSURE: 62 MMHG

## 2018-01-21 LAB
ALBUMIN SERPL ELPH-MCNC: 2.5 G/DL — LOW (ref 3.3–5)
ANION GAP SERPL CALC-SCNC: 8 MMOL/L — SIGNIFICANT CHANGE UP (ref 5–17)
BUN SERPL-MCNC: 25 MG/DL — HIGH (ref 7–23)
CALCIUM SERPL-MCNC: 8.7 MG/DL — SIGNIFICANT CHANGE UP (ref 8.5–10.1)
CHLORIDE SERPL-SCNC: 111 MMOL/L — HIGH (ref 96–108)
CO2 SERPL-SCNC: 23 MMOL/L — SIGNIFICANT CHANGE UP (ref 22–31)
CREAT SERPL-MCNC: 1.35 MG/DL — HIGH (ref 0.5–1.3)
GLUCOSE SERPL-MCNC: 97 MG/DL — SIGNIFICANT CHANGE UP (ref 70–99)
HCT VFR BLD CALC: 39.3 % — SIGNIFICANT CHANGE UP (ref 34.5–45)
HGB BLD-MCNC: 12.5 G/DL — SIGNIFICANT CHANGE UP (ref 11.5–15.5)
MAGNESIUM SERPL-MCNC: 1.8 MG/DL — SIGNIFICANT CHANGE UP (ref 1.6–2.6)
MCHC RBC-ENTMCNC: 28.4 PG — SIGNIFICANT CHANGE UP (ref 27–34)
MCHC RBC-ENTMCNC: 31.8 GM/DL — LOW (ref 32–36)
MCV RBC AUTO: 89.6 FL — SIGNIFICANT CHANGE UP (ref 80–100)
PHOSPHATE SERPL-MCNC: 2.4 MG/DL — LOW (ref 2.5–4.5)
PLATELET # BLD AUTO: 170 K/UL — SIGNIFICANT CHANGE UP (ref 150–400)
POTASSIUM SERPL-MCNC: 4 MMOL/L — SIGNIFICANT CHANGE UP (ref 3.5–5.3)
POTASSIUM SERPL-SCNC: 4 MMOL/L — SIGNIFICANT CHANGE UP (ref 3.5–5.3)
RBC # BLD: 4.39 M/UL — SIGNIFICANT CHANGE UP (ref 3.8–5.2)
RBC # FLD: 14.6 % — HIGH (ref 10.3–14.5)
SODIUM SERPL-SCNC: 142 MMOL/L — SIGNIFICANT CHANGE UP (ref 135–145)
WBC # BLD: 5.1 K/UL — SIGNIFICANT CHANGE UP (ref 3.8–10.5)
WBC # FLD AUTO: 5.1 K/UL — SIGNIFICANT CHANGE UP (ref 3.8–10.5)

## 2018-01-21 RX ORDER — SIROLIMUS 1 MG/ML
1 SOLUTION ORAL
Qty: 0 | Refills: 0 | COMMUNITY

## 2018-01-21 RX ORDER — LOSARTAN POTASSIUM 100 MG/1
1 TABLET, FILM COATED ORAL
Qty: 0 | Refills: 0 | COMMUNITY

## 2018-01-21 RX ORDER — SIROLIMUS 1 MG/ML
1 SOLUTION ORAL
Qty: 0 | Refills: 0 | COMMUNITY
Start: 2018-01-21

## 2018-01-21 RX ORDER — AMLODIPINE BESYLATE 2.5 MG/1
1 TABLET ORAL
Qty: 0 | Refills: 0 | COMMUNITY

## 2018-01-21 RX ORDER — METOPROLOL TARTRATE 50 MG
1 TABLET ORAL
Qty: 60 | Refills: 0 | OUTPATIENT
Start: 2018-01-21 | End: 2018-02-19

## 2018-01-21 RX ADMIN — Medication 1 TABLET(S): at 08:14

## 2018-01-21 RX ADMIN — Medication 125 MILLIGRAM(S): at 11:11

## 2018-01-21 RX ADMIN — Medication 40 MILLIEQUIVALENT(S): at 11:10

## 2018-01-21 RX ADMIN — Medication 5 MILLIGRAM(S): at 06:38

## 2018-01-21 RX ADMIN — Medication 100 MILLIGRAM(S): at 06:38

## 2018-01-21 RX ADMIN — Medication 50 MILLIGRAM(S): at 06:38

## 2018-01-21 RX ADMIN — Medication 125 MILLIGRAM(S): at 06:39

## 2018-01-21 RX ADMIN — Medication 2000 UNIT(S): at 11:09

## 2018-01-21 RX ADMIN — Medication 81 MILLIGRAM(S): at 11:09

## 2018-01-21 NOTE — DISCHARGE NOTE ADULT - MEDICATION SUMMARY - MEDICATIONS TO TAKE
I will START or STAY ON the medications listed below when I get home from the hospital:    predniSONE 5 mg oral tablet  -- 1 tab(s) by mouth once a day  -- Indication: For transplant    aspirin 81 mg oral tablet  -- 1 tab(s) by mouth once a day  -- Indication: For cardiac    simvastatin 20 mg oral tablet  -- 1 tab(s) by mouth once a day (at bedtime)  -- Indication: For cholesterol    metoprolol tartrate 50 mg oral tablet  -- 1 tab(s) by mouth 2 times a day  -- Indication: For HTN    vancomycin 125 mg oral capsule  -- 1 cap(s) by mouth every 6 hours for 10 more days  -- Indication: For cdiff    sirolimus 0.5 mg oral tablet  -- 1 tab(s) by mouth once a day  -- Indication: For transplant    Acidophilus oral capsule  -- 1 tab(s) by mouth 2 times a day  -- Indication: For probiotic    Estrace 1 mg oral tablet  -- orally once a week  -- Indication: For replacement    cholecalciferol oral tablet  -- 2000 unit(s) by mouth once a day  -- Indication: For vitamin

## 2018-01-21 NOTE — DISCHARGE NOTE ADULT - VISION (WITH CORRECTIVE LENSES IF THE PATIENT USUALLY WEARS THEM):
Partially impaired: cannot see medication labels or newsprint, but can see obstacles in path, and the surrounding layout; can count fingers at arm's length/for distance/reading

## 2018-01-21 NOTE — DISCHARGE NOTE ADULT - MEDICATION SUMMARY - MEDICATIONS TO STOP TAKING
I will STOP taking the medications listed below when I get home from the hospital:    losartan 100 mg oral tablet  -- 1 tab(s) by mouth once a day    Norvasc 5 mg oral tablet  -- 1 tab(s) by mouth once a day

## 2018-01-21 NOTE — DISCHARGE NOTE ADULT - CARE PLAN
Principal Discharge DX:	Diarrhea  Goal:	complete oral vancomycin  Assessment and plan of treatment:	outpatient f/u

## 2018-01-21 NOTE — DISCHARGE NOTE ADULT - HOSPITAL COURSE
PCP- DR Bazan  CC-Patient is a 67y old  Female who presents with a chief complaint of diarrhea (16 Jan 2018 13:25)  HPI:  67F w/ PMH HTN, SCC of skin multiple over body, PCKD w/p renal transplant 2000, recent MRSA cellulitis RT leg discharged on PO Doxycycline presented with diarrhea >10times a day. Patient was on doxy when 5 days ago started having diarrhea, outpatient specimen was positive for cdiff. She states she was trying to drink enough PO fluids, but was unable to keep up with intake. (16 Jan 2018 13:25)  Hospital stay- was on IVF and PO Vanco+IV flagyl. Renal function back to baseline. Diarrhea improving  Review of system- All 10 systems reviewed and is as per HPI otherwise negative.   T(C): 36.7 (01-21-18 @ 06:48), Max: 36.9 (01-20-18 @ 12:27)  HR: 84 (01-21-18 @ 06:48) (48 - 84)  BP: 145/62 (01-21-18 @ 06:48) (116/59 - 145/62)  RR: 18 (01-21-18 @ 06:48) (18 - 18)  SpO2: 100% (01-21-18 @ 06:48) (100% - 100%)  Wt(kg): --  LABS:                        12.5   5.1   )-----------( 170      ( 21 Jan 2018 08:10 )             39.3     01-21    142  |  111<H>  |  25<H>  ----------------------------<  97  4.0   |  23  |  1.35<H>    Ca    8.7      21 Jan 2018 08:10  Phos  2.4     01-21  Mg     1.8     01-21  TPro  x   /  Alb  2.5<L>  /  TBili  x   /  DBili  x   /  AST  x   /  ALT  x   /  AlkPhos  x   01-21    PHYSICAL EXAM:  GENERAL: NAD, well-groomed, well-developed  HEAD:  Atraumatic, Normocephalic  EYES: EOMI, PERRLA, conjunctiva and sclera clear  HEENT: Moist mucous membranes  NECK: Supple, No JVD  NERVOUS SYSTEM:  Alert & Oriented X3, Motor Strength 5/5 B/L upper and lower extremities; DTRs 2+ intact and symmetric  CHEST/LUNG: Clear to auscultation bilaterally; No rales, rhonchi, wheezing, or rubs  HEART: Regular rate and rhythm; No murmurs, rubs, or gallops  ABDOMEN: Soft, Nontender, Nondistended; Bowel sounds present  GENITOURINARY- Voiding, no palpable bladder  EXTREMITIES:  2+ Peripheral Pulses, No clubbing, cyanosis, or edema  MUSCULOSKELTAL- No muscle tenderness, Muscle tone normal, No joint tenderness, no Joint swelling, Joint range of motion-normal  SKIN-no rash, no lesion  CNS- alert, oriented X3, non focal     Assessment/Plan  #Cdiff- complete course of PO Vanco  #ARF- resolved  #CKD stage 3/s/p renal transplant- outpatient f/u with DR Aguilar  #HTN- stable  #Dispo- home today

## 2018-01-21 NOTE — DISCHARGE NOTE ADULT - CARE PROVIDERS DIRECT ADDRESSES
[email or web address]

## 2018-01-21 NOTE — DISCHARGE NOTE ADULT - CARE PROVIDER_API CALL
Coreen Bazan), Internal Medicine  [address] [place] [address]  Phone: [phone]  Fax: [phone]    Esvin Aguilar), Internal Medicine  [address] [place] [address]  Phone: [phone]  Fax: [phone]

## 2018-01-21 NOTE — DISCHARGE NOTE ADULT - PATIENT PORTAL LINK FT
“You can access the [facility] Patient Portal, offered by [facility], by registering with the following website: [email or web address]”

## 2018-01-26 DIAGNOSIS — Z88.1 ALLERGY STATUS TO OTHER ANTIBIOTIC AGENTS STATUS: ICD-10-CM

## 2018-01-26 DIAGNOSIS — A04.71 ENTEROCOLITIS DUE TO CLOSTRIDIUM DIFFICILE, RECURRENT: ICD-10-CM

## 2018-01-26 DIAGNOSIS — C44.92 SQUAMOUS CELL CARCINOMA OF SKIN, UNSPECIFIED: ICD-10-CM

## 2018-01-26 DIAGNOSIS — D89.9 DISORDER INVOLVING THE IMMUNE MECHANISM, UNSPECIFIED: ICD-10-CM

## 2018-01-26 DIAGNOSIS — I95.9 HYPOTENSION, UNSPECIFIED: ICD-10-CM

## 2018-01-26 DIAGNOSIS — Z87.448 PERSONAL HISTORY OF OTHER DISEASES OF URINARY SYSTEM: ICD-10-CM

## 2018-01-26 DIAGNOSIS — Z92.3 PERSONAL HISTORY OF IRRADIATION: ICD-10-CM

## 2018-01-26 DIAGNOSIS — E43 UNSPECIFIED SEVERE PROTEIN-CALORIE MALNUTRITION: ICD-10-CM

## 2018-01-26 DIAGNOSIS — Z94.0 KIDNEY TRANSPLANT STATUS: ICD-10-CM

## 2018-01-26 DIAGNOSIS — I12.9 HYPERTENSIVE CHRONIC KIDNEY DISEASE WITH STAGE 1 THROUGH STAGE 4 CHRONIC KIDNEY DISEASE, OR UNSPECIFIED CHRONIC KIDNEY DISEASE: ICD-10-CM

## 2018-01-26 DIAGNOSIS — E83.39 OTHER DISORDERS OF PHOSPHORUS METABOLISM: ICD-10-CM

## 2018-01-26 DIAGNOSIS — E78.5 HYPERLIPIDEMIA, UNSPECIFIED: ICD-10-CM

## 2018-01-26 DIAGNOSIS — E87.2 ACIDOSIS: ICD-10-CM

## 2018-01-26 DIAGNOSIS — E87.6 HYPOKALEMIA: ICD-10-CM

## 2018-01-26 DIAGNOSIS — N17.9 ACUTE KIDNEY FAILURE, UNSPECIFIED: ICD-10-CM

## 2018-01-26 DIAGNOSIS — Z79.82 LONG TERM (CURRENT) USE OF ASPIRIN: ICD-10-CM

## 2018-01-26 DIAGNOSIS — Z79.52 LONG TERM (CURRENT) USE OF SYSTEMIC STEROIDS: ICD-10-CM

## 2018-01-26 DIAGNOSIS — N18.3 CHRONIC KIDNEY DISEASE, STAGE 3 (MODERATE): ICD-10-CM

## 2018-01-26 DIAGNOSIS — Z86.14 PERSONAL HISTORY OF METHICILLIN RESISTANT STAPHYLOCOCCUS AUREUS INFECTION: ICD-10-CM

## 2018-03-29 PROBLEM — Z00.00 ENCOUNTER FOR PREVENTIVE HEALTH EXAMINATION: Status: ACTIVE | Noted: 2018-03-29

## 2018-03-30 ENCOUNTER — TRANSCRIPTION ENCOUNTER (OUTPATIENT)
Age: 68
End: 2018-03-30

## 2018-03-30 ENCOUNTER — APPOINTMENT (OUTPATIENT)
Dept: OTOLARYNGOLOGY | Facility: CLINIC | Age: 68
End: 2018-03-30
Payer: MEDICARE

## 2018-03-30 VITALS
SYSTOLIC BLOOD PRESSURE: 134 MMHG | HEART RATE: 64 BPM | WEIGHT: 130 LBS | DIASTOLIC BLOOD PRESSURE: 75 MMHG | BODY MASS INDEX: 20.89 KG/M2 | HEIGHT: 66 IN

## 2018-03-30 DIAGNOSIS — H92.02 OTALGIA, LEFT EAR: ICD-10-CM

## 2018-03-30 DIAGNOSIS — H90.3 SENSORINEURAL HEARING LOSS, BILATERAL: ICD-10-CM

## 2018-03-30 DIAGNOSIS — R22.1 LOCALIZED SWELLING, MASS AND LUMP, NECK: ICD-10-CM

## 2018-03-30 DIAGNOSIS — H61.22 IMPACTED CERUMEN, LEFT EAR: ICD-10-CM

## 2018-03-30 PROCEDURE — 99202 OFFICE O/P NEW SF 15 MIN: CPT

## 2018-03-30 RX ORDER — SIMVASTATIN 20 MG/1
20 TABLET, FILM COATED ORAL
Qty: 90 | Refills: 0 | Status: ACTIVE | COMMUNITY
Start: 2017-11-08

## 2018-03-30 RX ORDER — PREDNISONE 5 MG/1
5 TABLET ORAL
Qty: 90 | Refills: 0 | Status: ACTIVE | COMMUNITY
Start: 2017-10-20

## 2018-03-30 RX ORDER — LOSARTAN POTASSIUM 100 MG/1
100 TABLET, FILM COATED ORAL
Qty: 90 | Refills: 0 | Status: ACTIVE | COMMUNITY
Start: 2017-12-29

## 2018-03-30 RX ORDER — VANCOMYCIN HYDROCHLORIDE 125 MG/1
125 CAPSULE ORAL
Qty: 20 | Refills: 0 | Status: ACTIVE | COMMUNITY
Start: 2018-01-21

## 2018-03-30 RX ORDER — METOPROLOL TARTRATE 25 MG/1
25 TABLET, FILM COATED ORAL
Qty: 60 | Refills: 0 | Status: ACTIVE | COMMUNITY
Start: 2018-03-28

## 2018-03-30 RX ORDER — METOPROLOL TARTRATE 100 MG/1
100 TABLET, FILM COATED ORAL
Qty: 180 | Refills: 0 | Status: ACTIVE | COMMUNITY
Start: 2017-11-03

## 2018-03-30 RX ORDER — OXYCODONE 5 MG/1
5 TABLET ORAL
Qty: 30 | Refills: 0 | Status: ACTIVE | COMMUNITY
Start: 2018-03-16

## 2018-03-30 RX ORDER — DOXYCYCLINE HYCLATE 100 MG/1
100 TABLET ORAL
Qty: 28 | Refills: 0 | Status: ACTIVE | COMMUNITY
Start: 2018-01-03

## 2018-03-30 RX ORDER — DOXYCYCLINE HYCLATE 100 MG/1
100 CAPSULE ORAL
Qty: 20 | Refills: 0 | Status: ACTIVE | COMMUNITY
Start: 2017-12-08

## 2018-03-30 RX ORDER — MUPIROCIN 20 MG/G
2 OINTMENT TOPICAL
Qty: 44 | Refills: 0 | Status: ACTIVE | COMMUNITY
Start: 2018-01-03

## 2018-03-30 RX ORDER — SILVER SULFADIAZINE 10 MG/G
1 CREAM TOPICAL
Qty: 85 | Refills: 0 | Status: ACTIVE | COMMUNITY
Start: 2017-11-27

## 2018-03-30 RX ORDER — CIPROFLOXACIN HYDROCHLORIDE 250 MG/1
250 TABLET, FILM COATED ORAL
Qty: 6 | Refills: 0 | Status: ACTIVE | COMMUNITY
Start: 2018-03-26

## 2018-03-30 RX ORDER — LIDOCAINE 5 G/100G
5 OINTMENT TOPICAL
Qty: 35 | Refills: 0 | Status: ACTIVE | COMMUNITY
Start: 2017-11-24

## 2018-03-30 RX ORDER — AMLODIPINE BESYLATE 5 MG/1
5 TABLET ORAL
Qty: 90 | Refills: 0 | Status: ACTIVE | COMMUNITY
Start: 2017-12-29

## 2018-03-30 RX ORDER — METOPROLOL TARTRATE 50 MG/1
50 TABLET, FILM COATED ORAL
Qty: 180 | Refills: 0 | Status: ACTIVE | COMMUNITY
Start: 2018-03-19

## 2018-03-30 RX ORDER — SIROLIMUS 0.5 MG/1
0.5 TABLET, FILM COATED ORAL
Qty: 30 | Refills: 0 | Status: ACTIVE | COMMUNITY
Start: 2017-06-23

## 2018-03-30 RX ORDER — BENZONATATE 100 MG/1
100 CAPSULE ORAL
Qty: 30 | Refills: 0 | Status: ACTIVE | COMMUNITY
Start: 2017-12-08

## 2018-05-04 ENCOUNTER — INPATIENT (INPATIENT)
Facility: HOSPITAL | Age: 68
LOS: 6 days | Discharge: HOME HEALTH | End: 2018-05-11
Attending: FAMILY MEDICINE | Admitting: FAMILY MEDICINE
Payer: MEDICARE

## 2018-05-04 VITALS — WEIGHT: 110.01 LBS | HEIGHT: 62 IN

## 2018-05-04 DIAGNOSIS — Z94.0 KIDNEY TRANSPLANT STATUS: Chronic | ICD-10-CM

## 2018-05-04 DIAGNOSIS — Z85.828 PERSONAL HISTORY OF OTHER MALIGNANT NEOPLASM OF SKIN: Chronic | ICD-10-CM

## 2018-05-04 DIAGNOSIS — Z98.89 OTHER SPECIFIED POSTPROCEDURAL STATES: Chronic | ICD-10-CM

## 2018-05-04 DIAGNOSIS — D25.9 LEIOMYOMA OF UTERUS, UNSPECIFIED: Chronic | ICD-10-CM

## 2018-05-04 LAB
ALBUMIN SERPL ELPH-MCNC: 3 G/DL — LOW (ref 3.3–5)
ALP SERPL-CCNC: 76 U/L — SIGNIFICANT CHANGE UP (ref 40–120)
ALT FLD-CCNC: 15 U/L — SIGNIFICANT CHANGE UP (ref 12–78)
ANION GAP SERPL CALC-SCNC: 11 MMOL/L — SIGNIFICANT CHANGE UP (ref 5–17)
APPEARANCE UR: CLEAR — SIGNIFICANT CHANGE UP
APTT BLD: 25.8 SEC — LOW (ref 27.5–37.4)
AST SERPL-CCNC: 20 U/L — SIGNIFICANT CHANGE UP (ref 15–37)
BACTERIA # UR AUTO: (no result)
BASOPHILS # BLD AUTO: SIGNIFICANT CHANGE UP (ref 0–0.2)
BASOPHILS NFR BLD AUTO: SIGNIFICANT CHANGE UP % (ref 0–2)
BILIRUB SERPL-MCNC: 0.8 MG/DL — SIGNIFICANT CHANGE UP (ref 0.2–1.2)
BILIRUB UR-MCNC: NEGATIVE — SIGNIFICANT CHANGE UP
BUN SERPL-MCNC: 46 MG/DL — HIGH (ref 7–23)
CALCIUM SERPL-MCNC: 8.9 MG/DL — SIGNIFICANT CHANGE UP (ref 8.5–10.1)
CHLORIDE SERPL-SCNC: 105 MMOL/L — SIGNIFICANT CHANGE UP (ref 96–108)
CO2 SERPL-SCNC: 21 MMOL/L — LOW (ref 22–31)
COLOR SPEC: YELLOW — SIGNIFICANT CHANGE UP
COMMENT - URINE: SIGNIFICANT CHANGE UP
CREAT SERPL-MCNC: 1.45 MG/DL — HIGH (ref 0.5–1.3)
DIFF PNL FLD: (no result)
EOSINOPHIL # BLD AUTO: SIGNIFICANT CHANGE UP (ref 0–0.5)
EOSINOPHIL NFR BLD AUTO: SIGNIFICANT CHANGE UP % (ref 0–6)
EPI CELLS # UR: (no result)
GLUCOSE SERPL-MCNC: 100 MG/DL — HIGH (ref 70–99)
GLUCOSE UR QL: NEGATIVE MG/DL — SIGNIFICANT CHANGE UP
HCT VFR BLD CALC: 35.5 % — SIGNIFICANT CHANGE UP (ref 34.5–45)
HGB BLD-MCNC: 11.7 G/DL — SIGNIFICANT CHANGE UP (ref 11.5–15.5)
IMM GRANULOCYTES NFR BLD AUTO: SIGNIFICANT CHANGE UP % (ref 0–1.5)
INR BLD: 0.96 RATIO — SIGNIFICANT CHANGE UP (ref 0.88–1.16)
KETONES UR-MCNC: NEGATIVE — SIGNIFICANT CHANGE UP
LACTATE SERPL-SCNC: 0.7 MMOL/L — SIGNIFICANT CHANGE UP (ref 0.7–2)
LEUKOCYTE ESTERASE UR-ACNC: (no result)
LYMPHOCYTES # BLD AUTO: SIGNIFICANT CHANGE UP % (ref 13–44)
LYMPHOCYTES # BLD AUTO: SIGNIFICANT CHANGE UP (ref 1–3.3)
MANUAL DIF COMMENT BLD-IMP: SIGNIFICANT CHANGE UP
MCHC RBC-ENTMCNC: 31.1 PG — SIGNIFICANT CHANGE UP (ref 27–34)
MCHC RBC-ENTMCNC: 33 GM/DL — SIGNIFICANT CHANGE UP (ref 32–36)
MCV RBC AUTO: 94.4 FL — SIGNIFICANT CHANGE UP (ref 80–100)
MONOCYTES # BLD AUTO: SIGNIFICANT CHANGE UP (ref 0–0.9)
MONOCYTES NFR BLD AUTO: SIGNIFICANT CHANGE UP % (ref 2–14)
NEUTROPHILS # BLD AUTO: SIGNIFICANT CHANGE UP K/UL (ref 1.8–7.4)
NEUTROPHILS NFR BLD AUTO: SIGNIFICANT CHANGE UP % (ref 43–77)
NITRITE UR-MCNC: NEGATIVE — SIGNIFICANT CHANGE UP
PH UR: 6 — SIGNIFICANT CHANGE UP (ref 5–8)
PLAT MORPH BLD: NORMAL — SIGNIFICANT CHANGE UP
PLATELET # BLD AUTO: 50 K/UL — LOW (ref 150–400)
POTASSIUM SERPL-MCNC: 5.1 MMOL/L — SIGNIFICANT CHANGE UP (ref 3.5–5.3)
POTASSIUM SERPL-SCNC: 5.1 MMOL/L — SIGNIFICANT CHANGE UP (ref 3.5–5.3)
PROT SERPL-MCNC: 6.5 GM/DL — SIGNIFICANT CHANGE UP (ref 6–8.3)
PROT UR-MCNC: 100 MG/DL
PROTHROM AB SERPL-ACNC: 10.4 SEC — SIGNIFICANT CHANGE UP (ref 9.8–12.7)
RBC # BLD: 3.76 M/UL — LOW (ref 3.8–5.2)
RBC # FLD: 16.2 % — HIGH (ref 10.3–14.5)
RBC BLD AUTO: SIGNIFICANT CHANGE UP
RBC CASTS # UR COMP ASSIST: (no result) /HPF (ref 0–4)
SODIUM SERPL-SCNC: 137 MMOL/L — SIGNIFICANT CHANGE UP (ref 135–145)
SP GR SPEC: 1.01 — SIGNIFICANT CHANGE UP (ref 1.01–1.02)
UROBILINOGEN FLD QL: NEGATIVE MG/DL — SIGNIFICANT CHANGE UP
WBC # BLD: 0.25 K/UL — CRITICAL LOW (ref 3.8–10.5)
WBC # FLD AUTO: 0.25 K/UL — CRITICAL LOW (ref 3.8–10.5)
WBC UR QL: (no result)

## 2018-05-04 PROCEDURE — 71045 X-RAY EXAM CHEST 1 VIEW: CPT | Mod: 26

## 2018-05-04 PROCEDURE — 93010 ELECTROCARDIOGRAM REPORT: CPT

## 2018-05-04 PROCEDURE — 93970 EXTREMITY STUDY: CPT | Mod: 26

## 2018-05-04 PROCEDURE — 99285 EMERGENCY DEPT VISIT HI MDM: CPT

## 2018-05-04 RX ORDER — SODIUM CHLORIDE 9 MG/ML
1500 INJECTION INTRAMUSCULAR; INTRAVENOUS; SUBCUTANEOUS ONCE
Qty: 0 | Refills: 0 | Status: COMPLETED | OUTPATIENT
Start: 2018-05-04 | End: 2018-05-04

## 2018-05-04 RX ORDER — ACETAMINOPHEN 500 MG
650 TABLET ORAL ONCE
Qty: 0 | Refills: 0 | Status: COMPLETED | OUTPATIENT
Start: 2018-05-04 | End: 2018-05-04

## 2018-05-04 RX ORDER — CEFEPIME 1 G/1
1000 INJECTION, POWDER, FOR SOLUTION INTRAMUSCULAR; INTRAVENOUS ONCE
Qty: 0 | Refills: 0 | Status: COMPLETED | OUTPATIENT
Start: 2018-05-04 | End: 2018-05-04

## 2018-05-04 RX ORDER — VANCOMYCIN HCL 1 G
1000 VIAL (EA) INTRAVENOUS ONCE
Qty: 0 | Refills: 0 | Status: COMPLETED | OUTPATIENT
Start: 2018-05-04 | End: 2018-05-04

## 2018-05-04 RX ORDER — SODIUM CHLORIDE 9 MG/ML
3 INJECTION INTRAMUSCULAR; INTRAVENOUS; SUBCUTANEOUS ONCE
Qty: 0 | Refills: 0 | Status: COMPLETED | OUTPATIENT
Start: 2018-05-04 | End: 2018-05-04

## 2018-05-04 RX ADMIN — SODIUM CHLORIDE 50 MILLILITER(S): 9 INJECTION INTRAMUSCULAR; INTRAVENOUS; SUBCUTANEOUS at 20:30

## 2018-05-04 RX ADMIN — Medication 250 MILLIGRAM(S): at 20:45

## 2018-05-04 RX ADMIN — CEFEPIME 1000 MILLIGRAM(S): 1 INJECTION, POWDER, FOR SOLUTION INTRAMUSCULAR; INTRAVENOUS at 20:35

## 2018-05-04 RX ADMIN — Medication 650 MILLIGRAM(S): at 20:45

## 2018-05-04 RX ADMIN — SODIUM CHLORIDE 3 MILLILITER(S): 9 INJECTION INTRAMUSCULAR; INTRAVENOUS; SUBCUTANEOUS at 20:12

## 2018-05-04 NOTE — ED PROVIDER NOTE - SKIN, MLM
+small diffuse lesions throughout her upper and lower extremities consistent with merkel cell. RLE erythema and warmth to right calf concerning for cellulitis.

## 2018-05-04 NOTE — ED PROVIDER NOTE - OBJECTIVE STATEMENT
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 Pt reports a fever (100.8 Tmax) this afternoon. +rash on RLE. Pt notes loose stools x3 days. Pt was constipated recently and given stool softener by oncologist. No cough, CP, SOB, abd pain, urinary complaints. 66 y/o F with a PMHx of Merkel cell carcinoma (chemo 1.5 weeks ago), HLD, HTN, s/p kidney transplant (17 years ago, off immunotherapy for last 6 weeks) presents to the ED regarding generalized weakness x1.5 weeks. Pt reports a fever (100.8 Tmax) this afternoon. +rash on RLE. Pt notes loose stools x3 days. Pt was constipated recently and given stool softener by oncologist. No cough, CP, SOB, abd pain, urinary complaints.

## 2018-05-04 NOTE — ED ADULT TRIAGE NOTE - CHIEF COMPLAINT QUOTE
sent in by evan for a fever 100.8 with loose stools. pt undergoing chemo for skin CA. last treatment 4/28 hx of MRSA/ CDIFF

## 2018-05-04 NOTE — ED ADULT NURSE NOTE - OBJECTIVE STATEMENT
pt arrives to ED complaining of fever. pt with history of merkel skin carcinoma on chemo. pt told me oncologist to seek care at ED for any fever over 100.5. at home pt had fever at 100.6. on arrival pt complaining of weakness. afebrile on arrival. sepsis workup. alert and oriented x 4.

## 2018-05-04 NOTE — ED PROVIDER NOTE - PROGRESS NOTE DETAILS
Lilo HECTOR for ED attending, Dr. Amaya: Case discussed with Dr. Francois, oncology fellow at [facility]. Agrees with plan for IV abx, anti-pyretic ,fluids, and admission. Delay in abx and fluids as pt did not want anything given until case discussed with own private oncologist. Lilo HECTOR for ED attending, Dr. Amaya: Case discussed with Dr. Francois, oncology fellow at [facility]. Agrees with plan for IV abx, anti-pyretics ,fluids, and admission. Delay in abx and fluids as pt did not want anything given until case discussed with own private oncologist. Monique DO: Patient found to be neutropenic; to be admitted.

## 2018-05-04 NOTE — ED PROVIDER NOTE - MEDICAL DECISION MAKING DETAILS
66 y/o F with merkel cell CA p/w fever, concern for cellulitis. Will order EKG, CXR, labs, Doppler, UA, and admit.

## 2018-05-05 LAB
-  K. PNEUMONIAE GROUP: SIGNIFICANT CHANGE UP
ANION GAP SERPL CALC-SCNC: 12 MMOL/L — SIGNIFICANT CHANGE UP (ref 5–17)
ANISOCYTOSIS BLD QL: SLIGHT — SIGNIFICANT CHANGE UP
BASOPHILS # BLD AUTO: 0 K/UL — SIGNIFICANT CHANGE UP (ref 0–0.2)
BASOPHILS NFR BLD AUTO: 0 % — SIGNIFICANT CHANGE UP (ref 0–2)
BUN SERPL-MCNC: 38 MG/DL — HIGH (ref 7–23)
CALCIUM SERPL-MCNC: 8 MG/DL — LOW (ref 8.5–10.1)
CHLORIDE SERPL-SCNC: 107 MMOL/L — SIGNIFICANT CHANGE UP (ref 96–108)
CO2 SERPL-SCNC: 19 MMOL/L — LOW (ref 22–31)
CREAT SERPL-MCNC: 1.23 MG/DL — SIGNIFICANT CHANGE UP (ref 0.5–1.3)
CULTURE RESULTS: SIGNIFICANT CHANGE UP
DACRYOCYTES BLD QL SMEAR: SLIGHT — SIGNIFICANT CHANGE UP
ELLIPTOCYTES BLD QL SMEAR: SLIGHT — SIGNIFICANT CHANGE UP
EOSINOPHIL # BLD AUTO: 0 K/UL — SIGNIFICANT CHANGE UP (ref 0–0.5)
EOSINOPHIL NFR BLD AUTO: 1 % — SIGNIFICANT CHANGE UP (ref 0–6)
GLUCOSE SERPL-MCNC: 90 MG/DL — SIGNIFICANT CHANGE UP (ref 70–99)
GRAM STN FLD: SIGNIFICANT CHANGE UP
HCT VFR BLD CALC: 28.7 % — LOW (ref 34.5–45)
HGB BLD-MCNC: 9.6 G/DL — LOW (ref 11.5–15.5)
LYMPHOCYTES # BLD AUTO: 0.22 K/UL — LOW (ref 1–3.3)
LYMPHOCYTES # BLD AUTO: 93 % — HIGH (ref 13–44)
MANUAL SMEAR VERIFICATION: SIGNIFICANT CHANGE UP
MCHC RBC-ENTMCNC: 31.3 PG — SIGNIFICANT CHANGE UP (ref 27–34)
MCHC RBC-ENTMCNC: 33.4 GM/DL — SIGNIFICANT CHANGE UP (ref 32–36)
MCV RBC AUTO: 93.5 FL — SIGNIFICANT CHANGE UP (ref 80–100)
METHOD TYPE: SIGNIFICANT CHANGE UP
MONOCYTES # BLD AUTO: 0.01 K/UL — SIGNIFICANT CHANGE UP (ref 0–0.9)
MONOCYTES NFR BLD AUTO: 5 % — SIGNIFICANT CHANGE UP (ref 2–14)
NEUTROPHILS # BLD AUTO: 0 K/UL — LOW (ref 1.8–7.4)
NEUTROPHILS NFR BLD AUTO: 1 % — LOW (ref 43–77)
NRBC # BLD: 0 /100 — SIGNIFICANT CHANGE UP (ref 0–0)
NRBC # BLD: 0 /100 — SIGNIFICANT CHANGE UP (ref 0–0)
NRBC # BLD: SIGNIFICANT CHANGE UP /100 WBCS (ref 0–0)
NRBC # BLD: SIGNIFICANT CHANGE UP /100 WBCS (ref 0–0)
OVALOCYTES BLD QL SMEAR: SLIGHT — SIGNIFICANT CHANGE UP
PLAT MORPH BLD: NORMAL — SIGNIFICANT CHANGE UP
PLATELET # BLD AUTO: 39 K/UL — LOW (ref 150–400)
POIKILOCYTOSIS BLD QL AUTO: SLIGHT — SIGNIFICANT CHANGE UP
POTASSIUM SERPL-MCNC: 4.2 MMOL/L — SIGNIFICANT CHANGE UP (ref 3.5–5.3)
POTASSIUM SERPL-SCNC: 4.2 MMOL/L — SIGNIFICANT CHANGE UP (ref 3.5–5.3)
RBC # BLD: 3.07 M/UL — LOW (ref 3.8–5.2)
RBC # FLD: 16.2 % — HIGH (ref 10.3–14.5)
RBC BLD AUTO: (no result)
SODIUM SERPL-SCNC: 138 MMOL/L — SIGNIFICANT CHANGE UP (ref 135–145)
SPECIMEN SOURCE: SIGNIFICANT CHANGE UP
SPECIMEN SOURCE: SIGNIFICANT CHANGE UP
SPHEROCYTES BLD QL SMEAR: SLIGHT — SIGNIFICANT CHANGE UP
WBC # BLD: 0.24 K/UL — CRITICAL LOW (ref 3.8–10.5)
WBC # FLD AUTO: 0.24 K/UL — CRITICAL LOW (ref 3.8–10.5)

## 2018-05-05 RX ORDER — SIMVASTATIN 20 MG/1
20 TABLET, FILM COATED ORAL AT BEDTIME
Qty: 0 | Refills: 0 | Status: DISCONTINUED | OUTPATIENT
Start: 2018-05-05 | End: 2018-05-11

## 2018-05-05 RX ORDER — DAPTOMYCIN 500 MG/10ML
200 INJECTION, POWDER, LYOPHILIZED, FOR SOLUTION INTRAVENOUS EVERY 24 HOURS
Qty: 0 | Refills: 0 | Status: DISCONTINUED | OUTPATIENT
Start: 2018-05-05 | End: 2018-05-10

## 2018-05-05 RX ORDER — AMLODIPINE BESYLATE 2.5 MG/1
5 TABLET ORAL DAILY
Qty: 0 | Refills: 0 | Status: DISCONTINUED | OUTPATIENT
Start: 2018-05-05 | End: 2018-05-06

## 2018-05-05 RX ORDER — VANCOMYCIN HCL 1 G
125 VIAL (EA) INTRAVENOUS EVERY 6 HOURS
Qty: 0 | Refills: 0 | Status: DISCONTINUED | OUTPATIENT
Start: 2018-05-05 | End: 2018-05-11

## 2018-05-05 RX ORDER — ASPIRIN/CALCIUM CARB/MAGNESIUM 324 MG
81 TABLET ORAL DAILY
Qty: 0 | Refills: 0 | Status: DISCONTINUED | OUTPATIENT
Start: 2018-05-05 | End: 2018-05-06

## 2018-05-05 RX ORDER — VANCOMYCIN HCL 1 G
1 VIAL (EA) INTRAVENOUS
Qty: 0 | Refills: 0 | COMMUNITY

## 2018-05-05 RX ORDER — CEFEPIME 1 G/1
1000 INJECTION, POWDER, FOR SOLUTION INTRAMUSCULAR; INTRAVENOUS EVERY 12 HOURS
Qty: 0 | Refills: 0 | Status: DISCONTINUED | OUTPATIENT
Start: 2018-05-05 | End: 2018-05-05

## 2018-05-05 RX ORDER — METOPROLOL TARTRATE 50 MG
50 TABLET ORAL
Qty: 0 | Refills: 0 | Status: DISCONTINUED | OUTPATIENT
Start: 2018-05-05 | End: 2018-05-11

## 2018-05-05 RX ORDER — ACETAMINOPHEN 500 MG
650 TABLET ORAL EVERY 6 HOURS
Qty: 0 | Refills: 0 | Status: DISCONTINUED | OUTPATIENT
Start: 2018-05-05 | End: 2018-05-11

## 2018-05-05 RX ORDER — CEFEPIME 1 G/1
1000 INJECTION, POWDER, FOR SOLUTION INTRAMUSCULAR; INTRAVENOUS EVERY 12 HOURS
Qty: 0 | Refills: 0 | Status: DISCONTINUED | OUTPATIENT
Start: 2018-05-05 | End: 2018-05-10

## 2018-05-05 RX ORDER — SODIUM CHLORIDE 9 MG/ML
1000 INJECTION INTRAMUSCULAR; INTRAVENOUS; SUBCUTANEOUS
Qty: 0 | Refills: 0 | Status: DISCONTINUED | OUTPATIENT
Start: 2018-05-05 | End: 2018-05-09

## 2018-05-05 RX ADMIN — SODIUM CHLORIDE 125 MILLILITER(S): 9 INJECTION INTRAMUSCULAR; INTRAVENOUS; SUBCUTANEOUS at 21:55

## 2018-05-05 RX ADMIN — DAPTOMYCIN 108 MILLIGRAM(S): 500 INJECTION, POWDER, LYOPHILIZED, FOR SOLUTION INTRAVENOUS at 11:18

## 2018-05-05 RX ADMIN — AMLODIPINE BESYLATE 5 MILLIGRAM(S): 2.5 TABLET ORAL at 06:51

## 2018-05-05 RX ADMIN — Medication 50 MILLIGRAM(S): at 09:30

## 2018-05-05 RX ADMIN — Medication 125 MILLIGRAM(S): at 11:18

## 2018-05-05 RX ADMIN — CEFEPIME 1000 MILLIGRAM(S): 1 INJECTION, POWDER, FOR SOLUTION INTRAMUSCULAR; INTRAVENOUS at 17:38

## 2018-05-05 RX ADMIN — Medication 125 MILLIGRAM(S): at 17:27

## 2018-05-05 RX ADMIN — Medication 5 MILLIGRAM(S): at 05:53

## 2018-05-05 RX ADMIN — SIMVASTATIN 20 MILLIGRAM(S): 20 TABLET, FILM COATED ORAL at 21:55

## 2018-05-05 RX ADMIN — Medication 81 MILLIGRAM(S): at 09:29

## 2018-05-05 RX ADMIN — SODIUM CHLORIDE 125 MILLILITER(S): 9 INJECTION INTRAMUSCULAR; INTRAVENOUS; SUBCUTANEOUS at 11:19

## 2018-05-05 RX ADMIN — CEFEPIME 1000 MILLIGRAM(S): 1 INJECTION, POWDER, FOR SOLUTION INTRAMUSCULAR; INTRAVENOUS at 05:53

## 2018-05-05 RX ADMIN — SODIUM CHLORIDE 125 MILLILITER(S): 9 INJECTION INTRAMUSCULAR; INTRAVENOUS; SUBCUTANEOUS at 03:51

## 2018-05-05 NOTE — PROGRESS NOTE ADULT - ASSESSMENT
66 yo female presented with Neutropenia and fever.    A/P:    1.  Sepsis  Neutropenia  Fever   -keep on isolation  -give IV antibiotics  -follow cultures  -follow ID consult  -maintain Neutropenic precaution    3.  CKD stage 3  h/o Renal transplant  -will monitor kidney function  -continue prednisone    4.  HTN  -follow BP and adjust medication as needed    5.  Cannot give chemical Anticoagulation due to very low platelet.  Cannot SCD either due to extensive lesions in both legs. 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 Fever.  Sepsis. Neutropenia  - unknown source   - Neutropenic precautions   - CXR neg   - UA+, await for UCX   - BCX: + Klebsiela pneumonia in   anaerobic CX   - C/w IV Abxs as per Dr Erazo: On Dapto and Cefepime   -D/w Dr Erazo       3. CKD stage 3  h/o Renal transplant  Renal Fx is stable   Continue prednisone  Avoid nephrotoxic agents     4. HTN  Monitor BP  C/w amlodipine     5. Merkel cell carcinoma. Pancytopenia   On Chemo  Pancytopenia likely due to chemo  Had Neulasta  Monitor cbc daily     6. DVT PPXs: Cannot give chemical Anticoagulation due to very low platelet.  Cannot SCD either due to extensive lesions in both legs.

## 2018-05-05 NOTE — CHART NOTE - NSCHARTNOTEFT_GEN_A_CORE
Upon Nutritional Assessment by the Registered Dietitian your patient was determined to meet criteria / has evidence of the following diagnosis/diagnoses:          [ ]  Mild Protein Calorie Malnutrition        [x]  Moderate Protein Calorie Malnutrition        [ ] Severe Protein Calorie Malnutrition        [ ] Unspecified Protein Calorie Malnutrition        [x] Underweight / BMI <19        [ ] Morbid Obesity / BMI > 40      Findings:  Pt known to nutr service from previous admission.  NFPE noted for mild temporal and clavicle muscle wasting and moderate calf muscle wasting.  Pt with significant wt loss over 4 months (9%).  Pt endorses eating well prior to current illness with 3 meals per day (full plates).  Pt c/w underwt BMI of 17.9.  Pt with increased metabolic needs 2/2 CA.  Based on above, pt meets criteria for moderate malnutrition in context of chronic illness.  Pt not wanting oral supplements.  d/w pt importance of protein/calorie intake and encouraged full meals plus high protein/calorie snack between meals to optimize intake.  Pt verbalized understanding.      Findings as based on:  •  Comprehensive nutrition assessment and consultation  •  Calorie counts (nutrient intake analysis)  •  Food acceptance and intake status from observations by staff  •  Follow up  •  Patient education  •  Intervention secondary to interdisciplinary rounds  •   concerns      Treatment:    The following diet has been recommended:   1) c/w regular diet Rx and encourage high protein snack between meals 2) monitor PO intake/wt closely 3) add MVI with minerals daily to ensure 100% RDI met    PROVIDER Section:     By signing this assessment you are acknowledging and agree with the diagnosis/diagnoses assigned by the Registered Dietitian    Comments:

## 2018-05-05 NOTE — H&P ADULT - NSHPPHYSICALEXAM_GEN_ALL_CORE
Vital Signs Last 24 Hrs  T(C): 37.3 (05 May 2018 02:07), Max: 37.7 (05 May 2018 01:15)  T(F): 99.1 (05 May 2018 02:07), Max: 99.8 (05 May 2018 01:15)  HR: 101 (05 May 2018 02:07) (101 - 114)  BP: 111/62 (05 May 2018 02:07) (108/69 - 129/72)  RR: 18 (05 May 2018 01:15) (16 - 18)  SpO2: 95% (05 May 2018 01:15) (95% - 97%)

## 2018-05-05 NOTE — H&P ADULT - ASSESSMENT
66 yo female presented with Neutropenia and fever.    A/P:    1.  Sepsis  Neutropenia  Fever   -keep on isolation  -give IV antibiotics  -follow cultures  -follow ID consult  -maintain Neutropenic precaution    3.  CKD stage 3  h/o Renal transplant  -will monitor kidney function  -continue prednisone    4.  HTN  -follow BP and adjust medication as needed    5.  Cannot give chemical Anticoagulation due to very low platelet.  Cannot SCD either due to extensive lesions in both legs.

## 2018-05-05 NOTE — DIETITIAN INITIAL EVALUATION ADULT. - OTHER INFO
68yo female with PMH of merkel cell carcinoma, HLD,HTN, s/p kidney transplant (off immunotherapy) p/w generalized weakness x 1.5 wks with fever.  Pt reports loose stools x 3 days.  Pt found to have sepsis, on neutropenic precaution.  Pt known to nutr service from previous admission.  NFPE noted for mild temporal and clavicle muscle wasting and moderate calf muscle wasting.  Pt with significant wt loss over 4 months (9%).  Pt endorses eating well prior to current illness with 3 meals per day (full plates).  Pt c/w underwt BMI of 17.9.  Pt with increased metabolic needs 2/2 CA.  Based on above, pt meets criteria for moderate malnutrition in context of chronic illness.  Pt not wanting oral supplements.  d/w pt importance of protein/calorie intake and encouraged full meals plus high protein/calorie snack between meals to optimize intake.  Pt verbalized understanding.  RECOMMENDATIONS: 1) c/w regular diet Rx and encourage high protein snack between meals 2) monitor PO intake/wt closely 3) add MVI with minerals daily to ensure 100% RDI met

## 2018-05-05 NOTE — PROGRESS NOTE ADULT - SUBJECTIVE AND OBJECTIVE BOX
CC: Sent in by Dr. carole Roca for fever (05 May 2018 03:18)    HPI:  68 y/o F with a PMHx of Merkel cell carcinoma (chemo 1.5 weeks ago), HLD, HTN, s/p kidney transplant (17 years ago, off immunotherapy for last 6 weeks) presented to the ED regarding generalized weakness x1.5 weeks. Pt reports a fever (100.8 Tmax) this afternoon. +rash on RLE. Pt notes loose stools x3 days. Pt was constipated recently and given stool softener by oncologist. No cough, CP, SOB, abd pain, urinary complaints. (05 May 2018 03:08)    INTERVAL HPI/OVERNIGHT EVENTS:    Vital Signs Last 24 Hrs  T(C): 36.3 (05 May 2018 05:52), Max: 37.7 (05 May 2018 01:15)  T(F): 97.3 (05 May 2018 05:52), Max: 99.8 (05 May 2018 01:15)  HR: 62 (05 May 2018 05:52) (62 - 114)  BP: 144/63 (05 May 2018 05:52) (108/69 - 144/63)  RR: 18 (05 May 2018 05:52) (16 - 18)  SpO2: 99% (05 May 2018 05:52) (95% - 99%)  I&O's Detail    05 May 2018 07:01  -  05 May 2018 11:25  --------------------------------------------------------  IN:    sodium chloride 0.9%.: 975 mL  Total IN: 975 mL    OUT:  Total OUT: 0 mL    Total NET: 975 mL        REVIEW OF SYSTEMS:    CONSTITUTIONAL: No weakness, fevers or chills  EYES/ENT: No visual changes;  No vertigo or throat pain   NECK: No pain or stiffness  RESPIRATORY: No cough, wheezing, hemoptysis; No shortness of breath  CARDIOVASCULAR: No chest pain or palpitations  GASTROINTESTINAL: No abdominal or epigastric pain. No nausea, vomiting, or hematemesis; No diarrhea or constipation. No melena or hematochezia.  GENITOURINARY: No dysuria, frequency or hematuria  NEUROLOGICAL: No numbness or weakness  SKIN: No itching, burning, rashes, or lesions   All other review of systems is negative unless indicated above.  PHYSICAL EXAM:    General: Well developed; well nourished; in no acute distress  Eyes: PERRLA, EOMI; conjunctiva and sclera clear  Head: Normocephalic; atraumatic  ENMT: No nasal discharge; airway clear  Neck: Supple; non tender; no masses  Respiratory: No wheezes, rales or rhonchi  Cardiovascular: Regular rate and rhythm. S1 and S2 Normal; No murmurs, gallops or rubs  Gastrointestinal: Soft non-tender non-distended; Normal bowel sounds  Genitourinary: No costovertebral angle tenderness  Extremities: Normal range of motion, No clubbing, cyanosis or edema  Vascular: Peripheral pulses palpable 2+ bilaterally  Neurological: Alert and oriented x4  Skin: Warm and dry. No acute rash  Lymph Nodes: No acute cervical adenopathy  Musculoskeletal: Normal gait, tone, without deformities  Psychiatric: Cooperative and appropriate                            9.6    0.24  )-----------( 39       ( 05 May 2018 06:30 )             28.7     05 May 2018 06:30    138    |  107    |  38     ----------------------------<  90     4.2     |  19     |  1.23     Ca    8.0        05 May 2018 06:30    TPro  6.5    /  Alb  3.0    /  TBili  0.8    /  DBili  x      /  AST  20     /  ALT  15     /  AlkPhos  76     04 May 2018 19:58    PT/INR - ( 04 May 2018 19:58 )   PT: 10.4 sec;   INR: 0.96 ratio         PTT - ( 04 May 2018 19:58 )  PTT:25.8 sec  CAPILLARY BLOOD GLUCOSE        LIVER FUNCTIONS - ( 04 May 2018 19:58 )  Alb: 3.0 g/dL / Pro: 6.5 gm/dL / ALK PHOS: 76 U/L / ALT: 15 U/L / AST: 20 U/L / GGT: x           Urinalysis Basic - ( 04 May 2018 19:58 )    Color: Yellow / Appearance: Clear / S.010 / pH: x  Gluc: x / Ketone: Negative  / Bili: Negative / Urobili: Negative mg/dL   Blood: x / Protein: 100 mg/dL / Nitrite: Negative   Leuk Esterase: Small / RBC: 6-10 /HPF / WBC 6-10   Sq Epi: x / Non Sq Epi: Moderate / Bacteria: Moderate        MEDICATIONS  (STANDING):  amLODIPine   Tablet 5 milliGRAM(s) Oral daily  aspirin enteric coated 81 milliGRAM(s) Oral daily  cefepime  Injectable. 1000 milliGRAM(s) IV Push every 12 hours  DAPTOmycin IVPB 200 milliGRAM(s) IV Intermittent every 24 hours  metoprolol tartrate 50 milliGRAM(s) Oral two times a day  predniSONE   Tablet 5 milliGRAM(s) Oral daily  simvastatin 20 milliGRAM(s) Oral at bedtime  sodium chloride 0.9%. 1000 milliLiter(s) (125 mL/Hr) IV Continuous <Continuous>  vancomycin    Solution 125 milliGRAM(s) Oral every 6 hours    MEDICATIONS  (PRN):  acetaminophen   Tablet 650 milliGRAM(s) Oral every 6 hours PRN For Temp greater than 38 C (100.4 F)      RADIOLOGY & ADDITIONAL TESTS: CC: Sent in by Dr. carole Roca for fever (05 May 2018 03:18)    HPI:  66 y/o F with a PMHx of Merkel cell carcinoma (chemo 1.5 weeks ago), HLD, HTN, s/p kidney transplant (17 years ago, off immunotherapy for last 6 weeks) presented to the ED regarding generalized weakness x1.5 weeks. Pt reports a fever (100.8 Tmax) this afternoon. +rash on RLE. Pt notes loose stools x3 days. Pt was constipated recently and given stool softener by oncologist. No cough, CP, SOB, abd pain, urinary complaints. (05 May 2018 03:08)    INTERVAL HPI/ OVERNIGHT EVENTS: Chart reviewed, Pt was seen and examined, reports feeling better overall, no fevers today. States that had Neulasta after last chemoTx .  Had one loose stool.  Results and POC discussed     Vital Signs Last 24 Hrs  T(C): 36.3 (05 May 2018 05:52), Max: 37.7 (05 May 2018 01:15)  T(F): 97.3 (05 May 2018 05:52), Max: 99.8 (05 May 2018 01:15)  HR: 62 (05 May 2018 05:52) (62 - 114)  BP: 144/63 (05 May 2018 05:52) (108/69 - 144/63)  RR: 18 (05 May 2018 05:52) (16 - 18)  SpO2: 99% (05 May 2018 05:52) (95% - 99%)    REVIEW OF SYSTEMS:  All other review of systems is negative unless indicated above.      PHYSICAL EXAM:  General: Well developed; malnourished; in no acute distress  Eyes: PERRLA, EOMI; conjunctiva and sclera clear  Head: Normocephalic; atraumatic  ENMT: No nasal discharge; airway clear  Neck: Supple; non tender; no masses  Respiratory: Good air entry, No wheezes, rales or rhonchi  Cardiovascular: Regular rate and rhythm. S1 and S2 Normal; No murmurs  Gastrointestinal: Soft non-tender non-distended; Normal bowel sounds  Genitourinary: No costovertebral angle tenderness  Extremities: Normal range of motion, No  edema. Multiple LE skin lesions, some with scabbing and surrounding erythema, no purulent discharge noted   Vascular: Peripheral pulses palpable 2+ bilaterally  Neurological: Alert and oriented x4, non focal   Skin: Warm and dry. No acute rash  Lymph Nodes: No acute cervical adenopathy  Musculoskeletal: Normal gait, tone, without deformities  Psychiatric: Cooperative and appropriate        LABS:                     9.6    0.24  )-----------( 39       ( 05 May 2018 06:30 )             28.7     05 May 2018 06:30    138    |  107    |  38     ----------------------------<  90     4.2     |  19     |  1.23     Ca    8.0        05 May 2018 06:30    TPro  6.5    /  Alb  3.0    /  TBili  0.8    /  DBili  x      /  AST  20     /  ALT  15     /  AlkPhos  76     04 May 2018 19:58  PT/INR - ( 04 May 2018 19:58 )   PT: 10.4 sec;   INR: 0.96 ratio    PTT - ( 04 May 2018 19:58 )  PTT:25.8 sec          LIVER FUNCTIONS - ( 04 May 2018 19:58 )  Alb: 3.0 g/dL / Pro: 6.5 gm/dL / ALK PHOS: 76 U/L / ALT: 15 U/L / AST: 20 U/L / GGT: x           Urinalysis Basic - ( 04 May 2018 19:58 )    Color: Yellow / Appearance: Clear / S.010 / pH: x  Gluc: x / Ketone: Negative  / Bili: Negative / Urobili: Negative mg/dL   Blood: x / Protein: 100 mg/dL / Nitrite: Negative   Leuk Esterase: Small / RBC: 6-10 /HPF / WBC 6-10   Sq Epi: x / Non Sq Epi: Moderate / Bacteria: Moderate      Culture - Blood (18 @ 19:58)    Gram Stain:   Growth in anaerobic bottle: Gram Negative Rods    -  Klebsiella pneumoniae: Detec    Specimen Source: .Blood None    Organism: Blood Culture PCR    Culture Results:   Growth in anaerobic bottle: Gram Negative Rods  "Due to technical problems, Proteus sp. will Not be reported as part of  the BCID panel until further notice"  ***Blood Panel PCR results on this specimen are available  approximately 3 hours after the Gram stain result.***  Gram stain, PCR, and/or culture results may not always  correspond due to difference in methodologies.  ************************************************************  This PCR assay was performed using [employer].  The following targets are tested for: Enterococcus,  vancomycin resistant enterococci, Listeria monocytogenes,  coagulase negative staphylococci, S. aureus,  methicillin resistant S. aureus, Streptococcus agalactiae  (Group B), S. pneumoniae, S. pyogenes (Group A),  Acinetobacter baumannii, Enterobacter cloacae, E. coli,  Klebsiella oxytoca, K. pneumoniae, Proteus sp.,  Serratia marcescens, Haemophilus influenzae,  Neisseria meningitidis, Pseudomonas aeruginosa, Candida  albicans, C. glabrata, C krusei, C parapsilosis,  C. tropicalis and the KPC resistance gene.    Organism Identification: Blood Culture PCR    Method Type: PCR        MEDICATIONS  (STANDING):  amLODIPine   Tablet 5 milliGRAM(s) Oral daily  aspirin enteric coated 81 milliGRAM(s) Oral daily  cefepime  Injectable. 1000 milliGRAM(s) IV Push every 12 hours  DAPTOmycin IVPB 200 milliGRAM(s) IV Intermittent every 24 hours  metoprolol tartrate 50 milliGRAM(s) Oral two times a day  predniSONE   Tablet 5 milliGRAM(s) Oral daily  simvastatin 20 milliGRAM(s) Oral at bedtime  sodium chloride 0.9%. 1000 milliLiter(s) (125 mL/Hr) IV Continuous <Continuous>  vancomycin    Solution 125 milliGRAM(s) Oral every 6 hours    MEDICATIONS  (PRN):  acetaminophen   Tablet 650 milliGRAM(s) Oral every 6 hours PRN For Temp greater than 38 C (100.4 F)      RADIOLOGY & ADDITIONAL TESTS:    EXAM:  XR CHEST AP OR PA 1V                        PROCEDURE DATE:  2018    Findings:     The lungs are clear. Heart size is mildly enlarged. The visualized   osseous structures are unremarkable. Surgical clips are noted within the   left neck soft tissues    Impression: Mild cardiomegaly.      EXAM:  US DPLX LWR EXT VEINS COMPL BI                        PROCEDURE DATE:  2018        There is no sonographic evidence for deep vein thrombosis in the in the   common femoral, femoral, and popliteal veins of the right and left leg.   The veins are patent compressible with normal venous waveforms and   augmentation. Calf veins are patent to the level of the proximal   posterior tibial vein.    No abnormal fluid collection is noted in the popliteal region.    IMPRESSION: Normal lower extremity venous Doppler of the femoral and   popliteal veins of the right and left leg to the level of the proximal   posterior tibial vein.

## 2018-05-05 NOTE — H&P ADULT - NEUROLOGICAL DETAILS
sensation intact/cranial nerves intact/alert and oriented x 3/normal strength/responds to verbal commands/deep reflexes intact/responds to pain

## 2018-05-05 NOTE — CONSULT NOTE ADULT - ASSESSMENT
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 Notes a history of cdiff as well and had a loose bowel movement. No rectal pain.  1. Patient admitted with neutropenic fever, multiple skin lesions, many look superinfected and patient notes history of colonization with MRSA, also history of Cdiff in past  - had stopped her immunosuppression for her kidney transplant a few weeks ago as well  - follow up cultures   - iv hydration and supportive care   - serial cbc and monitor temperature   - reviewed prior medical records to evaluate for resistant or atypical pathogens   - agree with cefepime as ordered to cover neutropenia  - will add daptomycin to cover for MRSA, so as to avoid vancomycin in this patient with history renal transplant and CKD  - will start po vancomycin to prevent cdiff colitis  - contact precautions  2. other issues:  Merkel cell carcinoma (chemo 1.5 weeks ago), HLD, HTN, s/p kidney transplant (17 years ago, off immunotherapy for last 6 weeks)   - per medicine

## 2018-05-05 NOTE — H&P ADULT - HISTORY OF PRESENT ILLNESS
66 y/o F with a PMHx of Merkel cell carcinoma (chemo 1.5 weeks ago), HLD, HTN, s/p kidney transplant (17 years ago, off immunotherapy for last 6 weeks) presented to the ED regarding generalized weakness x1.5 weeks. Pt reports a fever (100.8 Tmax) this afternoon. +rash on RLE. Pt notes loose stools x3 days. Pt was constipated recently and given stool softener by oncologist. No cough, CP, SOB, abd pain, urinary complaints.

## 2018-05-06 LAB
HCT VFR BLD CALC: 26.9 % — LOW (ref 34.5–45)
HGB BLD-MCNC: 8.8 G/DL — LOW (ref 11.5–15.5)
MCHC RBC-ENTMCNC: 31.3 PG — SIGNIFICANT CHANGE UP (ref 27–34)
MCHC RBC-ENTMCNC: 32.7 GM/DL — SIGNIFICANT CHANGE UP (ref 32–36)
MCV RBC AUTO: 95.7 FL — SIGNIFICANT CHANGE UP (ref 80–100)
NRBC # BLD: 0 /100 WBCS — SIGNIFICANT CHANGE UP (ref 0–0)
OB PNL STL: NEGATIVE — SIGNIFICANT CHANGE UP
PLATELET # BLD AUTO: 19 K/UL — CRITICAL LOW (ref 150–400)
RBC # BLD: 2.81 M/UL — LOW (ref 3.8–5.2)
RBC # FLD: 16.2 % — HIGH (ref 10.3–14.5)
WBC # BLD: 0.28 K/UL — CRITICAL LOW (ref 3.8–10.5)
WBC # FLD AUTO: 0.28 K/UL — CRITICAL LOW (ref 3.8–10.5)

## 2018-05-06 PROCEDURE — 93010 ELECTROCARDIOGRAM REPORT: CPT

## 2018-05-06 RX ORDER — METRONIDAZOLE 500 MG
500 TABLET ORAL EVERY 8 HOURS
Qty: 0 | Refills: 0 | Status: DISCONTINUED | OUTPATIENT
Start: 2018-05-06 | End: 2018-05-10

## 2018-05-06 RX ORDER — MUPIROCIN 20 MG/G
1 OINTMENT TOPICAL
Qty: 0 | Refills: 0 | Status: DISCONTINUED | OUTPATIENT
Start: 2018-05-06 | End: 2018-05-11

## 2018-05-06 RX ADMIN — Medication 125 MILLIGRAM(S): at 18:32

## 2018-05-06 RX ADMIN — Medication 125 MILLIGRAM(S): at 13:54

## 2018-05-06 RX ADMIN — Medication 100 MILLIGRAM(S): at 12:55

## 2018-05-06 RX ADMIN — CEFEPIME 1000 MILLIGRAM(S): 1 INJECTION, POWDER, FOR SOLUTION INTRAMUSCULAR; INTRAVENOUS at 06:03

## 2018-05-06 RX ADMIN — SIMVASTATIN 20 MILLIGRAM(S): 20 TABLET, FILM COATED ORAL at 21:37

## 2018-05-06 RX ADMIN — Medication 5 MILLIGRAM(S): at 06:03

## 2018-05-06 RX ADMIN — MUPIROCIN 1 APPLICATION(S): 20 OINTMENT TOPICAL at 18:28

## 2018-05-06 RX ADMIN — Medication 100 MILLIGRAM(S): at 21:37

## 2018-05-06 RX ADMIN — Medication 125 MILLIGRAM(S): at 00:46

## 2018-05-06 RX ADMIN — Medication 125 MILLIGRAM(S): at 23:10

## 2018-05-06 RX ADMIN — CEFEPIME 1000 MILLIGRAM(S): 1 INJECTION, POWDER, FOR SOLUTION INTRAMUSCULAR; INTRAVENOUS at 18:31

## 2018-05-06 RX ADMIN — SODIUM CHLORIDE 125 MILLILITER(S): 9 INJECTION INTRAMUSCULAR; INTRAVENOUS; SUBCUTANEOUS at 12:55

## 2018-05-06 RX ADMIN — Medication 125 MILLIGRAM(S): at 06:03

## 2018-05-06 RX ADMIN — Medication 50 MILLIGRAM(S): at 18:31

## 2018-05-06 RX ADMIN — DAPTOMYCIN 108 MILLIGRAM(S): 500 INJECTION, POWDER, LYOPHILIZED, FOR SOLUTION INTRAVENOUS at 12:54

## 2018-05-06 NOTE — PROGRESS NOTE ADULT - ASSESSMENT
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 Notes a history of cdiff as well and had a loose bowel movement. No rectal pain.  1. Patient admitted with neutropenic fever, multiple skin lesions, many look superinfected and patient notes history of colonization with MRSA, also history of Cdiff in past  - found to have Klebsiella pneumoniae in blood cultures; most likely bacterial translocation from bowel secondary to neutropenia  - had stopped her immunosuppression for her kidney transplant a few weeks ago as well  - follow up cultures   - iv hydration and supportive care   - serial cbc and monitor temperature   - day #2 daptomycin and cefepime  - will add iv flagyl as well given neutropenia  - day #2 po vancomycin to prevent cdiff colitis  - contact precautions  2. other issues:  Merkel cell carcinoma (chemo 1.5 weeks ago), HLD, HTN, s/p kidney transplant (17 years ago, off immunotherapy for last 6 weeks)   - per medicine

## 2018-05-06 NOTE — PROGRESS NOTE ADULT - SUBJECTIVE AND OBJECTIVE BOX
CC: Sent in by Dr. lam [place] for fever (05 May 2018 03:18)    HPI:  68 y/o F with a PMHx of Merkel cell carcinoma (chemo 1.5 weeks ago), HLD, HTN, s/p kidney transplant (17 years ago, off immunotherapy for last 6 weeks) presented to the ED regarding generalized weakness x1.5 weeks. Pt reports a fever (100.8 Tmax) this afternoon. +rash on RLE. Pt notes loose stools x3 days. Pt was constipated recently and given stool softener by oncologist. No cough, CP, SOB, abd pain, urinary complaints. (05 May 2018 03:08)    INTERVAL HPI/ OVERNIGHT EVENTS:  Pt was seen and examined, still feels fatigued, some pain at leg lesion sight, no other complains.  Denies any bleeding. Results of blood work and POC discussed. Pt requests to be seen by Dr Aguilar       Vital Signs Last 24 Hrs  T(C): 37.6 (06 May 2018 04:54), Max: 37.6 (06 May 2018 04:54)  T(F): 99.7 (06 May 2018 04:54), Max: 99.7 (06 May 2018 04:54)  HR: 64 (06 May 2018 04:54) (55 - 64)  BP: 118/62 (06 May 2018 04:54) (117/52 - 124/50)  RR: 17 (06 May 2018 04:54) (16 - 17)  SpO2: 92% (06 May 2018 04:54) (92% - 97%)    REVIEW OF SYSTEMS:  All other review of systems is negative unless indicated above.      PHYSICAL EXAM:  General: Well developed; malnourished; in no acute distress  Eyes: PERRLA, EOMI; conjunctiva and sclera clear  Head: Normocephalic; atraumatic  ENMT: No nasal discharge; airway clear  Neck: Supple; non tender; no masses  Respiratory: Good air entry, No wheezes, rales or rhonchi  Cardiovascular: Regular rate and rhythm. S1 and S2 Normal; No murmurs  Gastrointestinal: Soft, non-tender, mildly distended; Normal bowel sounds  Genitourinary: No costovertebral angle tenderness  Extremities: Normal range of motion, No  edema. Multiple LE skin lesions, some with scabbing and surrounding erythema, no purulent discharge noted   Vascular: Peripheral pulses palpable 2+ bilaterally  Neurological: Alert and oriented x4, non focal   Skin: Warm and dry. No acute rash  Lymph Nodes: No acute cervical adenopathy  Musculoskeletal: Normal gait, tone, without deformities  Psychiatric: Cooperative and appropriate        LABS:                         8.8    0.28  )-----------( 19       ( 06 May 2018 06:00 )             26.9     05-05    138  |  107  |  38<H>  ----------------------------<  90  4.2   |  19<L>  |  1.23    Ca    8.0<L>      05 May 2018 06:30      Urinalysis Basic - ( 04 May 2018 19:58 )    Color: Yellow / Appearance: Clear / S.010 / pH: x  Gluc: x / Ketone: Negative  / Bili: Negative / Urobili: Negative mg/dL   Blood: x / Protein: 100 mg/dL / Nitrite: Negative   Leuk Esterase: Small / RBC: 6-10 /HPF / WBC 6-10   Sq Epi: x / Non Sq Epi: Moderate / Bacteria: Moderate    Culture - Urine (18 @ 19:58)    Specimen Source: .Urine None    Culture Results:   <10,000 CFU/ml Normal Urogenital frank present      Culture - Blood (18 @ 19:58)    Gram Stain:   Growth in anaerobic bottle: Gram Negative Rods    -  Klebsiella pneumoniae: Detec    Specimen Source: .Blood None    Organism: Blood Culture PCR    Culture Results:   Growth in anaerobic bottle: Gram Negative Rods  "Due to technical problems, Proteus sp. will Not be reported as part of  the BCID panel until further notice"  ***Blood Panel PCR results on this specimen are available  approximately 3 hours after the Gram stain result.***  Gram stain, PCR, and/or culture results may not always  correspond due to difference in methodologies.  ************************************************************  This PCR assay was performed using [employer].  The following targets are tested for: Enterococcus,  vancomycin resistant enterococci, Listeria monocytogenes,  coagulase negative staphylococci, S. aureus,  methicillin resistant S. aureus, Streptococcus agalactiae  (Group B), S. pneumoniae, S. pyogenes (Group A),  Acinetobacter baumannii, Enterobacter cloacae, E. coli,  Klebsiella oxytoca, K. pneumoniae, Proteus sp.,  Serratia marcescens, Haemophilus influenzae,  Neisseria meningitidis, Pseudomonas aeruginosa, Candida  albicans, C. glabrata, C krusei, C parapsilosis,  C. tropicalis and the KPC resistance gene.    Organism Identification: Blood Culture PCR    Method Type: PCR      MEDICATIONS  (STANDING):  amLODIPine   Tablet 5 milliGRAM(s) Oral daily  cefepime  Injectable. 1000 milliGRAM(s) IV Push every 12 hours  DAPTOmycin IVPB 200 milliGRAM(s) IV Intermittent every 24 hours  metoprolol tartrate 50 milliGRAM(s) Oral two times a day  metroNIDAZOLE  IVPB 500 milliGRAM(s) IV Intermittent every 8 hours  predniSONE   Tablet 5 milliGRAM(s) Oral daily  simvastatin 20 milliGRAM(s) Oral at bedtime  sodium chloride 0.9%. 1000 milliLiter(s) (125 mL/Hr) IV Continuous <Continuous>  vancomycin    Solution 125 milliGRAM(s) Oral every 6 hours    MEDICATIONS  (PRN):  acetaminophen   Tablet 650 milliGRAM(s) Oral every 6 hours PRN For Temp greater than 38 C (100.4 F)    RADIOLOGY & ADDITIONAL TESTS:    EXAM:  XR CHEST AP OR PA 1V                        PROCEDURE DATE:  2018    Findings:     The lungs are clear. Heart size is mildly enlarged. The visualized   osseous structures are unremarkable. Surgical clips are noted within the   left neck soft tissues    Impression: Mild cardiomegaly.      EXAM:  US DPLX LWR EXT VEINS COMPL BI                        PROCEDURE DATE:  2018        There is no sonographic evidence for deep vein thrombosis in the in the   common femoral, femoral, and popliteal veins of the right and left leg.   The veins are patent compressible with normal venous waveforms and   augmentation. Calf veins are patent to the level of the proximal   posterior tibial vein.    No abnormal fluid collection is noted in the popliteal region.    IMPRESSION: Normal lower extremity venous Doppler of the femoral and   popliteal veins of the right and left leg to the level of the proximal   posterior tibial vein.

## 2018-05-06 NOTE — PROGRESS NOTE ADULT - ASSESSMENT
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 Fever.  Sepsis. Neutropenia  - unknown source   - Neutropenic precautions   - CXR neg   - UA+,  UCX  neg   - BCX: + Klebsiela pneumonia in   anaerobic CX   - C/w IV Abxs as per Dr Erazo: On Dapto and Cefepime, Flagyl added   - On PO VAnco for C.Diff PPxs as pt has past history   -D/w Dr Erazo       3. CKD stage 3  h/o Renal transplant  Renal Fx is stable   Continue prednisone  Avoid nephrotoxic agents   Renal eval     4. HTN  Monitor BP, stable   C/w amlodipine     5. Merkel cell carcinoma. Pancytopenia   On Chemo: Carboplastin ans Etoposide   Pancytopenia likely due to chemo  Had Neulasta   Plts trending down, no signs of bleeding, monitor closely   Transfuse Plts if <10K or signs of any bleeding   D/c ASA   Monitor cbc daily     6. DVT PPXs: Cannot give chemical Anticoagulation due to very low platelet.  Cannot SCD either due to extensive lesions in both legs.

## 2018-05-06 NOTE — CONSULT NOTE ADULT - ASSESSMENT
68 y/o F with a PMHx of HTN, PCKD w  CKD 3  Cr ~ 1.5 - 1.6 w hx of renal transplant off all immunosupressants since March due to widespread Merkel cell carcinoma on PET Scan now s/p chemo 1.5 weeks ago), HLD, HTN, admitted for:     Fever /Sepsis/ Neutropenia  - Klebsiella + in blood - ? 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 Pancytopenia     - Chemo: Carboplastin ans Etoposide     Pancytopenia likely due to chemo    - Neulasta - another dose ?    - to transfuse Plts if <10K or signs of any bleeding     - heme fup     Thank you for the courtesy of this consult. We will follow this patient with you.   Management is subject to change if new information becomes available or patient condition changes. 68 y/o F with a PMHx of HTN, PCKD w  CKD 3  Cr ~ 1.5 - 1.6 w hx of renal transplant off all immunosupressants since March due to widespread Merkel cell carcinoma on PET Scan now s/p chemo 1.5 weeks ago), HLD, HTN, admitted for:     Fever /Sepsis/ Neutropenia  - Klebsiella + in blood - ? 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 Pancytopenia     - Chemo: Carboplastin ans Etoposide     Pancytopenia likely due to chemo    - Neulasta - another dose ?    - to transfuse Plts if <10K or signs of any bleeding     - heme fup     Thank you for the courtesy of this consult. We will follow this patient with you.   Management is subject to change if new information becomes available or patient condition changes.

## 2018-05-06 NOTE — PROGRESS NOTE ADULT - SUBJECTIVE AND OBJECTIVE BOX
Patient is a 67y old  Female who presents with a chief complaint of Sent in by Dr. carole Roca for fever (05 May 2018 03:18)    Date of service: 05-06-18 @ 08:00  Patient notes mild loose stools that she was incontinent; has no abdominal or perirectal pain; was eating breakfast  ROS: no fever or chills; denies dizziness, no HA, no SOB or cough, no abdominal pain,  no dysuria, no urinary frequency, no legs pain, no rashes    MEDICATIONS  (STANDING):  amLODIPine   Tablet 5 milliGRAM(s) Oral daily  aspirin enteric coated 81 milliGRAM(s) Oral daily  cefepime  Injectable. 1000 milliGRAM(s) IV Push every 12 hours  DAPTOmycin IVPB 200 milliGRAM(s) IV Intermittent every 24 hours  metoprolol tartrate 50 milliGRAM(s) Oral two times a day  metroNIDAZOLE  IVPB 500 milliGRAM(s) IV Intermittent every 8 hours  predniSONE   Tablet 5 milliGRAM(s) Oral daily  simvastatin 20 milliGRAM(s) Oral at bedtime  sodium chloride 0.9%. 1000 milliLiter(s) (125 mL/Hr) IV Continuous <Continuous>  vancomycin    Solution 125 milliGRAM(s) Oral every 6 hours    MEDICATIONS  (PRN):  acetaminophen   Tablet 650 milliGRAM(s) Oral every 6 hours PRN For Temp greater than 38 C (100.4 F)      Vital Signs Last 24 Hrs  T(C): 37.6 (06 May 2018 04:54), Max: 37.6 (06 May 2018 04:54)  T(F): 99.7 (06 May 2018 04:54), Max: 99.7 (06 May 2018 04:54)  HR: 64 (06 May 2018 04:54) (55 - 64)  BP: 118/62 (06 May 2018 04:54) (117/52 - 124/50)  BP(mean): --  RR: 17 (06 May 2018 04:54) (16 - 17)  SpO2: 92% (06 May 2018 04:54) (92% - 97%)    Physical Exam:    PE:    Constitutional: frail looking  HEENT: NC/AT, EOMI, PERRLA, conjunctivae clear; ears and nose atraumatic; pharynx clear  Neck: supple; thyroid not palpable  Respiratory: respiratory effort normal; clear to auscultation  Cardiovascular: S1S2 irregular, no murmurs  Abdomen: soft, not tender, not distended, positive BS; no liver or spleen organomegaly  Genitourinary: no suprapubic tenderness  Musculoskeletal: no muscle tenderness, no joint swelling or tenderness; left upper extremity thrill at radial artery  Neurological/ Psychiatric: AxOx3, judgement and insight normal;  moving all extremities  Skin: multiple palpable lesions, hyperkeratotic lesions in various stages of skin breakdown with surrounding erythema; some draining white fluid on lower and upper extremities    Labs: all available labs reviewed             Labs:                        8.8    0.28  )-----------( 19       ( 06 May 2018 06:00 )             26.9     05-05    138  |  107  |  38<H>  ----------------------------<  90  4.2   |  19<L>  |  1.23    Ca    8.0<L>      05 May 2018 06:30    TPro  6.5  /  Alb  3.0<L>  /  TBili  0.8  /  DBili  x   /  AST  20  /  ALT  15  /  AlkPhos  76  05-04           Cultures:       Culture - Urine (collected 05-04-18 @ 19:58)  Source: .Urine None  Final Report (05-05-18 @ 23:16):    <10,000 CFU/ml Normal Urogenital frank present    Culture - Blood (collected 05-04-18 @ 19:58)  Source: .Blood None  Gram Stain (05-05-18 @ 11:49):    Growth in anaerobic bottle: Gram Negative Rods  Preliminary Report (05-05-18 @ 11:49):    Growth in anaerobic bottle: Gram Negative Rods    "Due to technical problems, Proteus sp. will Not be reported as part of    the BCID panel until further notice"    ***Blood Panel PCR results on this specimen are available    approximately 3 hours after the Gram stain result.***    Gram stain, PCR, and/or culture results may not always    correspond due to difference in methodologies.    ************************************************************    This PCR assay was performed using [employer].    The following targets are tested for: Enterococcus,    vancomycin resistant enterococci, Listeria monocytogenes,    coagulase negative staphylococci, S. aureus,    methicillin resistant S. aureus, Streptococcus agalactiae    (Group B), S. pneumoniae, S. pyogenes (Group A),    Acinetobacter baumannii, Enterobacter cloacae, E. coli,    Klebsiella oxytoca, K. pneumoniae, Proteus sp.,    Serratia marcescens, Haemophilus influenzae,    Neisseria meningitidis, Pseudomonas aeruginosa, Candida    albicans, C. glabrata, C krusei, C parapsilosis,    C. tropicalis and the KPC resistance gene.  Organism: Blood Culture PCR (05-05-18 @ 13:59)  Organism: Blood Culture PCR (05-05-18 @ 13:59)      -  Klebsiella pneumoniae: Detec      Method Type: PCR    Culture - Blood (collected 05-04-18 @ 19:58)  Source: .Blood None  Preliminary Report (05-06-18 @ 01:02):    No growth to date.              Radiology: all available radiological tests reviewed    Advanced directives addressed: full resuscitation

## 2018-05-06 NOTE — PROVIDER CONTACT NOTE (OTHER) - SITUATION
pts hr was 134 earlier received toprol and upon rechecking was 120, upon asucultation was 100, but irregular.

## 2018-05-06 NOTE — CHART NOTE - NSCHARTNOTEFT_GEN_A_CORE
RN noted irregular HR on auscultation. EKG obtained and found normal sinus rhythm with irregular beats. Patient asymptomatic. TTE ordered to rule out pericardial effusion in light of new arrythmia.     Discussed with Dr. Gallegos.

## 2018-05-07 DIAGNOSIS — I34.1 NONRHEUMATIC MITRAL (VALVE) PROLAPSE: ICD-10-CM

## 2018-05-07 DIAGNOSIS — C4A.9 MERKEL CELL CARCINOMA, UNSPECIFIED: ICD-10-CM

## 2018-05-07 DIAGNOSIS — I31.3 PERICARDIAL EFFUSION (NONINFLAMMATORY): ICD-10-CM

## 2018-05-07 DIAGNOSIS — D70.9 NEUTROPENIA, UNSPECIFIED: ICD-10-CM

## 2018-05-07 LAB
-  AMIKACIN: SIGNIFICANT CHANGE UP
-  AMPICILLIN/SULBACTAM: SIGNIFICANT CHANGE UP
-  AMPICILLIN: SIGNIFICANT CHANGE UP
-  AZTREONAM: SIGNIFICANT CHANGE UP
-  CEFAZOLIN: SIGNIFICANT CHANGE UP
-  CEFEPIME: SIGNIFICANT CHANGE UP
-  CEFOXITIN: SIGNIFICANT CHANGE UP
-  CEFTRIAXONE: SIGNIFICANT CHANGE UP
-  CIPROFLOXACIN: SIGNIFICANT CHANGE UP
-  ERTAPENEM: SIGNIFICANT CHANGE UP
-  GENTAMICIN: SIGNIFICANT CHANGE UP
-  IMIPENEM: SIGNIFICANT CHANGE UP
-  LEVOFLOXACIN: SIGNIFICANT CHANGE UP
-  MEROPENEM: SIGNIFICANT CHANGE UP
-  PIPERACILLIN/TAZOBACTAM: SIGNIFICANT CHANGE UP
-  TOBRAMYCIN: SIGNIFICANT CHANGE UP
-  TRIMETHOPRIM/SULFAMETHOXAZOLE: SIGNIFICANT CHANGE UP
ANION GAP SERPL CALC-SCNC: 11 MMOL/L — SIGNIFICANT CHANGE UP (ref 5–17)
BUN SERPL-MCNC: 24 MG/DL — HIGH (ref 7–23)
CALCIUM SERPL-MCNC: 8.4 MG/DL — LOW (ref 8.5–10.1)
CHLORIDE SERPL-SCNC: 112 MMOL/L — HIGH (ref 96–108)
CO2 SERPL-SCNC: 18 MMOL/L — LOW (ref 22–31)
CREAT SERPL-MCNC: 1.24 MG/DL — SIGNIFICANT CHANGE UP (ref 0.5–1.3)
CULTURE RESULTS: SIGNIFICANT CHANGE UP
GLUCOSE SERPL-MCNC: 94 MG/DL — SIGNIFICANT CHANGE UP (ref 70–99)
HCT VFR BLD CALC: 29.4 % — LOW (ref 34.5–45)
HGB BLD-MCNC: 9.4 G/DL — LOW (ref 11.5–15.5)
MAGNESIUM SERPL-MCNC: 1.6 MG/DL — SIGNIFICANT CHANGE UP (ref 1.6–2.6)
MCHC RBC-ENTMCNC: 30.3 PG — SIGNIFICANT CHANGE UP (ref 27–34)
MCHC RBC-ENTMCNC: 32 GM/DL — SIGNIFICANT CHANGE UP (ref 32–36)
MCV RBC AUTO: 94.8 FL — SIGNIFICANT CHANGE UP (ref 80–100)
METHOD TYPE: SIGNIFICANT CHANGE UP
NRBC # BLD: 0 /100 WBCS — SIGNIFICANT CHANGE UP (ref 0–0)
ORGANISM # SPEC MICROSCOPIC CNT: SIGNIFICANT CHANGE UP
PHOSPHATE SERPL-MCNC: 1.5 MG/DL — LOW (ref 2.5–4.5)
PLATELET # BLD AUTO: 16 K/UL — CRITICAL LOW (ref 150–400)
POTASSIUM SERPL-MCNC: 3.4 MMOL/L — LOW (ref 3.5–5.3)
POTASSIUM SERPL-SCNC: 3.4 MMOL/L — LOW (ref 3.5–5.3)
RBC # BLD: 3.1 M/UL — LOW (ref 3.8–5.2)
RBC # FLD: 15.9 % — HIGH (ref 10.3–14.5)
SODIUM SERPL-SCNC: 141 MMOL/L — SIGNIFICANT CHANGE UP (ref 135–145)
SPECIMEN SOURCE: SIGNIFICANT CHANGE UP
WBC # BLD: 0.61 K/UL — CRITICAL LOW (ref 3.8–10.5)
WBC # FLD AUTO: 0.61 K/UL — CRITICAL LOW (ref 3.8–10.5)

## 2018-05-07 PROCEDURE — 74176 CT ABD & PELVIS W/O CONTRAST: CPT | Mod: 26

## 2018-05-07 PROCEDURE — 99223 1ST HOSP IP/OBS HIGH 75: CPT

## 2018-05-07 PROCEDURE — 93306 TTE W/DOPPLER COMPLETE: CPT | Mod: 26

## 2018-05-07 RX ORDER — SODIUM,POTASSIUM PHOSPHATES 278-250MG
1 POWDER IN PACKET (EA) ORAL
Qty: 0 | Refills: 0 | Status: COMPLETED | OUTPATIENT
Start: 2018-05-07 | End: 2018-05-07

## 2018-05-07 RX ORDER — MAGNESIUM OXIDE 400 MG ORAL TABLET 241.3 MG
400 TABLET ORAL
Qty: 0 | Refills: 0 | Status: COMPLETED | OUTPATIENT
Start: 2018-05-07 | End: 2018-05-07

## 2018-05-07 RX ORDER — POTASSIUM CHLORIDE 20 MEQ
20 PACKET (EA) ORAL ONCE
Qty: 0 | Refills: 0 | Status: COMPLETED | OUTPATIENT
Start: 2018-05-07 | End: 2018-05-07

## 2018-05-07 RX ADMIN — Medication 1 TABLET(S): at 21:45

## 2018-05-07 RX ADMIN — Medication 1 TABLET(S): at 12:16

## 2018-05-07 RX ADMIN — MAGNESIUM OXIDE 400 MG ORAL TABLET 400 MILLIGRAM(S): 241.3 TABLET ORAL at 21:45

## 2018-05-07 RX ADMIN — Medication 125 MILLIGRAM(S): at 21:45

## 2018-05-07 RX ADMIN — Medication 1 TABLET(S): at 17:32

## 2018-05-07 RX ADMIN — Medication 50 MILLIGRAM(S): at 05:22

## 2018-05-07 RX ADMIN — MUPIROCIN 1 APPLICATION(S): 20 OINTMENT TOPICAL at 05:23

## 2018-05-07 RX ADMIN — CEFEPIME 1000 MILLIGRAM(S): 1 INJECTION, POWDER, FOR SOLUTION INTRAMUSCULAR; INTRAVENOUS at 05:22

## 2018-05-07 RX ADMIN — Medication 5 MILLIGRAM(S): at 05:23

## 2018-05-07 RX ADMIN — Medication 100 MILLIGRAM(S): at 21:44

## 2018-05-07 RX ADMIN — DAPTOMYCIN 108 MILLIGRAM(S): 500 INJECTION, POWDER, LYOPHILIZED, FOR SOLUTION INTRAVENOUS at 11:34

## 2018-05-07 RX ADMIN — CEFEPIME 1000 MILLIGRAM(S): 1 INJECTION, POWDER, FOR SOLUTION INTRAMUSCULAR; INTRAVENOUS at 18:07

## 2018-05-07 RX ADMIN — Medication 100 MILLIGRAM(S): at 05:23

## 2018-05-07 RX ADMIN — Medication 100 MILLIGRAM(S): at 13:27

## 2018-05-07 RX ADMIN — Medication 125 MILLIGRAM(S): at 18:06

## 2018-05-07 RX ADMIN — SIMVASTATIN 20 MILLIGRAM(S): 20 TABLET, FILM COATED ORAL at 21:45

## 2018-05-07 RX ADMIN — SODIUM CHLORIDE 50 MILLILITER(S): 9 INJECTION INTRAMUSCULAR; INTRAVENOUS; SUBCUTANEOUS at 10:31

## 2018-05-07 RX ADMIN — Medication 20 MILLIEQUIVALENT(S): at 11:34

## 2018-05-07 RX ADMIN — Medication 125 MILLIGRAM(S): at 05:23

## 2018-05-07 RX ADMIN — Medication 125 MILLIGRAM(S): at 12:16

## 2018-05-07 RX ADMIN — MUPIROCIN 1 APPLICATION(S): 20 OINTMENT TOPICAL at 18:09

## 2018-05-07 RX ADMIN — MAGNESIUM OXIDE 400 MG ORAL TABLET 400 MILLIGRAM(S): 241.3 TABLET ORAL at 21:59

## 2018-05-07 RX ADMIN — Medication 50 MILLIGRAM(S): at 18:07

## 2018-05-07 NOTE — CONSULT NOTE ADULT - PROBLEM SELECTOR RECOMMENDATION 3
Moderate on current with no echocardiographic signs of tamponade and no clinical signs currently. Will continue to monitor with follow up echo on wednesday.  Tachycardia episodes will be evaluated with a holter monitor to prevent her being transferred from  as she is neutropenic currently. Most likely etiology is her cancer. Would not re-initiate Lasix at this time as her BP is stable and clinically she is dehydrated.  Will continue to monitor her BP closely.

## 2018-05-07 NOTE — PROGRESS NOTE ADULT - SUBJECTIVE AND OBJECTIVE BOX
66 y/o F with a PMHx of PCKD w DDRT - off immunotherapy since  due to widespread cancer  , hx of CMV virema, w active basal and squamous skin cancers with recent dx  Merkel cell carcinoma - was on immunotherapy Avelumab and changed to etopiside and carboplatinum on ,, after  PET scan showed lesions in her liver and near her heart.   Since then feeling weak (chemo 1.5 weeks ago), presented to the ED regarding generalized weakness x1.5 weeks. Pt reports a fever (100.8 Tmax) +rash on RLE. Pt notes loose stools x3 days. Pt was constipated recently and given stool softener by oncologist. No cough, CP, SOB, abd pain, urinary complaints. (05 May 2018 03:08)   pt was started on Lasix recently by cardiology - Dr Aguilar for presume CHF with increased SOB and orthopnea - pt states she was feeling better w weigh loss with lasix     CKD 3 Cr ~ 1.6- 1.7 while on lasix      TODAY:  feeling stronger   no drinking a lot   still having diarhea    no cough, no dysuria, no sob or cp , vomting ,        PAST MEDICAL & SURGICAL HISTORY:  MVP (mitral valve prolapse)  Raynaud's disease  Squamous cell carcinoma: multiple extensive lesions of torso-all extremities-face/scalp  Hypertension  Hyperlipidemia  Polycystic kidney disease  Fibroid uterus  H/O lumpectomy: left  History of incisional hernia repair  History of kidney transplant: left  History of Mohs surgery for squamous cell carcinoma of skin: recent left arm procedure-multiple Moh&#x27;s procedure in the past    Home Medications:  amLODIPine 5 mg oral tablet: 1 tab(s) orally once a day (05 May 2018 01:22)  aspirin 81 mg oral tablet: 1 tab(s) orally once a day (05 May 2018 01:22)  Klor-Con 10 oral tablet, extended release: 1 tab(s) orally once a day (05 May 2018 01:22)  Lasix 40 mg oral tablet: 1 tab(s) orally once a day (05 May 2018 01:22)  losartan 100 mg oral tablet: 1 tab(s) orally once a day (05 May 2018 01:22)  predniSONE 5 mg oral tablet: 1 tab(s) orally once a day (05 May 2018 01:22)  simvastatin 20 mg oral tablet: 1 tab(s) orally once a day (at bedtime) (05 May 2018 01:22)    MEDICATIONS  (STANDING):  cefepime  Injectable. 1000 milliGRAM(s) IV Push every 12 hours  DAPTOmycin IVPB 200 milliGRAM(s) IV Intermittent every 24 hours  metoprolol tartrate 50 milliGRAM(s) Oral two times a day  metroNIDAZOLE  IVPB 500 milliGRAM(s) IV Intermittent every 8 hours  mupirocin 2% Ointment 1 Application(s) Topical two times a day  potassium acid phosphate/sodium acid phosphate tablet (K-PHOS No. 2) 1 Tablet(s) Oral four times a day with meals  predniSONE   Tablet 5 milliGRAM(s) Oral daily  simvastatin 20 milliGRAM(s) Oral at bedtime  sodium chloride 0.9%. 1000 milliLiter(s) (50 mL/Hr) IV Continuous <Continuous>  vancomycin    Solution 125 milliGRAM(s) Oral every 6 hours      Allergies    bacitracin (Rash)  Levaquin (Sedation/Somnol; Muscle Pain)  tetracyclines (Other)    Intolerances      REVIEW OF SYSTEMS:    CONSTITUTIONAL: No weakness, fevers or chills  EYES/ENT: No visual changes;  No vertigo or throat pain   NECK: No pain or stiffness  RESPIRATORY: No cough, wheezing, hemoptysis; No shortness of breath  CARDIOVASCULAR: No chest pain or palpitations  GASTROINTESTINAL: No abdominal or epigastric pain. No nausea, vomiting, or hematemesis; ++ diarrhea, No melena or hematochezia.  GENITOURINARY: No dysuria, frequency or hematuria  NEUROLOGICAL: No numbness or weakness  SKIN: No itching, burning, rashes, or lesions   All other review of systems is negative unless indicated above.    Vital Signs Last 24 Hrs  T(C): 37.1 (07 May 2018 04:32), Max: 37.1 (07 May 2018 04:32)  T(F): 98.8 (07 May 2018 04:32), Max: 98.8 (07 May 2018 04:32)  HR: 99 (07 May 2018 04:32) (67 - 120)  BP: 127/55 (07 May 2018 04:32) (120/75 - 136/71)  BP(mean): --  RR: 18 (07 May 2018 04:32) (18 - 18)  SpO2: 94% (07 May 2018 04:32) (93% - 95%)  I&O's Summary    06 May 2018 07:  -  07 May 2018 07:00  --------------------------------------------------------  IN: 950 mL / OUT: 2650 mL / NET: -1700 mL    07 May 2018 07:01  -  07 May 2018 13:52  --------------------------------------------------------  IN: 1000 mL / OUT: 0 mL / NET: 1000 mL        PHYSICAL EXAM:    Constitutional: NAD  HEENT:  EOMI,  MMM  Neck: No LAD, No JVD  Respiratory: CTAB, diminshed AE, no rales   Cardiovascular: S1 and S2  Gastrointestinal: BS+, soft, NT/ND  Extremities: + peripheral edema - trace   Neurological: A/O x 3, no focal deficits  Psychiatric: Normal mood, normal affect  : No Manning  Skin: No rashes  Access: Not applicable    LABS:                                                      9.4    0.61  )-----------( 16       ( 07 May 2018 06:38 )             29.4                         8.8    0.28  )-----------( 19       ( 06 May 2018 06:00 )             26.9     141    |  112    |  24     ----------------------------<  94        07 May 2018 06:38  3.4     |  18     |  1.24     138    |  107    |  38     ----------------------------<  90        05 May 2018 06:30  4.2     |  19     |  1.23     137    |  105    |  46     ----------------------------<  100       04 May 2018 19:58  5.1     |  21     |  1.45     Ca    8.4        07 May 2018 06:38  Ca    8.0        05 May 2018 06:30    Phos  1.5       07 May 2018 06:38    Mg     1.6       07 May 2018 06:38      Color: Yellow / Appearance: Clear / S.010 / pH: x  Gluc: x / Ketone: Negative  / Bili: Negative / Urobili: Negative mg/dL   Blood: x / Protein: 100 mg/dL / Nitrite: Negative   Leuk Esterase: Small / RBC: 6-10 /HPF / WBC 6-10   Sq Epi: x / Non Sq Epi: Moderate / Bacteria: Moderate  Culture - Urine (18 @ 19:58)    Specimen Source: .Urine None    Culture Results:   <10,000 CFU/ml Normal Urogenital frank present        Culture - Blood (18 @ 19:58)    -  Klebsiella pneumoniae: Detec    Gram Stain:   Growth in anaerobic bottle: Gram Negative Rods    Specimen Source: .Blood None    Organism: Blood Culture PCR    Culture Results:   Growth in anaerobic bottle: Gram Negative Rods  "Due to technical problems, Proteus sp. will Not be reported as part of  the BCID panel until further notice"  ***Blood Panel PCR results on this specimen are available  approximately 3 hours after the Gram stain result.***  Gram stain, PCR, and/or culture results may not always  correspond due to difference in methodologies.  ************************************************************  This PCR assay was performed using [employer].  The following targets are tested for: Enterococcus,  vancomycin resistant enterococci, Listeria monocytogenes,  coagulase negative staphylococci, S. aureus,  methicillin resistant S. aureus, Streptococcus agalactiae  (Group B), S. pneumoniae, S. pyogenes (Group A),  Acinetobacter baumannii, Enterobacter cloacae, E. coli,  Klebsiella oxytoca, K. pneumoniae, Proteus sp.,  Serratia marcescens, Haemophilus influenzae,  Neisseria meningitidis, Pseudomonas aeruginosa, Candida  albicans, C. glabrata, C krusei, C parapsilosis,  C. tropicalis and the KPC resistance gene.    Organism Identification: Blood Culture PCR    Method Type: PCR      -  Klebsiella pneumoniae: Detec (18 @ 19:58)          RADIOLOGY & ADDITIONAL STUDIES:    EXAM:  XR CHEST AP OR PA 1V                            PROCEDURE DATE:  2018          INTERPRETATION:  History: Fever.    AP view of the chest was obtained.    No prior studies available for comparison.     Findings:     The lungs are clear. Heart size is mildly enlarged. The visualized   osseous structures are unremarkable. Surgical clips are noted within the   left neck soft tissues    Impression: Mild cardiomegaly.

## 2018-05-07 NOTE — PROVIDER CONTACT NOTE (CRITICAL VALUE NOTIFICATION) - SITUATION
Lab values are similar to yesterday's lab results, will inform assigned hospitalist ASAP
dr Avelar aware and dr delaney aware.

## 2018-05-07 NOTE — PROGRESS NOTE ADULT - ASSESSMENT
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 Fever.  Sepsis. Neutropenia  - likely GI source    - Neutropenic precautions   - CXR neg   - UA+,  UCX  neg   - BCX: + Klebsiela pneumonia in   anaerobic CX   - C/w IV Abxs as per Dr Erazo: On Dapto and Cefepime, Flagyl added   - On PO VAnco for C.Diff PPxs as pt has past history   -D/w Dr Erazo, as Pt has loose stools will check CT abd/pelvis. R/o C.Diff       3. CKD stage 3  h/o Renal transplant  Renal Fx is stable   Continue prednisone  Avoid nephrotoxic agents   Renal eval     4. HTN  Monitor BP, stable   C/w amlodipine     5. Merkel cell carcinoma. Pancytopenia   On Chemo: Carboplastin ans Etoposide   Pancytopenia likely due to chemo  Had Neulasta about 10 days ago   Plts trending down, no signs of bleeding, monitor closely   Transfuse Plts if <10K or signs of any bleeding   Off  ASA   Monitor cbc daily   D/w Pts oncologist Dr Francois, agrees with management, does not advise for Neupogen at this moment     6. DVT PPXs: Cannot give chemical Anticoagulation due to very low platelet.  Cannot SCD either due to extensive lesions in both legs.

## 2018-05-07 NOTE — PROGRESS NOTE ADULT - ASSESSMENT
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 Notes a history of cdiff as well and had a loose bowel movement. No rectal pain.  1. Patient admitted with neutropenic fever, multiple skin lesions, many look superinfected and patient notes history of colonization with MRSA, also history of Cdiff in past  - found to have Klebsiella pneumoniae in blood cultures; most likely bacterial translocation from bowel secondary to neutropenia  - had stopped her immunosuppression for her kidney transplant a few weeks ago as well  - follow up cultures   - iv hydration and supportive care   - serial cbc and monitor temperature   - day #3 daptomycin and cefepime  - day #2 iv flagyl as well given neutropenia  - day #3 po vancomycin to prevent cdiff colitis  - contact precautions  - will repeat blood cultures in am  2. other issues:  Merkel cell carcinoma (chemo 1.5 weeks ago), HLD, HTN, s/p kidney transplant (17 years ago, off immunotherapy for last 6 weeks)   - per medicine

## 2018-05-07 NOTE — PROGRESS NOTE ADULT - ASSESSMENT
68 y/o F with a PMHx of HTN, PCKD w  CKD 3  Cr ~ 1.5 - 1.6 w hx of renal transplant off all immunosupressants since March due to widespread Merkel cell carcinoma on PET Scan now s/p chemo 1.5 weeks ago), HLD, HTN, admitted for:     Fever /Sepsis/ Neutropenia  - Klebsiella + in blood - ? Gi source   w Neutropenic precautions    - await Abd Ct   - CXR neg  w- UA+,  UCX  neg   - IV Abx as per Dr Erazo : On Daptomycin and Cefepime  - po vanco - for hx of cdiff per ID     CKD 3 - Cr below baseline due to IVF   renal transplant - not on tacro or rapa  immunosuppression due to active metastatic cancer    continue prednisone for minimal therapy      - hx of plueral effusions/SOB   - holding  po lasix home dose    - gentle IVF due to diarrhea for now    - no NSAID's   - hold ACE or ARB      HTN  - Monitor BP, stable   - c/w metoprolol and hold amlodipne  for now - keep sbp > 110   - hold losartan for now (  hx of proteinuria)     Hypokalemia and Hypophos - due to diarrhea    - replete with oral supplements as needed     Merkel cell carcinoma. Pancytopenia     - Chemo: Carboplastin and Etoposide    - medicine team has been in contact with [facility] - to transfuse Plts if <10K or signs of any bleeding     Thank you for the courtesy of this consult. We will follow this patient with you.   Management is subject to change if new information becomes available or patient condition changes.

## 2018-05-07 NOTE — PROGRESS NOTE ADULT - SUBJECTIVE AND OBJECTIVE BOX
Patient is a 67y old  Female who presents with a chief complaint of Sent in by Dr. carole Roca for fever (05 May 2018 03:18)    Date of service: 05-07-18 @ 10:33  Patient had rapid heart rate last nite, loose stools  ROS: no fever or chills; denies dizziness, no HA, no SOB or cough, no abdominal pain,  constipation; no dysuria, no urinary frequency, no legs pain, no rashes    MEDICATIONS  (STANDING):  cefepime  Injectable. 1000 milliGRAM(s) IV Push every 12 hours  DAPTOmycin IVPB 200 milliGRAM(s) IV Intermittent every 24 hours  metoprolol tartrate 50 milliGRAM(s) Oral two times a day  metroNIDAZOLE  IVPB 500 milliGRAM(s) IV Intermittent every 8 hours  mupirocin 2% Ointment 1 Application(s) Topical two times a day  predniSONE   Tablet 5 milliGRAM(s) Oral daily  simvastatin 20 milliGRAM(s) Oral at bedtime  sodium chloride 0.9%. 1000 milliLiter(s) (50 mL/Hr) IV Continuous <Continuous>  vancomycin    Solution 125 milliGRAM(s) Oral every 6 hours    MEDICATIONS  (PRN):  acetaminophen   Tablet 650 milliGRAM(s) Oral every 6 hours PRN For Temp greater than 38 C (100.4 F)      Vital Signs Last 24 Hrs  T(C): 37.1 (07 May 2018 04:32), Max: 37.1 (07 May 2018 04:32)  T(F): 98.8 (07 May 2018 04:32), Max: 98.8 (07 May 2018 04:32)  HR: 99 (07 May 2018 04:32) (62 - 120)  BP: 127/55 (07 May 2018 04:32) (120/75 - 136/71)  BP(mean): --  RR: 18 (07 May 2018 04:32) (17 - 18)  SpO2: 94% (07 May 2018 04:32) (93% - 95%)    Physical Exam:    Physical Exam:    PE:    Constitutional: frail looking  HEENT: NC/AT, EOMI, PERRLA, conjunctivae clear; ears and nose atraumatic; pharynx clear  Neck: supple; thyroid not palpable  Respiratory: respiratory effort normal; clear to auscultation  Cardiovascular: S1S2 irregular, 2/6 murmur left sternal border  Abdomen: soft, not tender, not distended, positive BS; no liver or spleen organomegaly  Genitourinary: no suprapubic tenderness  Musculoskeletal: no muscle tenderness, no joint swelling or tenderness; left upper extremity thrill at radial artery  Neurological/ Psychiatric: AxOx3, judgement and insight normal;  moving all extremities  Skin: multiple palpable lesions, hyperkeratotic lesions in various stages of skin breakdown with surrounding erythema; some draining white fluid on lower and upper extremities    Labs: all available labs reviewed             Labs:                  Labs:                        9.4    0.61  )-----------( 16       ( 07 May 2018 06:38 )             29.4     05-07    141  |  112<H>  |  24<H>  ----------------------------<  94  3.4<L>   |  18<L>  |  1.24    Ca    8.4<L>      07 May 2018 06:38  Phos  1.5     05-07  Mg     1.6     05-07             Cultures:       Culture - Urine (collected 05-04-18 @ 19:58)  Source: .Urine None  Final Report (05-05-18 @ 23:16):    <10,000 CFU/ml Normal Urogenital frank present    Culture - Blood (collected 05-04-18 @ 19:58)  Source: .Blood None  Gram Stain (05-05-18 @ 11:49):    Growth in anaerobic bottle: Gram Negative Rods  Final Report (05-07-18 @ 10:31):    Growth in anaerobic bottle: Klebsiella pneumoniae    "Due to technical problems, Proteus sp. will Not be reported as part of    the BCID panel until further notice"    ***Blood Panel PCR results on this specimen are available    approximately 3 hours afterthe Gram stain result.***    Gram stain, PCR, and/or culture results may not always    correspond due to difference in methodologies.    ************************************************************    This PCR assay was performed using [employer].    The following targets are tested for: Enterococcus,    vancomycin resistant enterococci, Listeria monocytogenes,    coagulase negative staphylococci, S. aureus,    methicillin resistant S. aureus, Streptococcus agalactiae    (Group B), S. pneumoniae, S. pyogenes (Group A),    Acinetobacter baumannii, Enterobacter cloacae, E. coli,    Klebsiella oxytoca, K. pneumoniae, Proteus sp.,    Serratia marcescens, Haemophilus influenzae,    Neisseria meningitidis, Pseudomonas aeruginosa, Candida    albicans, C. glabrata, C krusei, C parapsilosis,    C. tropicalis and the KPC resistance gene.  Organism: Blood Culture PCR  Klebsiella pneumoniae (05-07-18 @ 10:31)  Organism: Klebsiella pneumoniae (05-07-18 @ 10:31)      -  Amikacin: S <=8      -  Ampicillin: R >16 These ampicillin results predict results for amoxicillin      -  Ampicillin/Sulbactam: S 8/4      -  Aztreonam: S <=4      -  Cefazolin: S <=2      -  Cefepime: S <=2      -  Cefoxitin: I 16      -  Ceftriaxone: S <=1 Enterobacter, Citrobacter, and Serratia may develop resistance during prolonged therapy      -  Ciprofloxacin: S <=0.5      -  Ertapenem: S <=0.5      -  Gentamicin: S <=1      -  Imipenem: S <=1      -  Levofloxacin: S <=1      -  Meropenem: S <=1      -  Piperacillin/Tazobactam: S <=8      -  Tobramycin: S <=2      -  Trimethoprim/Sulfamethoxazole: S <=0.5/9.5      Method Type: TOMY  Organism: Blood Culture PCR (05-07-18 @ 10:31)      -  Klebsiella pneumoniae: Detec      Method Type: PCR    Culture - Blood (collected 05-04-18 @ 19:58)  Source: .Blood None  Preliminary Report (05-06-18 @ 01:02):    No growth to date.                  Radiology: all available radiological tests reviewed    Advanced directives addressed: full resuscitation

## 2018-05-07 NOTE — PROGRESS NOTE ADULT - SUBJECTIVE AND OBJECTIVE BOX
CC: Sent in by Dr. carole Roca for fever (05 May 2018 03:18)    HPI:  66 y/o F with a PMHx of Merkel cell carcinoma (chemo 1.5 weeks ago), HLD, HTN, s/p kidney transplant (17 years ago, off immunotherapy for last 6 weeks) presented to the ED regarding generalized weakness x1.5 weeks. Pt reports a fever (100.8 Tmax) this afternoon. +rash on RLE. Pt notes loose stools x3 days. Pt was constipated recently and given stool softener by oncologist. No cough, CP, SOB, abd pain, urinary complaints. (05 May 2018 03:08)    INTERVAL HPI/ OVERNIGHT EVENTS:  Pt was seen and examined,  OOB to chair, no  new complains. Denies CP or SOB. No fevers. results and POC discussed         REVIEW OF SYSTEMS:  All other review of systems is negative unless indicated above.      PHYSICAL EXAM:  General: Well developed; malnourished; in no acute distress  Eyes: PERRLA, EOMI; conjunctiva and sclera clear  Head: Normocephalic; atraumatic  ENMT: No nasal discharge; airway clear  Neck: Supple; non tender; no masses  Respiratory: Good air entry, No wheezes, rales or rhonchi  Cardiovascular: Regular rate and rhythm. S1 and S2 Normal; No murmurs  Gastrointestinal: Soft, non-tender, mildly distended; Normal bowel sounds  Genitourinary: No costovertebral angle tenderness  Extremities: Normal range of motion, No  edema. Multiple LE skin lesions, some with scabbing and surrounding erythema, no purulent discharge noted   Vascular: Peripheral pulses palpable 2+ bilaterally  Neurological: Alert and oriented x4, non focal   Skin: Warm and dry. No acute rash  Lymph Nodes: No acute cervical adenopathy  Musculoskeletal: Normal gait, tone, without deformities  Psychiatric: Cooperative and appropriate        LABS:                         9.4    0.61  )-----------( 16       ( 07 May 2018 06:38 )             29.4     05-07    141  |  112<H>  |  24<H>  ----------------------------<  94  3.4<L>   |  18<L>  |  1.24    Ca    8.4<L>      07 May 2018 06:38  Phos  1.5     05-  Mg     1.6     05-07                              8.8    0.28  )-----------( 19       ( 06 May 2018 06:00 )             26.9     05-05    138  |  107  |  38<H>  ----------------------------<  90  4.2   |  19<L>  |  1.23    Ca    8.0<L>      05 May 2018 06:30      Urinalysis Basic - ( 04 May 2018 19:58 )    Color: Yellow / Appearance: Clear / S.010 / pH: x  Gluc: x / Ketone: Negative  / Bili: Negative / Urobili: Negative mg/dL   Blood: x / Protein: 100 mg/dL / Nitrite: Negative   Leuk Esterase: Small / RBC: 6-10 /HPF / WBC 6-10   Sq Epi: x / Non Sq Epi: Moderate / Bacteria: Moderate    Culture - Urine (18 @ 19:58)    Specimen Source: .Urine None    Culture Results:   <10,000 CFU/ml Normal Urogenital frank present      Culture - Blood (18 @ 19:58)    -  Klebsiella pneumoniae: Detec    Gram Stain:   Growth in anaerobic bottle: Gram Negative Rods    -  Amikacin: S <=8    -  Ampicillin: R >16 These ampicillin results predict results for amoxicillin    -  Ampicillin/Sulbactam: S 8/4    -  Aztreonam: S <=4    -  Cefazolin: S <=2    -  Cefepime: S <=2    -  Cefoxitin: I 16    -  Ceftriaxone: S <=1 Enterobacter, Citrobacter, and Serratia may develop resistance during prolonged therapy    -  Ciprofloxacin: S <=0.5    -  Ertapenem: S <=0.5    -  Gentamicin: S <=1    -  Imipenem: S <=1    -  Levofloxacin: S <=1    -  Meropenem: S <=1    -  Piperacillin/Tazobactam: S <=8    -  Tobramycin: S <=2    -  Trimethoprim/Sulfamethoxazole: S <=0.5/9.5    Specimen Source: .Blood None    Organism: Blood Culture PCR    Organism: Klebsiella pneumoniae    Culture Results:   Growth in anaerobic bottle: Klebsiella pneumoniae        MEDICATIONS  (STANDING):  amLODIPine   Tablet 5 milliGRAM(s) Oral daily  cefepime  Injectable. 1000 milliGRAM(s) IV Push every 12 hours  DAPTOmycin IVPB 200 milliGRAM(s) IV Intermittent every 24 hours  metoprolol tartrate 50 milliGRAM(s) Oral two times a day  metroNIDAZOLE  IVPB 500 milliGRAM(s) IV Intermittent every 8 hours  predniSONE   Tablet 5 milliGRAM(s) Oral daily  simvastatin 20 milliGRAM(s) Oral at bedtime  sodium chloride 0.9%. 1000 milliLiter(s) (125 mL/Hr) IV Continuous <Continuous>  vancomycin    Solution 125 milliGRAM(s) Oral every 6 hours    MEDICATIONS  (PRN):  acetaminophen   Tablet 650 milliGRAM(s) Oral every 6 hours PRN For Temp greater than 38 C (100.4 F)    RADIOLOGY & ADDITIONAL TESTS:    EXAM:  XR CHEST AP OR PA 1V                        PROCEDURE DATE:  2018    Findings:     The lungs are clear. Heart size is mildly enlarged. The visualized   osseous structures are unremarkable. Surgical clips are noted within the   left neck soft tissues    Impression: Mild cardiomegaly.      EXAM:  US DPLX LWR EXT VEINS COMPL BI                        PROCEDURE DATE:  2018        There is no sonographic evidence for deep vein thrombosis in the in the   common femoral, femoral, and popliteal veins of the right and left leg.   The veins are patent compressible with normal venous waveforms and   augmentation. Calf veins are patent to the level of the proximal   posterior tibial vein.    No abnormal fluid collection is noted in the popliteal region.    IMPRESSION: Normal lower extremity venous Doppler of the femoral and   popliteal veins of the right and left leg to the level of the proximal   posterior tibial vein.

## 2018-05-08 LAB
ABO RH CONFIRMATION: SIGNIFICANT CHANGE UP
ANION GAP SERPL CALC-SCNC: 10 MMOL/L — SIGNIFICANT CHANGE UP (ref 5–17)
BLD GP AB SCN SERPL QL: SIGNIFICANT CHANGE UP
BUN SERPL-MCNC: 26 MG/DL — HIGH (ref 7–23)
C DIFF BY PCR RESULT: SIGNIFICANT CHANGE UP
C DIFF TOX GENS STL QL NAA+PROBE: SIGNIFICANT CHANGE UP
CALCIUM SERPL-MCNC: 8.2 MG/DL — LOW (ref 8.5–10.1)
CHLORIDE SERPL-SCNC: 112 MMOL/L — HIGH (ref 96–108)
CO2 SERPL-SCNC: 19 MMOL/L — LOW (ref 22–31)
CREAT SERPL-MCNC: 1.21 MG/DL — SIGNIFICANT CHANGE UP (ref 0.5–1.3)
GLUCOSE SERPL-MCNC: 88 MG/DL — SIGNIFICANT CHANGE UP (ref 70–99)
HCT VFR BLD CALC: 26.3 % — LOW (ref 34.5–45)
HCT VFR BLD CALC: 26.9 % — LOW (ref 34.5–45)
HCT VFR BLD CALC: 28.3 % — LOW (ref 34.5–45)
HGB BLD-MCNC: 8.7 G/DL — LOW (ref 11.5–15.5)
HGB BLD-MCNC: 8.8 G/DL — LOW (ref 11.5–15.5)
HGB BLD-MCNC: 9.1 G/DL — LOW (ref 11.5–15.5)
MCHC RBC-ENTMCNC: 30.7 PG — SIGNIFICANT CHANGE UP (ref 27–34)
MCHC RBC-ENTMCNC: 31 PG — SIGNIFICANT CHANGE UP (ref 27–34)
MCHC RBC-ENTMCNC: 31.3 PG — SIGNIFICANT CHANGE UP (ref 27–34)
MCHC RBC-ENTMCNC: 32.2 GM/DL — SIGNIFICANT CHANGE UP (ref 32–36)
MCHC RBC-ENTMCNC: 32.7 GM/DL — SIGNIFICANT CHANGE UP (ref 32–36)
MCHC RBC-ENTMCNC: 33.1 GM/DL — SIGNIFICANT CHANGE UP (ref 32–36)
MCV RBC AUTO: 94.6 FL — SIGNIFICANT CHANGE UP (ref 80–100)
MCV RBC AUTO: 94.7 FL — SIGNIFICANT CHANGE UP (ref 80–100)
MCV RBC AUTO: 95.6 FL — SIGNIFICANT CHANGE UP (ref 80–100)
NRBC # BLD: 0 /100 WBCS — SIGNIFICANT CHANGE UP (ref 0–0)
PLATELET # BLD AUTO: 35 K/UL — LOW (ref 150–400)
PLATELET # BLD AUTO: 37 K/UL — LOW (ref 150–400)
PLATELET # BLD AUTO: 7 K/UL — CRITICAL LOW (ref 150–400)
POTASSIUM SERPL-MCNC: 3.6 MMOL/L — SIGNIFICANT CHANGE UP (ref 3.5–5.3)
POTASSIUM SERPL-SCNC: 3.6 MMOL/L — SIGNIFICANT CHANGE UP (ref 3.5–5.3)
RBC # BLD: 2.78 M/UL — LOW (ref 3.8–5.2)
RBC # BLD: 2.84 M/UL — LOW (ref 3.8–5.2)
RBC # BLD: 2.96 M/UL — LOW (ref 3.8–5.2)
RBC # FLD: 15.8 % — HIGH (ref 10.3–14.5)
RBC # FLD: 15.8 % — HIGH (ref 10.3–14.5)
RBC # FLD: 15.9 % — HIGH (ref 10.3–14.5)
SODIUM SERPL-SCNC: 141 MMOL/L — SIGNIFICANT CHANGE UP (ref 135–145)
TYPE + AB SCN PNL BLD: SIGNIFICANT CHANGE UP
WBC # BLD: 0.98 K/UL — CRITICAL LOW (ref 3.8–10.5)
WBC # BLD: 1.84 K/UL — LOW (ref 3.8–10.5)
WBC # BLD: 1.97 K/UL — LOW (ref 3.8–10.5)
WBC # FLD AUTO: 0.98 K/UL — CRITICAL LOW (ref 3.8–10.5)
WBC # FLD AUTO: 1.84 K/UL — LOW (ref 3.8–10.5)
WBC # FLD AUTO: 1.97 K/UL — LOW (ref 3.8–10.5)

## 2018-05-08 PROCEDURE — 93308 TTE F-UP OR LMTD: CPT | Mod: 26

## 2018-05-08 RX ADMIN — Medication 50 MILLIGRAM(S): at 18:06

## 2018-05-08 RX ADMIN — Medication 100 MILLIGRAM(S): at 23:26

## 2018-05-08 RX ADMIN — Medication 125 MILLIGRAM(S): at 23:26

## 2018-05-08 RX ADMIN — Medication 5 MILLIGRAM(S): at 05:30

## 2018-05-08 RX ADMIN — Medication 100 MILLIGRAM(S): at 13:08

## 2018-05-08 RX ADMIN — SODIUM CHLORIDE 50 MILLILITER(S): 9 INJECTION INTRAMUSCULAR; INTRAVENOUS; SUBCUTANEOUS at 09:51

## 2018-05-08 RX ADMIN — CEFEPIME 1000 MILLIGRAM(S): 1 INJECTION, POWDER, FOR SOLUTION INTRAMUSCULAR; INTRAVENOUS at 18:06

## 2018-05-08 RX ADMIN — CEFEPIME 1000 MILLIGRAM(S): 1 INJECTION, POWDER, FOR SOLUTION INTRAMUSCULAR; INTRAVENOUS at 05:30

## 2018-05-08 RX ADMIN — Medication 50 MILLIGRAM(S): at 05:30

## 2018-05-08 RX ADMIN — MUPIROCIN 1 APPLICATION(S): 20 OINTMENT TOPICAL at 18:08

## 2018-05-08 RX ADMIN — Medication 125 MILLIGRAM(S): at 11:21

## 2018-05-08 RX ADMIN — SIMVASTATIN 20 MILLIGRAM(S): 20 TABLET, FILM COATED ORAL at 23:27

## 2018-05-08 RX ADMIN — Medication 100 MILLIGRAM(S): at 05:30

## 2018-05-08 RX ADMIN — Medication 125 MILLIGRAM(S): at 18:06

## 2018-05-08 RX ADMIN — DAPTOMYCIN 108 MILLIGRAM(S): 500 INJECTION, POWDER, LYOPHILIZED, FOR SOLUTION INTRAVENOUS at 11:21

## 2018-05-08 RX ADMIN — Medication 125 MILLIGRAM(S): at 05:30

## 2018-05-08 RX ADMIN — MUPIROCIN 1 APPLICATION(S): 20 OINTMENT TOPICAL at 05:31

## 2018-05-08 NOTE — PROGRESS NOTE ADULT - ASSESSMENT
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 Fever.  Sepsis. Neutropenia  - likely GI source    - Neutropenic precautions   - CXR neg   - UA+,  UCX  neg   - BCX: + Klebsiela pneumonia in   anaerobic CX   - C/w IV Abxs as per Dr Erazo: On Dapto and Cefepime, Flagyl added   - On PO VAnco for C.Diff PPxs as pt has past history   -D/w Dr Erazo, as Pt has loose stools will check CT abd/pelvis. R/o C.Diff       3. CKD stage 3  h/o Renal transplant  Renal Fx is stable   Continue prednisone  Avoid nephrotoxic agents   Renal eval     4. HTN  Monitor BP, stable   C/w amlodipine     5. Merkel cell carcinoma. Pancytopenia   On Chemo: Carboplastin ans Etoposide   Pancytopenia likely due to chemo  Had Neulasta about 10 days ago   Plts trending down, no signs of bleeding, monitor closely   Transfuse Plts if <10K or signs of any bleeding   Off  ASA   Monitor cbc daily   D/w Pts oncologist Dr Francois, agrees with management, does not advise for Neupogen at this moment     6. DVT PPXs: Cannot give chemical Anticoagulation due to very low platelet.  Cannot SCD either due to extensive lesions in both legs. 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 Fever.  Sepsis. Neutropenia  - found to have typhlitis  on CT abd/pelvis   - Neutropenic precautions   - CXR neg   - UA+,  UCX  neg   - BCX: + Klebsiela pneumonia in   anaerobic CX   - C/w IV Abxs as per ID : On Dapto and Cefepime, Flagyl   - On PO VAnco for C.Diff PPxs as pt has past history   - Stool Cdiff negative         2.  CKD stage 3  h/o Renal transplant  Renal Fx is stable   Continue prednisone  Avoid nephrotoxic agents   D/w Renal     4. HTN  Monitor BP, stable   C/w amlodipine     5. Merkel cell carcinoma., recently Dz with mediastinal LAD and liver mets. Pancytopenia   On Chemo: Carboplatin and Etoposide   Pancytopenia likely due to chemo  Had Neulasta about 10 days ago   Plts  at 7K this am , no signs of bleeding, monitor closely   Transfuse Plts 1U   Off  ASA   Monitor cbc daily   CT abd/pelvis results noted for 2.8 cm circumferential small bowel wall thickening, as per radiology possible  primary neoplasm Pt will need to f/u with GI and her  oncologist for further evaluation when stable   D/w DR Hall    6. Moderate pericardial effusion  asymptomatic   as per Pt had it since 2011 and followed by Cardio  at [facility]  ECHO was repeat today and looks stable  Pt hemodynamically stable, asymptomatic   D/w Cardio       7.  DVT PPXs: Cannot give chemical Anticoagulation due to very low platelet.  Cannot SCD either due to extensive lesions in both legs.

## 2018-05-08 NOTE — PROGRESS NOTE ADULT - SUBJECTIVE AND OBJECTIVE BOX
Patient is a 67y Female who reports no complaints overnight. no chest pain or SOB    REVIEW OF SYSTEMS:    CONSTITUTIONAL: No weakness, fevers or chills  RESPIRATORY: No cough, wheezing, hemoptysis; No shortness of breath  CARDIOVASCULAR: No chest pain or palpitations  GENITOURINARY: No dysuria, frequency or hematuria  All other review of systems is negative unless indicated above.    MEDICATIONS  (STANDING):  cefepime  Injectable. 1000 milliGRAM(s) IV Push every 12 hours  DAPTOmycin IVPB 200 milliGRAM(s) IV Intermittent every 24 hours  metoprolol tartrate 50 milliGRAM(s) Oral two times a day  metroNIDAZOLE  IVPB 500 milliGRAM(s) IV Intermittent every 8 hours  mupirocin 2% Ointment 1 Application(s) Topical two times a day  predniSONE   Tablet 5 milliGRAM(s) Oral daily  simvastatin 20 milliGRAM(s) Oral at bedtime  sodium chloride 0.9%. 1000 milliLiter(s) (50 mL/Hr) IV Continuous <Continuous>  vancomycin    Solution 125 milliGRAM(s) Oral every 6 hours    MEDICATIONS  (PRN):  acetaminophen   Tablet 650 milliGRAM(s) Oral every 6 hours PRN For Temp greater than 38 C (100.4 F)        T(C): , Max: 37.2 (05-07-18 @ 21:35)  T(F): , Max: 98.9 (05-07-18 @ 21:35)  HR: 63 (05-08-18 @ 04:40)  BP: 129/64 (05-08-18 @ 04:40)  BP(mean): --  RR: 15 (05-08-18 @ 04:40)  SpO2: 94% (05-08-18 @ 04:40)  Wt(kg): --    05-07 @ 07:01  -  05-08 @ 07:00  --------------------------------------------------------  IN: 1000 mL / OUT: 0 mL / NET: 1000 mL    05-08 @ 07:01  -  05-08 @ 12:23  --------------------------------------------------------  IN: 1360 mL / OUT: 0 mL / NET: 1360 mL      PHYSICAL EXAM:    Constitutional: NAD  HEENT: PERRLA, EOMI,  MMM  Neck: No LAD, No JVD  Respiratory: good aeration b/l  Cardiovascular: S1 and S2, RRR  Gastrointestinal: BS+, soft, NT/ND  Extremities: No peripheral edema  Neurological: A/O x 3, no focal deficits  Psychiatric: Normal mood, normal affect  : No Manning  Skin: No rashes  Access: Not applicable    LABS:                        9.1    0.98  )-----------( 7        ( 08 May 2018 07:02 )             28.3     08 May 2018 07:02    141    |  112    |  26     ----------------------------<  88     3.6     |  19     |  1.21   07 May 2018 06:38    141    |  112    |  24     ----------------------------<  94     3.4     |  18     |  1.24   05 May 2018 06:30    138    |  107    |  38     ----------------------------<  90     4.2     |  19     |  1.23   04 May 2018 19:58    137    |  105    |  46     ----------------------------<  100    5.1     |  21     |  1.45     Ca    8.2        08 May 2018 07:02  Ca    8.4        07 May 2018 06:38  Ca    8.0        05 May 2018 06:30  Ca    8.9        04 May 2018 19:58  Phos  1.5       07 May 2018 06:38  Mg     1.6       07 May 2018 06:38    TPro  6.5    /  Alb  3.0    /  TBili  0.8    /  DBili  x      /  AST  20     /  ALT  15     /  AlkPhos  76     04 May 2018 19:58          Urine Studies:          RADIOLOGY & ADDITIONAL STUDIES:

## 2018-05-08 NOTE — PROGRESS NOTE ADULT - SUBJECTIVE AND OBJECTIVE BOX
CC: Sent in by Dr. carole Roca for fever (05 May 2018 03:18)    HPI:  66 y/o F with a PMHx of Merkel cell carcinoma (chemo 1.5 weeks ago), HLD, HTN, s/p kidney transplant (17 years ago, off immunotherapy for last 6 weeks) presented to the ED regarding generalized weakness x1.5 weeks. Pt reports a fever (100.8 Tmax) this afternoon. +rash on RLE. Pt notes loose stools x3 days. Pt was constipated recently and given stool softener by oncologist. No cough, CP, SOB, abd pain, urinary complaints. (05 May 2018 03:08)    INTERVAL HPI/ OVERNIGHT EVENTS:  Pt was seen and examined,  OOB to chair, no  new complains. Denies CP or SOB. No fevers. results and POC discussed         REVIEW OF SYSTEMS:  All other review of systems is negative unless indicated above.      PHYSICAL EXAM:  General: Well developed; malnourished; in no acute distress  Eyes: PERRLA, EOMI; conjunctiva and sclera clear  Head: Normocephalic; atraumatic  ENMT: No nasal discharge; airway clear  Neck: Supple; non tender; no masses  Respiratory: Good air entry, No wheezes, rales or rhonchi  Cardiovascular: Regular rate and rhythm. S1 and S2 Normal; No murmurs  Gastrointestinal: Soft, non-tender, mildly distended; Normal bowel sounds  Genitourinary: No costovertebral angle tenderness  Extremities: Normal range of motion, No  edema. Multiple LE skin lesions, some with scabbing and surrounding erythema, no purulent discharge noted   Vascular: Peripheral pulses palpable 2+ bilaterally  Neurological: Alert and oriented x4, non focal   Skin: Warm and dry. No acute rash  Lymph Nodes: No acute cervical adenopathy  Musculoskeletal: Normal gait, tone, without deformities  Psychiatric: Cooperative and appropriate        LABS:                         9.4    0.61  )-----------( 16       ( 07 May 2018 06:38 )             29.4     05-07    141  |  112<H>  |  24<H>  ----------------------------<  94  3.4<L>   |  18<L>  |  1.24    Ca    8.4<L>      07 May 2018 06:38  Phos  1.5     05-  Mg     1.6     05-07                              8.8    0.28  )-----------( 19       ( 06 May 2018 06:00 )             26.9     05-05    138  |  107  |  38<H>  ----------------------------<  90  4.2   |  19<L>  |  1.23    Ca    8.0<L>      05 May 2018 06:30      Urinalysis Basic - ( 04 May 2018 19:58 )    Color: Yellow / Appearance: Clear / S.010 / pH: x  Gluc: x / Ketone: Negative  / Bili: Negative / Urobili: Negative mg/dL   Blood: x / Protein: 100 mg/dL / Nitrite: Negative   Leuk Esterase: Small / RBC: 6-10 /HPF / WBC 6-10   Sq Epi: x / Non Sq Epi: Moderate / Bacteria: Moderate    Culture - Urine (18 @ 19:58)    Specimen Source: .Urine None    Culture Results:   <10,000 CFU/ml Normal Urogenital frank present      Culture - Blood (18 @ 19:58)    -  Klebsiella pneumoniae: Detec    Gram Stain:   Growth in anaerobic bottle: Gram Negative Rods    -  Amikacin: S <=8    -  Ampicillin: R >16 These ampicillin results predict results for amoxicillin    -  Ampicillin/Sulbactam: S 8/4    -  Aztreonam: S <=4    -  Cefazolin: S <=2    -  Cefepime: S <=2    -  Cefoxitin: I 16    -  Ceftriaxone: S <=1 Enterobacter, Citrobacter, and Serratia may develop resistance during prolonged therapy    -  Ciprofloxacin: S <=0.5    -  Ertapenem: S <=0.5    -  Gentamicin: S <=1    -  Imipenem: S <=1    -  Levofloxacin: S <=1    -  Meropenem: S <=1    -  Piperacillin/Tazobactam: S <=8    -  Tobramycin: S <=2    -  Trimethoprim/Sulfamethoxazole: S <=0.5/9.5    Specimen Source: .Blood None    Organism: Blood Culture PCR    Organism: Klebsiella pneumoniae    Culture Results:   Growth in anaerobic bottle: Klebsiella pneumoniae        MEDICATIONS  (STANDING):  amLODIPine   Tablet 5 milliGRAM(s) Oral daily  cefepime  Injectable. 1000 milliGRAM(s) IV Push every 12 hours  DAPTOmycin IVPB 200 milliGRAM(s) IV Intermittent every 24 hours  metoprolol tartrate 50 milliGRAM(s) Oral two times a day  metroNIDAZOLE  IVPB 500 milliGRAM(s) IV Intermittent every 8 hours  predniSONE   Tablet 5 milliGRAM(s) Oral daily  simvastatin 20 milliGRAM(s) Oral at bedtime  sodium chloride 0.9%. 1000 milliLiter(s) (125 mL/Hr) IV Continuous <Continuous>  vancomycin    Solution 125 milliGRAM(s) Oral every 6 hours    MEDICATIONS  (PRN):  acetaminophen   Tablet 650 milliGRAM(s) Oral every 6 hours PRN For Temp greater than 38 C (100.4 F)    RADIOLOGY & ADDITIONAL TESTS:    EXAM:  XR CHEST AP OR PA 1V                        PROCEDURE DATE:  2018    Findings:     The lungs are clear. Heart size is mildly enlarged. The visualized   osseous structures are unremarkable. Surgical clips are noted within the   left neck soft tissues    Impression: Mild cardiomegaly.      EXAM:  US DPLX LWR EXT VEINS COMPL BI                        PROCEDURE DATE:  2018        There is no sonographic evidence for deep vein thrombosis in the in the   common femoral, femoral, and popliteal veins of the right and left leg.   The veins are patent compressible with normal venous waveforms and   augmentation. Calf veins are patent to the level of the proximal   posterior tibial vein.    No abnormal fluid collection is noted in the popliteal region.    IMPRESSION: Normal lower extremity venous Doppler of the femoral and   popliteal veins of the right and left leg to the level of the proximal   posterior tibial vein. CC: Sent in by Dr. carole Roca for fever (05 May 2018 03:18)    HPI:  68 y/o F with a PMHx of Merkel cell carcinoma (chemo 1.5 weeks ago), HLD, HTN, s/p kidney transplant (17 years ago, off immunotherapy for last 6 weeks) presented to the ED regarding generalized weakness x1.5 weeks. Pt reports a fever (100.8 Tmax) this afternoon. +rash on RLE. Pt notes loose stools x3 days. Pt was constipated recently and given stool softener by oncologist. No cough, CP, SOB, abd pain, urinary complaints. (05 May 2018 03:08)    INTERVAL HPI/ OVERNIGHT EVENTS:  Pt was seen and examined, reports feeling better, not as tired, no fevers or chills, still some loose stools. No abd paon. Results and Plts transfusion discussed. Pt agrees.     REVIEW OF SYSTEMS:  All other review of systems is negative unless indicated above.      PHYSICAL EXAM:  General: Well developed; malnourished; in no acute distress  Eyes: PERRLA, EOMI; conjunctiva and sclera clear  Head: Normocephalic; atraumatic  ENMT: No nasal discharge; airway clear  Neck: Supple; non tender; no masses  Respiratory: Good air entry, No wheezes, rales or rhonchi  Cardiovascular: Regular rate and rhythm. S1 and S2 Normal; No murmurs  Gastrointestinal: Soft, non-tender, not  distended; Normal bowel sounds  Genitourinary: No costovertebral angle tenderness  Extremities: Normal range of motion, No  edema. Multiple LE skin lesions, RLE with dressing, intact   Vascular: Peripheral pulses palpable 2+ bilaterally  Neurological: Alert and oriented x4, non focal   Skin: Warm and dry. No acute rash  Lymph Nodes: No acute cervical adenopathy  Musculoskeletal: Normal gait, tone, without deformities  Psychiatric: Cooperative and appropriate        LABS:                         9.1    0.98  )-----------( 7        ( 08 May 2018 07:02 )             28.3     05-08    141  |  112<H>  |  26<H>  ----------------------------<  88  3.6   |  19<L>  |  1.21                                  9.4    0.61  )-----------( 16       ( 07 May 2018 06:38 )             29.4     05-07    141  |  112<H>  |  24<H>  ----------------------------<  94  3.4<L>   |  18<L>  |  1.24    Ca    8.4<L>      07 May 2018 06:38  Phos  1.5     05-07  Mg     1.6                                   8.8    0.28  )-----------( 19       ( 06 May 2018 06:00 )             26.9     05-05    138  |  107  |  38<H>  ----------------------------<  90  4.2   |  19<L>  |  1.23    Ca    8.0<L>      05 May 2018 06:30      Urinalysis Basic - ( 04 May 2018 19:58 )    Color: Yellow / Appearance: Clear / S.010 / pH: x  Gluc: x / Ketone: Negative  / Bili: Negative / Urobili: Negative mg/dL   Blood: x / Protein: 100 mg/dL / Nitrite: Negative   Leuk Esterase: Small / RBC: 6-10 /HPF / WBC 6-10   Sq Epi: x / Non Sq Epi: Moderate / Bacteria: Moderate    Culture - Urine (18 @ 19:58)    Specimen Source: .Urine None    Culture Results:   <10,000 CFU/ml Normal Urogenital frank present      Culture - Blood (18 @ 19:58)    -  Klebsiella pneumoniae: Detec    Gram Stain:   Growth in anaerobic bottle: Gram Negative Rods    -  Amikacin: S <=8    -  Ampicillin: R >16 These ampicillin results predict results for amoxicillin    -  Ampicillin/Sulbactam: S 8/4    -  Aztreonam: S <=4    -  Cefazolin: S <=2    -  Cefepime: S <=2    -  Cefoxitin: I 16    -  Ceftriaxone: S <=1 Enterobacter, Citrobacter, and Serratia may develop resistance during prolonged therapy    -  Ciprofloxacin: S <=0.5    -  Ertapenem: S <=0.5    -  Gentamicin: S <=1    -  Imipenem: S <=1    -  Levofloxacin: S <=1    -  Meropenem: S <=1    -  Piperacillin/Tazobactam: S <=8    -  Tobramycin: S <=2    -  Trimethoprim/Sulfamethoxazole: S <=0.5/9.5    Specimen Source: .Blood None    Organism: Blood Culture PCR    Organism: Klebsiella pneumoniae    Culture Results:   Growth in anaerobic bottle: Klebsiella pneumoniae        MEDICATIONS  (STANDING):  amLODIPine   Tablet 5 milliGRAM(s) Oral daily  cefepime  Injectable. 1000 milliGRAM(s) IV Push every 12 hours  DAPTOmycin IVPB 200 milliGRAM(s) IV Intermittent every 24 hours  metoprolol tartrate 50 milliGRAM(s) Oral two times a day  metroNIDAZOLE  IVPB 500 milliGRAM(s) IV Intermittent every 8 hours  predniSONE   Tablet 5 milliGRAM(s) Oral daily  simvastatin 20 milliGRAM(s) Oral at bedtime  sodium chloride 0.9%. 1000 milliLiter(s) (125 mL/Hr) IV Continuous <Continuous>  vancomycin    Solution 125 milliGRAM(s) Oral every 6 hours    MEDICATIONS  (PRN):  acetaminophen   Tablet 650 milliGRAM(s) Oral every 6 hours PRN For Temp greater than 38 C (100.4 F)    RADIOLOGY & ADDITIONAL TESTS:    EXAM:  XR CHEST AP OR PA 1V                        PROCEDURE DATE:  2018    Findings:     The lungs are clear. Heart size is mildly enlarged. The visualized   osseous structures are unremarkable. Surgical clips are noted within the   left neck soft tissues    Impression: Mild cardiomegaly.      EXAM:  US DPLX LWR EXT VEINS COMPL BI                        PROCEDURE DATE:  2018        There is no sonographic evidence for deep vein thrombosis in the in the   common femoral, femoral, and popliteal veins of the right and left leg.   The veins are patent compressible with normal venous waveforms and   augmentation. Calf veins are patent to the level of the proximal   posterior tibial vein.    No abnormal fluid collection is noted in the popliteal region.    IMPRESSION: Normal lower extremity venous Doppler of the femoral and   popliteal veins of the right and left leg to the level of the proximal   posterior tibial vein. CC: Sent in by Dr. Francois  at [place] for fever (05 May 2018 03:18)    HPI:  66 y/o F with a PMHx of Merkel cell carcinoma (chemo 1.5 weeks ago), HLD, HTN, s/p kidney transplant (17 years ago, off immunotherapy for last 6 weeks) presented to the ED regarding generalized weakness x1.5 weeks. Pt reports a fever (100.8 Tmax) this afternoon. +rash on RLE. Pt notes loose stools x3 days. Pt was constipated recently and given stool softener by oncologist. No cough, CP, SOB, abd pain, urinary complaints. (05 May 2018 03:08)    INTERVAL HPI/ OVERNIGHT EVENTS:  Pt was seen and examined, reports feeling better, not as tired, no fevers or chills, still some loose stools. No abd paon. Results and Plts transfusion discussed. Pt agrees.     REVIEW OF SYSTEMS:  All other review of systems is negative unless indicated above.      PHYSICAL EXAM:  General: Well developed; malnourished; in no acute distress  Eyes: PERRLA, EOMI; conjunctiva and sclera clear  Head: Normocephalic; atraumatic  ENMT: No nasal discharge; airway clear  Neck: Supple; non tender; no masses  Respiratory: Good air entry, No wheezes, rales or rhonchi  Cardiovascular: Regular rate and rhythm. S1 and S2 Normal; No murmurs  Gastrointestinal: Soft, non-tender, not  distended; Normal bowel sounds  Genitourinary: No costovertebral angle tenderness  Extremities: Normal range of motion, No  edema. Multiple LE skin lesions, RLE with dressing, intact   Vascular: Peripheral pulses palpable 2+ bilaterally  Neurological: Alert and oriented x4, non focal   Skin: Warm and dry. No acute rash  Lymph Nodes: No acute cervical adenopathy  Musculoskeletal: Normal gait, tone, without deformities  Psychiatric: Cooperative and appropriate        LABS:                         9.1    0.98  )-----------( 7        ( 08 May 2018 07:02 )             28.3     05-08    141  |  112<H>  |  26<H>  ----------------------------<  88  3.6   |  19<L>  |  1.21                            9.4    0.61  )-----------( 16       ( 07 May 2018 06:38 )             29.4     05-07    141  |  112<H>  |  24<H>  ----------------------------<  94  3.4<L>   |  18<L>  |  1.24    Ca    8.4<L>      07 May 2018 06:38  Phos  1.5     05-07  Mg     1.6                                   8.8    0.28  )-----------( 19       ( 06 May 2018 06:00 )             26.9     05-05    138  |  107  |  38<H>  ----------------------------<  90  4.2   |  19<L>  |  1.23    Ca    8.0<L>      05 May 2018 06:30      Urinalysis Basic - ( 04 May 2018 19:58 )    Color: Yellow / Appearance: Clear / S.010 / pH: x  Gluc: x / Ketone: Negative  / Bili: Negative / Urobili: Negative mg/dL   Blood: x / Protein: 100 mg/dL / Nitrite: Negative   Leuk Esterase: Small / RBC: 6-10 /HPF / WBC 6-10   Sq Epi: x / Non Sq Epi: Moderate / Bacteria: Moderate    Culture - Urine (18 @ 19:58)    Specimen Source: .Urine None    Culture Results:   <10,000 CFU/ml Normal Urogenital frank present      Culture - Blood (18 @ 19:58)    -  Klebsiella pneumoniae: Detec    Gram Stain:   Growth in anaerobic bottle: Gram Negative Rods    -  Amikacin: S <=8    -  Ampicillin: R >16 These ampicillin results predict results for amoxicillin    -  Ampicillin/Sulbactam: S 8/4    -  Aztreonam: S <=4    -  Cefazolin: S <=2    -  Cefepime: S <=2    -  Cefoxitin: I 16    -  Ceftriaxone: S <=1 Enterobacter, Citrobacter, and Serratia may develop resistance during prolonged therapy    -  Ciprofloxacin: S <=0.5    -  Ertapenem: S <=0.5    -  Gentamicin: S <=1    -  Imipenem: S <=1    -  Levofloxacin: S <=1    -  Meropenem: S <=1    -  Piperacillin/Tazobactam: S <=8    -  Tobramycin: S <=2    -  Trimethoprim/Sulfamethoxazole: S <=0.5/9.5    Specimen Source: .Blood None    Organism: Blood Culture PCR    Organism: Klebsiella pneumoniae    Culture Results:   Growth in anaerobic bottle: Klebsiella pneumoniae      MEDICATIONS  (STANDING):  cefepime  Injectable. 1000 milliGRAM(s) IV Push every 12 hours  DAPTOmycin IVPB 200 milliGRAM(s) IV Intermittent every 24 hours  metoprolol tartrate 50 milliGRAM(s) Oral two times a day  metroNIDAZOLE  IVPB 500 milliGRAM(s) IV Intermittent every 8 hours  mupirocin 2% Ointment 1 Application(s) Topical two times a day  predniSONE   Tablet 5 milliGRAM(s) Oral daily  simvastatin 20 milliGRAM(s) Oral at bedtime  sodium chloride 0.9%. 1000 milliLiter(s) (50 mL/Hr) IV Continuous <Continuous>  vancomycin    Solution 125 milliGRAM(s) Oral every 6 hours    MEDICATIONS  (PRN):  acetaminophen   Tablet 650 milliGRAM(s) Oral every 6 hours PRN For Temp greater than 38 C (100.4 F)  milliGRAM(s) Oral every 6 hours PRN For Temp greater than 38 C (100.4 F)      RADIOLOGY & ADDITIONAL TESTS:          PROCEDURE DATE:  2018    INTERPRETATION:  CT ABDOMEN AND PELVIS OC    Findings:  LIVER: Polycystic liver.  SPLEEN: Normal.  PANCREAS: Normal.  GALLBLADDER/BILIARY TREE: Nondilated. Normal gallbladder.  ADRENALS: Normal.  KIDNEYS: Enlarged, polycystic native kidneys. Left iliac fossa renal   allograft shows no hydronephrosis or perinephric collection. 1.7 cm rim   calcified structure at the left transplant kidney hilum may represent a   transplant renal artery aneurysm.  LYMPHADENOPATHY/RETROPERITONEUM: Retroperitoneal lymphadenopathy.  VASCULATURE: Normal caliber aorta.  BOWEL: Focal wall thickening in the cecum. Normal appendix. 2.8 cm in   length circumferential wall thickening in a mid small bowel loop   (coronal; 70)  PELVIC VISCERA: No uterine or adnexal abnormality. Pessary device in   place.  PELVIC LYMPH NODES: No pelvic adenopathy.  PERITONEUM/ABDOMINAL WALL: No free air or ascites. Left abdominal wall   mesh without recurrent hernia.  SKELETAL: No aggressive lesion.  LUNG BASES: Small bilateral pleural effusions and small pericardial   effusion.    IMPRESSION:   Cecal wall thickening is suggested of typhlitis in a patient with   neutropenia.  Polycystic kidney and liver disease.  1.7 cm rim calcified structure at the transplant kidney hilum may   represent a transplant renal artery aneurysm.  Retroperitoneal lymphadenopathy.  2.8 cm in length circumferential wall thickening in a mid small bowel   loop. Primary small bowel neoplasm is considered. No upstream bowel   dilatation.    Small bilateral pleural effusions and small pericardial effusion.          EXAM:  XR CHEST AP OR PA 1V                        PROCEDURE DATE:  2018    Findings:     The lungs are clear. Heart size is mildly enlarged. The visualized   osseous structures are unremarkable. Surgical clips are noted within the   left neck soft tissues    Impression: Mild cardiomegaly.      EXAM:  US DPLX LWR EXT VEINS COMPL BI                        PROCEDURE DATE:  2018        There is no sonographic evidence for deep vein thrombosis in the in the   common femoral, femoral, and popliteal veins of the right and left leg.   The veins are patent compressible with normal venous waveforms and   augmentation. Calf veins are patent to the level of the proximal   posterior tibial vein.    No abnormal fluid collection is noted in the popliteal region.    IMPRESSION: Normal lower extremity venous Doppler of the femoral and   popliteal veins of the right and left leg to the level of the proximal   posterior tibial vein.

## 2018-05-08 NOTE — PROVIDER CONTACT NOTE (CRITICAL VALUE NOTIFICATION) - TEST AND RESULT REPORTED:
WBC 0.28; PLT 19
bld cx - growth in anaerobic bottle gram neg rods
wbc 0.24
wbc 0.25
Platelets 16, WBC 0.61
WBC 0.98, Platelets 7

## 2018-05-08 NOTE — PROGRESS NOTE ADULT - ASSESSMENT
66 y/o F with a PMHx of HTN, PCKD w  CKD 3  Cr ~ 1.5 - 1.6 w hx of renal transplant off all immunosupressants since March due to widespread Merkel cell carcinoma on PET Scan now s/p chemo 1.5 weeks ago), HLD, HTN, admitted for:     Fever /Sepsis/ Neutropenia    - IV Abx as per Dr Erazo : On Daptomycin and Cefepime  - po vanco - for hx of cdiff per ID   -F/u cultures    CKD 3 -    renal transplant off immunosuppression since March due to active metastatic cancer    continue prednisone therapy     Volume   - hx of plueral effusions/SOB   - holding  po lasix home dose    - Monitor volume status closely with hydration   - no NSAID's   - hold ACE or ARB       Hypokalemia and Hypophos - due to diarrhea    - replete with oral supplements as needed     d/c with staff  d/c with spouse

## 2018-05-09 LAB
ANION GAP SERPL CALC-SCNC: 10 MMOL/L — SIGNIFICANT CHANGE UP (ref 5–17)
BUN SERPL-MCNC: 24 MG/DL — HIGH (ref 7–23)
CALCIUM SERPL-MCNC: 8.5 MG/DL — SIGNIFICANT CHANGE UP (ref 8.5–10.1)
CHLORIDE SERPL-SCNC: 115 MMOL/L — HIGH (ref 96–108)
CO2 SERPL-SCNC: 19 MMOL/L — LOW (ref 22–31)
CREAT SERPL-MCNC: 1.22 MG/DL — SIGNIFICANT CHANGE UP (ref 0.5–1.3)
GLUCOSE SERPL-MCNC: 83 MG/DL — SIGNIFICANT CHANGE UP (ref 70–99)
HCT VFR BLD CALC: 26.2 % — LOW (ref 34.5–45)
HGB BLD-MCNC: 8.5 G/DL — LOW (ref 11.5–15.5)
MAGNESIUM SERPL-MCNC: 1.5 MG/DL — LOW (ref 1.6–2.6)
MCHC RBC-ENTMCNC: 30.8 PG — SIGNIFICANT CHANGE UP (ref 27–34)
MCHC RBC-ENTMCNC: 32.4 GM/DL — SIGNIFICANT CHANGE UP (ref 32–36)
MCV RBC AUTO: 94.9 FL — SIGNIFICANT CHANGE UP (ref 80–100)
NRBC # BLD: 0 /100 WBCS — SIGNIFICANT CHANGE UP (ref 0–0)
PHOSPHATE SERPL-MCNC: 1.8 MG/DL — LOW (ref 2.5–4.5)
PLATELET # BLD AUTO: 27 K/UL — LOW (ref 150–400)
POTASSIUM SERPL-MCNC: 3.4 MMOL/L — LOW (ref 3.5–5.3)
POTASSIUM SERPL-SCNC: 3.4 MMOL/L — LOW (ref 3.5–5.3)
RBC # BLD: 2.76 M/UL — LOW (ref 3.8–5.2)
RBC # FLD: 15.9 % — HIGH (ref 10.3–14.5)
SODIUM SERPL-SCNC: 144 MMOL/L — SIGNIFICANT CHANGE UP (ref 135–145)
WBC # BLD: 2.27 K/UL — LOW (ref 3.8–10.5)
WBC # FLD AUTO: 2.27 K/UL — LOW (ref 3.8–10.5)

## 2018-05-09 RX ORDER — SODIUM,POTASSIUM PHOSPHATES 278-250MG
1 POWDER IN PACKET (EA) ORAL
Qty: 0 | Refills: 0 | Status: COMPLETED | OUTPATIENT
Start: 2018-05-09 | End: 2018-05-09

## 2018-05-09 RX ORDER — MAGNESIUM SULFATE 500 MG/ML
1 VIAL (ML) INJECTION ONCE
Qty: 0 | Refills: 0 | Status: COMPLETED | OUTPATIENT
Start: 2018-05-09 | End: 2018-05-09

## 2018-05-09 RX ORDER — POTASSIUM CHLORIDE 20 MEQ
40 PACKET (EA) ORAL ONCE
Qty: 0 | Refills: 0 | Status: COMPLETED | OUTPATIENT
Start: 2018-05-09 | End: 2018-05-09

## 2018-05-09 RX ADMIN — Medication 100 MILLIGRAM(S): at 13:53

## 2018-05-09 RX ADMIN — CEFEPIME 1000 MILLIGRAM(S): 1 INJECTION, POWDER, FOR SOLUTION INTRAMUSCULAR; INTRAVENOUS at 18:15

## 2018-05-09 RX ADMIN — Medication 1 TABLET(S): at 13:53

## 2018-05-09 RX ADMIN — Medication 50 MILLIGRAM(S): at 05:57

## 2018-05-09 RX ADMIN — Medication 100 MILLIGRAM(S): at 05:57

## 2018-05-09 RX ADMIN — Medication 125 MILLIGRAM(S): at 18:15

## 2018-05-09 RX ADMIN — Medication 125 MILLIGRAM(S): at 20:57

## 2018-05-09 RX ADMIN — Medication 125 MILLIGRAM(S): at 05:59

## 2018-05-09 RX ADMIN — Medication 125 MILLIGRAM(S): at 10:53

## 2018-05-09 RX ADMIN — SODIUM CHLORIDE 50 MILLILITER(S): 9 INJECTION INTRAMUSCULAR; INTRAVENOUS; SUBCUTANEOUS at 07:35

## 2018-05-09 RX ADMIN — DAPTOMYCIN 108 MILLIGRAM(S): 500 INJECTION, POWDER, LYOPHILIZED, FOR SOLUTION INTRAVENOUS at 12:51

## 2018-05-09 RX ADMIN — Medication 1 TABLET(S): at 10:53

## 2018-05-09 RX ADMIN — MUPIROCIN 1 APPLICATION(S): 20 OINTMENT TOPICAL at 05:58

## 2018-05-09 RX ADMIN — MUPIROCIN 1 APPLICATION(S): 20 OINTMENT TOPICAL at 18:15

## 2018-05-09 RX ADMIN — Medication 100 MILLIGRAM(S): at 20:57

## 2018-05-09 RX ADMIN — Medication 50 MILLIGRAM(S): at 18:15

## 2018-05-09 RX ADMIN — Medication 5 MILLIGRAM(S): at 05:57

## 2018-05-09 RX ADMIN — SIMVASTATIN 20 MILLIGRAM(S): 20 TABLET, FILM COATED ORAL at 20:57

## 2018-05-09 RX ADMIN — CEFEPIME 1000 MILLIGRAM(S): 1 INJECTION, POWDER, FOR SOLUTION INTRAMUSCULAR; INTRAVENOUS at 05:57

## 2018-05-09 RX ADMIN — Medication 40 MILLIEQUIVALENT(S): at 10:53

## 2018-05-09 RX ADMIN — Medication 100 GRAM(S): at 10:52

## 2018-05-09 RX ADMIN — Medication 1 TABLET(S): at 18:15

## 2018-05-09 NOTE — PROGRESS NOTE ADULT - SUBJECTIVE AND OBJECTIVE BOX
Patient is a 67y old  Female who presents with a chief complaint of Sent in by Dr. carole Roca for fever (05 May 2018 03:18)    Date of service: 05-09-18 @ 09:51  Patient still with loose stools, no abdominal pain  ROS: no fever or chills; denies dizziness, no HA, no SOB or cough, no abdominal pain,  constipation; no dysuria, no urinary frequency, no legs pain, no rashes    MEDICATIONS  (STANDING):  cefepime  Injectable. 1000 milliGRAM(s) IV Push every 12 hours  DAPTOmycin IVPB 200 milliGRAM(s) IV Intermittent every 24 hours  magnesium sulfate  IVPB 1 Gram(s) IV Intermittent once  metoprolol tartrate 50 milliGRAM(s) Oral two times a day  metroNIDAZOLE  IVPB 500 milliGRAM(s) IV Intermittent every 8 hours  mupirocin 2% Ointment 1 Application(s) Topical two times a day  potassium acid phosphate/sodium acid phosphate tablet (K-PHOS No. 2) 1 Tablet(s) Oral three times a day with meals  potassium chloride    Tablet ER 40 milliEquivalent(s) Oral once  predniSONE   Tablet 5 milliGRAM(s) Oral daily  simvastatin 20 milliGRAM(s) Oral at bedtime  sodium chloride 0.9%. 1000 milliLiter(s) (50 mL/Hr) IV Continuous <Continuous>  vancomycin    Solution 125 milliGRAM(s) Oral every 6 hours    MEDICATIONS  (PRN):  acetaminophen   Tablet 650 milliGRAM(s) Oral every 6 hours PRN For Temp greater than 38 C (100.4 F)      Vital Signs Last 24 Hrs  T(C): 36.6 (09 May 2018 05:44), Max: 36.9 (08 May 2018 14:37)  T(F): 97.8 (09 May 2018 05:44), Max: 98.4 (08 May 2018 14:37)  HR: 94 (09 May 2018 05:44) (62 - 94)  BP: 137/73 (09 May 2018 05:44) (124/56 - 137/73)  BP(mean): --  RR: 18 (09 May 2018 05:44) (16 - 18)  SpO2: 95% (09 May 2018 05:44) (95% - 98%)    Physical Exam:        Constitutional: frail looking  HEENT: NC/AT, EOMI, PERRLA, conjunctivae clear; ears and nose atraumatic; pharynx clear  Neck: supple; thyroid not palpable  Respiratory: respiratory effort normal; clear to auscultation  Cardiovascular: S1S2 irregular, 2/6 murmur left sternal border  Abdomen: soft, not tender, not distended, positive BS; no liver or spleen organomegaly  Genitourinary: no suprapubic tenderness  Musculoskeletal: no muscle tenderness, no joint swelling or tenderness; left upper extremity thrill at radial artery  Neurological/ Psychiatric: AxOx3, judgement and insight normal;  moving all extremities  Skin: multiple palpable lesions, hyperkeratotic lesions in various stages of skin breakdown with surrounding erythema; some draining white fluid on lower and upper extremities    Labs: all available labs reviewed             Labs:                  Labs:                     Labs:                        8.5    2.27  )-----------( 27       ( 09 May 2018 05:34 )             26.2     05-09    144  |  115<H>  |  24<H>  ----------------------------<  83  3.4<L>   |  19<L>  |  1.22    Ca    8.5      09 May 2018 05:34  Phos  1.8     05-09  Mg     1.5     05-09             Cultures:       Culture - Urine (collected 05-04-18 @ 19:58)  Source: .Urine None  Final Report (05-05-18 @ 23:16):    <10,000 CFU/ml Normal Urogenital frank present    Culture - Blood (collected 05-04-18 @ 19:58)  Source: .Blood None  Gram Stain (05-05-18 @ 11:49):    Growth in anaerobic bottle: Gram Negative Rods  Final Report (05-07-18 @ 10:31):    Growth in anaerobic bottle: Klebsiella pneumoniae    "Due to technical problems, Proteus sp. will Not be reported as part of    the BCID panel until further notice"    ***Blood Panel PCR results on this specimen are available    approximately 3 hours afterthe Gram stain result.***    Gram stain, PCR, and/or culture results may not always    correspond due to difference in methodologies.    ************************************************************    This PCR assay was performed using [employer].    The following targets are tested for: Enterococcus,    vancomycin resistant enterococci, Listeria monocytogenes,    coagulase negative staphylococci, S. aureus,    methicillin resistant S. aureus, Streptococcus agalactiae    (Group B), S. pneumoniae, S. pyogenes (Group A),    Acinetobacter baumannii, Enterobacter cloacae, E. coli,    Klebsiella oxytoca, K. pneumoniae, Proteus sp.,    Serratia marcescens, Haemophilus influenzae,    Neisseria meningitidis, Pseudomonas aeruginosa, Candida    albicans, C. glabrata, C krusei, C parapsilosis,    C. tropicalis and the KPC resistance gene.  Organism: Blood Culture PCR  Klebsiella pneumoniae (05-07-18 @ 10:31)  Organism: Klebsiella pneumoniae (05-07-18 @ 10:31)      -  Amikacin: S <=8      -  Ampicillin: R >16 These ampicillin results predict results for amoxicillin      -  Ampicillin/Sulbactam: S 8/4      -  Aztreonam: S <=4      -  Cefazolin: S <=2      -  Cefepime: S <=2      -  Cefoxitin: I 16      -  Ceftriaxone: S <=1 Enterobacter, Citrobacter, and Serratia may develop resistance during prolonged therapy      -  Ciprofloxacin: S <=0.5      -  Ertapenem: S <=0.5      -  Gentamicin: S <=1      -  Imipenem: S <=1      -  Levofloxacin: S <=1      -  Meropenem: S <=1      -  Piperacillin/Tazobactam: S <=8      -  Tobramycin: S <=2      -  Trimethoprim/Sulfamethoxazole: S <=0.5/9.5      Method Type: TOMY  Organism: Blood Culture PCR (05-07-18 @ 10:31)      -  Klebsiella pneumoniae: Detec      Method Type: PCR    Culture - Blood (collected 05-04-18 @ 19:58)  Source: .Blood None  Preliminary Report (05-06-18 @ 01:02):    No growth to date.                  Radiology: all available radiological tests reviewed    Advanced directives addressed: full resuscitation

## 2018-05-09 NOTE — PROGRESS NOTE ADULT - ASSESSMENT
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 Notes a history of cdiff as well and had a loose bowel movement. No rectal pain.  1. Patient admitted with neutropenic fever, multiple skin lesions, many look superinfected and patient notes history of colonization with MRSA, also history of Cdiff in past  - found to have Klebsiella pneumoniae in blood cultures; most likely bacterial translocation from bowel secondary to neutropenia  - had stopped her immunosuppression for her kidney transplant a few weeks ago as well  - follow up cultures   - iv hydration and supportive care   - serial cbc and monitor temperature   - day #5 daptomycin and cefepime  - day #4 iv flagyl as well given neutropenia  - day #5 po vancomycin to prevent cdiff colitis  - contact precautions  -  repeat blood cultures pending  - if repeat blood cultures clear most likely will switch iv antibiotics to oral for discharge planning  2. other issues:  Merkel cell carcinoma (chemo 1.5 weeks ago), HLD, HTN, s/p kidney transplant (17 years ago, off immunotherapy for last 6 weeks)   - per medicine

## 2018-05-09 NOTE — PROGRESS NOTE ADULT - SUBJECTIVE AND OBJECTIVE BOX
Patient is a 67y Female who reports no complaints overnight.  but c/o sob when lying flat     still having diarrhea - but less     REVIEW OF SYSTEMS:    CONSTITUTIONAL: No weakness, fevers or chills  RESPIRATORY: No cough, wheezing, hemoptysis; No shortness of breath  CARDIOVASCULAR: No chest pain or palpitations  GENITOURINARY: No dysuria, frequency or hematuria  All other review of systems is negative unless indicated above.    MEDICATIONS  (STANDING):  cefepime  Injectable. 1000 milliGRAM(s) IV Push every 12 hours  DAPTOmycin IVPB 200 milliGRAM(s) IV Intermittent every 24 hours  magnesium sulfate  IVPB 1 Gram(s) IV Intermittent once  metoprolol tartrate 50 milliGRAM(s) Oral two times a day  metroNIDAZOLE  IVPB 500 milliGRAM(s) IV Intermittent every 8 hours  mupirocin 2% Ointment 1 Application(s) Topical two times a day  potassium acid phosphate/sodium acid phosphate tablet (K-PHOS No. 2) 1 Tablet(s) Oral three times a day with meals  potassium chloride    Tablet ER 40 milliEquivalent(s) Oral once  predniSONE   Tablet 5 milliGRAM(s) Oral daily  simvastatin 20 milliGRAM(s) Oral at bedtime  vancomycin    Solution 125 milliGRAM(s) Oral every 6 hours      Vital Signs Last 24 Hrs  T(C): 36.6 (09 May 2018 05:44), Max: 36.9 (08 May 2018 14:37)  T(F): 97.8 (09 May 2018 05:44), Max: 98.4 (08 May 2018 14:37)  HR: 94 (09 May 2018 05:44) (62 - 94)  BP: 137/73 (09 May 2018 05:44) (124/56 - 137/73)  BP(mean): --  RR: 18 (09 May 2018 05:44) (16 - 18)  SpO2: 95% (09 May 2018 05:44) (95% - 98%)    PHYSICAL EXAM:    Constitutional: NAD  HEENT: PERRLA, EOMI,  MMM  Neck: No LAD, No JVD  Respiratory: good aeration b/l  Cardiovascular: S1 and S2, RRR  Gastrointestinal: BS+, soft, NT/ND  Extremities: No peripheral edema  Neurological: A/O x 3, no focal deficits  Psychiatric: Normal mood, normal affect  : No Manning  Skin: No rashes  Access: Not applicable    LABS:                            8.5    2.27  )-----------( 27       ( 09 May 2018 05:34 )             26.2                         8.7    1.97  )-----------( 37       ( 08 May 2018 20:20 )             26.3     144    |  115    |  24     ----------------------------<  83        09 May 2018 05:34  3.4     |  19     |  1.22     141    |  112    |  26     ----------------------------<  88        08 May 2018 07:02  3.6     |  19     |  1.21     141    |  112    |  24     ----------------------------<  94        07 May 2018 06:38  3.4     |  18     |  1.24     Ca    8.5        09 May 2018 05:34  Ca    8.2        08 May 2018 07:02    Phos  1.8       09 May 2018 05:34  Phos  1.5       07 May 2018 06:38    Urine Studies:      RADIOLOGY & ADDITIONAL STUDIES:         EXAM:  2D ECHO FOLLOW UP AD         PROCEDURE DATE:  05/08/2018        INTERPRETATION:  Transthoracic Echocardiography Report (TTE)     Demographics     Patient name        JUICE CHU    Age           67 year(s)     Med Rec #           [id number]         Gender        Female     Account #           [id number]           Date of Birth 1950     Interpreting        Oh Clayton MD  Room Number   0003   Physician     Referring Physician DONNA LI MD  Sonographer   Jonathan Vargas,                                                    [facility]     Date of study       05/08/2018 01:23                       PM     Height              62.01 in          Weight        110.23 pounds    Type of Study:     TTE procedure: 2D Echo Follow UP AD     Study Location: 1NTechnical Quality: Limited study    Indications   1) I31.3 - Pericardial effusion noninflammatory    Doppler Measurements:      MV Peak E-Wave: 105 cm/s      MV Peak Gradient: 4.41 mmHg     Findings     Mitral Valve   Normal mitral valve as seen     Aortic Valve   Normal aortic valve as seen     Left Ventricle   Limited study to asses pericardial effusion.   Estimated left ventricular ejection fraction is 50-55 %.     Pericardial Effusion   A moderate-sized pericardial effusion is present.   No Significant changes since prior study.     Pleural Effusion   Pleural effusion cannot be ruled out.     Miscellaneous   IVC is collapsing with inspiration.    Comparative Statement  No Significant changes since prior study.     Impression     Summary     Limited study to asses pericardial effusion.   Estimated left ventricular ejection fraction is 50-55 %.   Normal aortic valve as seen   Normal mitral valve as seen   A moderate-sized pericardial effusion is present.   No Significant changes since prior study.     Signature     ----------------------------------------------------------------   Electronically signed by Oh Clayton MD(Interpreting   physician) on 05/08/2018 03:27 PM   ----------------------------------------------------------------    Valves     Mitral Valve     Peak E-Wave: 105 cm/s   Peak Gradient: 4.41 mmHg    Structures

## 2018-05-09 NOTE — CONSULT NOTE ADULT - CONSULT REASON
Pericardial effusion and transient tachycardia.
CKD 3  renal transplant
cellulitis
patient with Merkel cell cancer s/p chemotherapy, with pancytopenia

## 2018-05-09 NOTE — CONSULT NOTE ADULT - ASSESSMENT
impression:  67-year-old female, with complex past medical history including,, polycystic kidney and liver disease s/p kidney transplant 17 years ago  On immunosuppressive therapy until one and half months ago  long history of recurrent skin cancers  New diagnosis Merkel cell carcinoma  s/p resection, XRT, 2 cycles of immunotherapy, reportedly with no response to treatment  10 days ago received carboplatin and -16 ;s/ p NEULASTA  April 29  Now with pancytopenia and Klebsiella sepsis    cytopenias are probably related to patient's recent chemotherapy; exacerbated by probable nutritional deficiencies  Continue present care, antimicrobial therapy as per ID     WBC :count appears slowing increasing over the last several days there is no further intervention he did at present as she has already received Neulasta; ;as faviola period passes I anticipate WBC count will continue to rise  thrombocytopenia/anemia is multifactorial, related to recent chemotherapy,marginal nutritional status/sepsis  Agree with plan for empiric platelet transfusion if platelets are below 10,000 or there is evidence of unusual bleeding or bruising  follow CBC daily    Check iron studies B12 and folate and suggest nutritional supports.  We will Follow with you as needed

## 2018-05-09 NOTE — PROGRESS NOTE ADULT - ASSESSMENT
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 Fever.  Sepsis. Neutropenia. Klebsiella bacteremia   - found to have typhlitis  on CT abd/pelvis   - Neutropenic precautions   - CXR neg   - UA+,  UCX  neg   - BCX: + Klebsiela pneumonia in   anaerobic CX   - C/w IV Abxs as per ID : On Dapto and Cefepime, Flagyl   - On PO VAnco for C.Diff PPxs as pt has past history   - Stool Cdiff negative  - D/w DR Erazo, will await for repeat BCX          2.  CKD stage 3  h/o Renal transplant  Renal Fx is stable   Continue prednisone  Avoid nephrotoxic agents   D/w Renal     4. HTN  Monitor BP, stable   C/w amlodipine     5. Merkel cell carcinoma., recently Dz with mediastinal LAD and liver mets. Pancytopenia   On Chemo: Carboplatin and Etoposide   Pancytopenia likely due to chemo  Had Neulasta about 10 days ago   S/p Transfusion  Plts 1U   - Plts better this am, WBCs start improving, H/H stable   Off  ASA   Monitor cbc daily   CT abd/pelvis results noted for 2.8 cm circumferential small bowel wall thickening, as per radiology possible  primary neoplasm Pt will need to f/u with GI and her  oncologist for further evaluation when stable   Labs in am     6. Moderate pericardial effusion  asymptomatic   as per Pt had it since 2011 and followed by Cardio  at [facility]  ECHO was repeat today and looks stable  Pt hemodynamically stable, asymptomatic         7.  Episode of tachycardia and irregular HR  EKG: SR with APCs   Monitor placed   Will follow with cardio     8. DVT PPXs: Cannot give chemical Anticoagulation due to very low platelet.  Cannot SCD either due to extensive lesions in both legs. 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 Fever.  Sepsis. Neutropenia. Klebsiella bacteremia   - found to have typhlitis  on CT abd/pelvis   - Neutropenic precautions   - CXR neg   - UA+,  UCX  neg   - BCX: + Klebsiela pneumonia in   anaerobic CX   - C/w IV Abxs as per ID : On Dapto and Cefepime, Flagyl   - On PO VAnco for C.Diff PPxs as pt has past history   - Stool Cdiff negative  - D/w DR Erazo, will await for repeat BCX          2.  CKD stage 3  h/o Renal transplant  Renal Fx is stable   Continue prednisone  Avoid nephrotoxic agents   D/w Renal     4. HTN  Monitor BP, stable   C/w amlodipine     5. Merkel cell carcinoma., recently Dz with mediastinal LAD and liver mets. Pancytopenia   On Chemo: Carboplatin and Etoposide   Pancytopenia likely due to chemo  Had Neulasta about 10 days ago   S/p Transfusion  Plts 1U   - Plts better this am, WBCs start improving, H/H stable   Off  ASA   Monitor cbc daily   CT abd/pelvis results noted for 2.8 cm circumferential small bowel wall thickening, as per radiology possible  primary neoplasm Pt will need to f/u with GI and her  oncologist for further evaluation when stable   Labs in am     6. Moderate pericardial effusion  asymptomatic   as per Pt had it since 2011 and followed by Cardio  at [facility]  ECHO was repeat today and looks stable  Pt hemodynamically stable, asymptomatic         7.  Episode of tachycardia and irregular HR  EKG: SR with APCs   Monitor placed   Will follow with cardio     8. Hypophosphatemia/Hypokalemia/Hypomagnesemia   replace PO/IC  recheck in am     9. DVT PPXs: Cannot give chemical Anticoagulation due to very low platelet.  Cannot SCD either due to extensive lesions in both legs.

## 2018-05-09 NOTE — PROGRESS NOTE ADULT - SUBJECTIVE AND OBJECTIVE BOX
CC: Sent in by Dr. Francois  at [facility] for fever (05 May 2018 03:18)    HPI:  68 y/o F with a PMHx of Merkel cell carcinoma (chemo 1.5 weeks ago), HLD, HTN, s/p kidney transplant (17 years ago, off immunotherapy for last 6 weeks) presented to the ED regarding generalized weakness x1.5 weeks. Pt reports a fever (100.8 Tmax) this afternoon. +rash on RLE. Pt notes loose stools x3 days. Pt was constipated recently and given stool softener by oncologist. No cough, CP, SOB, abd pain, urinary complaints. (05 May 2018 03:08)    INTERVAL HPI/ OVERNIGHT EVENTS:  Pt was seen and examined, feeling better, no fevers or chills, still some loose stools, had 3 today.  No abd pain.  POC discussed in details     REVIEW OF SYSTEMS:  All other review of systems is negative unless indicated above.      PHYSICAL EXAM:  General: Well developed; malnourished; in no acute distress  Eyes: PERRLA, EOMI; conjunctiva and sclera clear  Head: Normocephalic; atraumatic  ENMT: No nasal discharge; airway clear  Neck: Supple; non tender; no masses  Respiratory: Good air entry, No  rales or rhonchi  Cardiovascular: Regular rate and rhythm. S1 and S2 Normal; No murmurs  Gastrointestinal: Soft, non-tender, not  distended; Normal bowel sounds  Genitourinary: No costovertebral angle tenderness  Extremities: Normal range of motion, No  edema. Multiple LE skin lesions, RLE with dressing, intact   Vascular: Peripheral pulses palpable 2+ bilaterally  Neurological: Alert and oriented x4, non focal   Skin: Warm and dry. No acute rash  Lymph Nodes: No acute cervical adenopathy  Musculoskeletal: Normal muscle tone, without deformities  Psychiatric: Cooperative and appropriate        LABS:                         8.5    2.27  )-----------( 27       ( 09 May 2018 05:34 )             26.2     05-    144  |  115<H>  |  24<H>  ----------------------------<  83  3.4<L>   |  19<L>  |  1.22    Ca    8.5      09 May 2018 05:34  Phos  1.8     05-  Mg     1.5     -09                          9.1    0.98  )-----------( 7        ( 08 May 2018 07:02 )             28.3     05-08    141  |  112<H>  |  26<H>  ----------------------------<  88  3.6   |  19<L>  |  1.21                            9.4    0.61  )-----------( 16       ( 07 May 2018 06:38 )             29.4     05-07    141  |  112<H>  |  24<H>  ----------------------------<  94  3.4<L>   |  18<L>  |  1.24    Ca    8.4<L>      07 May 2018 06:38  Phos  1.5     05-07  Mg     1.6                                   8.8    0.28  )-----------( 19       ( 06 May 2018 06:00 )             26.9     05-05    138  |  107  |  38<H>  ----------------------------<  90  4.2   |  19<L>  |  1.23    Ca    8.0<L>      05 May 2018 06:30      Urinalysis Basic - ( 04 May 2018 19:58 )    Color: Yellow / Appearance: Clear / S.010 / pH: x  Gluc: x / Ketone: Negative  / Bili: Negative / Urobili: Negative mg/dL   Blood: x / Protein: 100 mg/dL / Nitrite: Negative   Leuk Esterase: Small / RBC: 6-10 /HPF / WBC 6-10   Sq Epi: x / Non Sq Epi: Moderate / Bacteria: Moderate    Culture - Urine (18 @ 19:58)    Specimen Source: .Urine None    Culture Results:   <10,000 CFU/ml Normal Urogenital frank present      Culture - Blood (18 @ 19:58)    -  Klebsiella pneumoniae: Detec    Gram Stain:   Growth in anaerobic bottle: Gram Negative Rods    -  Amikacin: S <=8    -  Ampicillin: R >16 These ampicillin results predict results for amoxicillin    -  Ampicillin/Sulbactam: S 8/4    -  Aztreonam: S <=4    -  Cefazolin: S <=2    -  Cefepime: S <=2    -  Cefoxitin: I 16    -  Ceftriaxone: S <=1 Enterobacter, Citrobacter, and Serratia may develop resistance during prolonged therapy    -  Ciprofloxacin: S <=0.5    -  Ertapenem: S <=0.5    -  Gentamicin: S <=1    -  Imipenem: S <=1    -  Levofloxacin: S <=1    -  Meropenem: S <=1    -  Piperacillin/Tazobactam: S <=8    -  Tobramycin: S <=2    -  Trimethoprim/Sulfamethoxazole: S <=0.5/9.5    Specimen Source: .Blood None    Organism: Blood Culture PCR    Organism: Klebsiella pneumoniae    Culture Results:   Growth in anaerobic bottle: Klebsiella pneumoniae  Culture - Blood in AM (18 @ 07:02)    Specimen Source: .Blood None    Culture Results:   No growth to date.    Culture - Blood in AM (18 @ 07:07)    Specimen Source: .Blood None    Culture Results:   No growth to date.        MEDICATIONS  (STANDING):  cefepime  Injectable. 1000 milliGRAM(s) IV Push every 12 hours  DAPTOmycin IVPB 200 milliGRAM(s) IV Intermittent every 24 hours  metoprolol tartrate 50 milliGRAM(s) Oral two times a day  metroNIDAZOLE  IVPB 500 milliGRAM(s) IV Intermittent every 8 hours  mupirocin 2% Ointment 1 Application(s) Topical two times a day  predniSONE   Tablet 5 milliGRAM(s) Oral daily  simvastatin 20 milliGRAM(s) Oral at bedtime  sodium chloride 0.9%. 1000 milliLiter(s) (50 mL/Hr) IV Continuous <Continuous>  vancomycin    Solution 125 milliGRAM(s) Oral every 6 hours    MEDICATIONS  (PRN):  acetaminophen   Tablet 650 milliGRAM(s) Oral every 6 hours PRN For Temp greater than 38 C (100.4 F)  milliGRAM(s) Oral every 6 hours PRN For Temp greater than 38 C (100.4 F)      RADIOLOGY & ADDITIONAL TESTS:          PROCEDURE DATE:  2018    INTERPRETATION:  CT ABDOMEN AND PELVIS OC    Findings:  LIVER: Polycystic liver.  SPLEEN: Normal.  PANCREAS: Normal.  GALLBLADDER/BILIARY TREE: Nondilated. Normal gallbladder.  ADRENALS: Normal.  KIDNEYS: Enlarged, polycystic native kidneys. Left iliac fossa renal   allograft shows no hydronephrosis or perinephric collection. 1.7 cm rim   calcified structure at the left transplant kidney hilum may represent a   transplant renal artery aneurysm.  LYMPHADENOPATHY/RETROPERITONEUM: Retroperitoneal lymphadenopathy.  VASCULATURE: Normal caliber aorta.  BOWEL: Focal wall thickening in the cecum. Normal appendix. 2.8 cm in   length circumferential wall thickening in a mid small bowel loop   (coronal; 70)  PELVIC VISCERA: No uterine or adnexal abnormality. Pessary device in   place.  PELVIC LYMPH NODES: No pelvic adenopathy.  PERITONEUM/ABDOMINAL WALL: No free air or ascites. Left abdominal wall   mesh without recurrent hernia.  SKELETAL: No aggressive lesion.  LUNG BASES: Small bilateral pleural effusions and small pericardial   effusion.    IMPRESSION:   Cecal wall thickening is suggested of typhlitis in a patient with   neutropenia.  Polycystic kidney and liver disease.  1.7 cm rim calcified structure at the transplant kidney hilum may   represent a transplant renal artery aneurysm.  Retroperitoneal lymphadenopathy.  2.8 cm in length circumferential wall thickening in a mid small bowel   loop. Primary small bowel neoplasm is considered. No upstream bowel   dilatation.    Small bilateral pleural effusions and small pericardial effusion.          EXAM:  XR CHEST AP OR PA 1V                        PROCEDURE DATE:  2018    Findings:     The lungs are clear. Heart size is mildly enlarged. The visualized   osseous structures are unremarkable. Surgical clips are noted within the   left neck soft tissues    Impression: Mild cardiomegaly.      EXAM:  US DPLX LWR EXT VEINS COMPL BI                        PROCEDURE DATE:  2018        There is no sonographic evidence for deep vein thrombosis in the in the   common femoral, femoral, and popliteal veins of the right and left leg.   The veins are patent compressible with normal venous waveforms and   augmentation. Calf veins are patent to the level of the proximal   posterior tibial vein.    No abnormal fluid collection is noted in the popliteal region.    IMPRESSION: Normal lower extremity venous Doppler of the femoral and   popliteal veins of the right and left leg to the level of the proximal   posterior tibial vein.

## 2018-05-09 NOTE — PROGRESS NOTE ADULT - ASSESSMENT
66 y/o F with a PMHx of HTN, PCKD w  CKD 3  Cr ~ 1.5 - 1.6 w hx of renal transplant off all immunosupressants since March due to widespread Merkel cell carcinoma on PET Scan now s/p chemo 1.5 weeks ago), HLD, HTN, admitted for:     Fever /Sepsis/ Neutropenia - kelbseilla bactaremia   - IV Abx as per Dr Erazo : On Daptomycin and Cefepime  - po vanco - for hx of cdiff per ID   - f/u repeat cultures   - CT noted - ? GI source     CKD 3 -    renal transplant off immunosuppression since March due to active metastatic cancer    continue prednisone therapy    monitor cr daily    no nsaid's    - no NSAID's   - hold ACE or ARB     Tachyarrythmia    - Dr Patterson - cardiology noted - Holter in place and metoprolol dose increased    - hx of pleural effusions/SOB   - holding  po lasix home dose    - hydration - will dc IVF due to orthopnea and + pericardial effusion on echo - continue to hold lasix    - pericaridal eff - could this be malignant ??  related to Merkel's as pt had + PET scan showing retrocardiac lesions     Hypokalemia and Hypophos - due to diarrhea    - replete with oral supplements as needed     d/w  andpt

## 2018-05-09 NOTE — CONSULT NOTE ADULT - SUBJECTIVE AND OBJECTIVE BOX
66 y/o F with a PMHx of PCKD w DDRT - off immunotherapy since  due to widespread cancer  , hx of CMV virema, w active basal and squamous skin cancers with recent dx  Merkel cell carcinoma - was on immunotherapy Avelumab and changed to etopiside and carboplatinum on ,, after  PET scan showed lesions in her liver and near her heart.   Since then feeling weak (chemo 1.5 weeks ago), presented to the ED regarding generalized weakness x1.5 weeks. Pt reports a fever (100.8 Tmax) +rash on RLE. Pt notes loose stools x3 days. Pt was constipated recently and given stool softener by oncologist. No cough, CP, SOB, abd pain, urinary complaints. (05 May 2018 03:08)   pt was started on Lasix recently by cardiology - Dr Aguilar for presume CHF with increased SOB and orthopnea - pt states she was feeling better w weigh loss with lasix     CKD 3 Cr ~ 1.6- 1.7 while on lasix      TODAY:  feeling tired and weak   no cough, no dysuria, no sob or cp , vomting , diarrhea ++      PAST MEDICAL & SURGICAL HISTORY:  MVP (mitral valve prolapse)  Raynaud's disease  Squamous cell carcinoma: multiple extensive lesions of torso-all extremities-face/scalp  Hypertension  Hyperlipidemia  Polycystic kidney disease  Fibroid uterus  H/O lumpectomy: left  History of incisional hernia repair  History of kidney transplant: left  History of Mohs surgery for squamous cell carcinoma of skin: recent left arm procedure-multiple Moh&#x27;s procedure in the past    Home Medications:  amLODIPine 5 mg oral tablet: 1 tab(s) orally once a day (05 May 2018 01:22)  aspirin 81 mg oral tablet: 1 tab(s) orally once a day (05 May 2018 01:22)  Klor-Con 10 oral tablet, extended release: 1 tab(s) orally once a day (05 May 2018 01:22)  Lasix 40 mg oral tablet: 1 tab(s) orally once a day (05 May 2018 01:22)  losartan 100 mg oral tablet: 1 tab(s) orally once a day (05 May 2018 01:22)  predniSONE 5 mg oral tablet: 1 tab(s) orally once a day (05 May 2018 01:22)  simvastatin 20 mg oral tablet: 1 tab(s) orally once a day (at bedtime) (05 May 2018 01:22)    MEDICATIONS  (STANDING):  amLODIPine   Tablet 5 milliGRAM(s) Oral daily  cefepime  Injectable. 1000 milliGRAM(s) IV Push every 12 hours  DAPTOmycin IVPB 200 milliGRAM(s) IV Intermittent every 24 hours  metoprolol tartrate 50 milliGRAM(s) Oral two times a day  metroNIDAZOLE  IVPB 500 milliGRAM(s) IV Intermittent every 8 hours  mupirocin 2% Ointment 1 Application(s) Topical two times a day  predniSONE   Tablet 5 milliGRAM(s) Oral daily  simvastatin 20 milliGRAM(s) Oral at bedtime  sodium chloride 0.9%. 1000 milliLiter(s) (125 mL/Hr) IV Continuous <Continuous>  vancomycin    Solution 125 milliGRAM(s) Oral every 6 hours      Allergies    bacitracin (Rash)  Levaquin (Sedation/Somnol; Muscle Pain)  tetracyclines (Other)    Intolerances        SOCIAL HISTORY:  Denies ETOh,Smoking,     FAMILY HISTORY:  No pertinent family history in first degree relatives      REVIEW OF SYSTEMS:    CONSTITUTIONAL: No weakness, fevers or chills  EYES/ENT: No visual changes;  No vertigo or throat pain   NECK: No pain or stiffness  RESPIRATORY: No cough, wheezing, hemoptysis; No shortness of breath  CARDIOVASCULAR: No chest pain or palpitations  GASTROINTESTINAL: No abdominal or epigastric pain. No nausea, vomiting, or hematemesis; No diarrhea or constipation. No melena or hematochezia.  GENITOURINARY: No dysuria, frequency or hematuria  NEUROLOGICAL: No numbness or weakness  SKIN: No itching, burning, rashes, or lesions   All other review of systems is negative unless indicated above.    Vital Signs Last 24 Hrs  T(C): 36.4 (06 May 2018 12:07), Max: 37.6 (06 May 2018 04:54)  T(F): 97.6 (06 May 2018 12:07), Max: 99.7 (06 May 2018 04:54)  HR: 62 (06 May 2018 12:07) (60 - 64)  BP: 123/62 (06 May 2018 12:07) (118/62 - 124/50)  BP(mean): --  RR: 17 (06 May 2018 12:07) (17 - 17)  SpO2: 94% (06 May 2018 12:07) (92% - 95%)    I and O's:     @ 07:01  -   @ 07:00  --------------------------------------------------------  IN: 975 mL / OUT: 800 mL / NET: 175 mL     @ 07:01  -   @ 15:54  --------------------------------------------------------  IN: 950 mL / OUT: 500 mL / NET: 450 mL      PHYSICAL EXAM:    Constitutional: NAD  HEENT:  EOMI,  MMM  Neck: No LAD, No JVD  Respiratory: CTAB, diminshed AE  Cardiovascular: S1 and S2  Gastrointestinal: BS+, soft, NT/ND  Extremities: + peripheral edema  Neurological: A/O x 3, no focal deficits  Psychiatric: Normal mood, normal affect  : No Manning  Skin: No rashes  Access: Not applicable    LABS:                                      8.8    0.28  )-----------( 19       ( 06 May 2018 06:00 )             26.9                         9.6    0.24  )-----------( 39       ( 05 May 2018 06:30 )             28.7     138    |  107    |  38     ----------------------------<  90        05 May 2018 06:30  4.2     |  19     |  1.23     137    |  105    |  46     ----------------------------<  100       04 May 2018 19:58  5.1     |  21     |  1.45     Ca    8.0        05 May 2018 06:30  Ca    8.9        04 May 2018 19:58    Urine Studies:  Urinalysis Basic - ( 04 May 2018 19:58 )    Color: Yellow / Appearance: Clear / S.010 / pH: x  Gluc: x / Ketone: Negative  / Bili: Negative / Urobili: Negative mg/dL   Blood: x / Protein: 100 mg/dL / Nitrite: Negative   Leuk Esterase: Small / RBC: 6-10 /HPF / WBC 6-10   Sq Epi: x / Non Sq Epi: Moderate / Bacteria: Moderate  Culture Results:   <10,000 CFU/ml Normal Urogenital frank present (18 @ 19:58)    Culture - Blood (18 @ 19:58)    -  Klebsiella pneumoniae: Detec    Gram Stain:   Growth in anaerobic bottle: Gram Negative Rods    Specimen Source: .Blood None    Organism: Blood Culture PCR    Culture Results:   Growth in anaerobic bottle: Gram Negative Rods  "Due to technical problems, Proteus sp. will Not be reported as part of  the BCID panel until further notice"  ***Blood Panel PCR results on this specimen are available  approximately 3 hours after the Gram stain result.***  Gram stain, PCR, and/or culture results may not always  correspond due to difference in methodologies.  ************************************************************  This PCR assay was performed using [employer].  The following targets are tested for: Enterococcus,  vancomycin resistant enterococci, Listeria monocytogenes,  coagulase negative staphylococci, S. aureus,  methicillin resistant S. aureus, Streptococcus agalactiae  (Group B), S. pneumoniae, S. pyogenes (Group A),  Acinetobacter baumannii, Enterobacter cloacae, E. coli,  Klebsiella oxytoca, K. pneumoniae, Proteus sp.,  Serratia marcescens, Haemophilus influenzae,  Neisseria meningitidis, Pseudomonas aeruginosa, Candida  albicans, C. glabrata, C krusei, C parapsilosis,  C. tropicalis and the KPC resistance gene.    Organism Identification: Blood Culture PCR    Method Type: PCR      -  Klebsiella pneumoniae: Detec (18 @ 19:58)          RADIOLOGY & ADDITIONAL STUDIES:    EXAM:  XR CHEST AP OR PA 1V                            PROCEDURE DATE:  2018          INTERPRETATION:  History: Fever.    AP view of the chest was obtained.    No prior studies available for comparison.     Findings:     The lungs are clear. Heart size is mildly enlarged. The visualized   osseous structures are unremarkable. Surgical clips are noted within the   left neck soft tissues    Impression: Mild cardiomegaly.
Patient is a 67y old  Female who presents with a chief complaint of Sent in by Dr. carole Roca for fever (05 May 2018 03:18)    HPI:  68 y/o F with a PMHx of Merkel cell carcinoma (chemo 1.5 weeks ago), HLD, HTN, s/p kidney transplant (17 years ago, off immunotherapy for last 6 weeks) admitted on  for evaluation of fever, weakness and multiple skin lesions on lower extremities; patient last chemotherapy was with cisplatin and etoposide about one week ago along with neulasta. Notes a history of cdiff as well and had a loose bowel movement. No rectal pain.            PMH: as above  PSH: as above  Meds: per reconciliation sheet, noted below  MEDICATIONS  (STANDING):  amLODIPine   Tablet 5 milliGRAM(s) Oral daily  aspirin enteric coated 81 milliGRAM(s) Oral daily  cefepime  Injectable. 1000 milliGRAM(s) IV Push every 12 hours  DAPTOmycin IVPB 200 milliGRAM(s) IV Intermittent every 24 hours  metoprolol tartrate 50 milliGRAM(s) Oral two times a day  predniSONE   Tablet 5 milliGRAM(s) Oral daily  simvastatin 20 milliGRAM(s) Oral at bedtime  sodium chloride 0.9%. 1000 milliLiter(s) (125 mL/Hr) IV Continuous <Continuous>  vancomycin    Solution 125 milliGRAM(s) Oral every 6 hours    MEDICATIONS  (PRN):  acetaminophen   Tablet 650 milliGRAM(s) Oral every 6 hours PRN For Temp greater than 38 C (100.4 F)    Allergies    bacitracin (Rash)  Levaquin (Sedation/Somnol; Muscle Pain)  tetracyclines (Other)    Intolerances      Social: no smoking, no alcohol, no illegal drugs; no recent travel, no exposure to TB  FAMILY HISTORY:  No pertinent family history in first degree relatives     no history of premature cardivascular disease in first degree relatives  ROS: the patient  no chills, no HA, no dizziness, no sore throat, no blurry vision, no CP, no palpitations, no SOB, no cough, no abdominal pain,  no N/V, no dysuria, no leg pain, no claudication, no rash, no joint aches, no rectal pain or bleeding, no night sweats  All other systems reviewed and are negative    Vital Signs Last 24 Hrs  T(C): 36.3 (05 May 2018 05:52), Max: 37.7 (05 May 2018 01:15)  T(F): 97.3 (05 May 2018 05:52), Max: 99.8 (05 May 2018 01:15)  HR: 62 (05 May 2018 05:52) (62 - 114)  BP: 144/63 (05 May 2018 05:52) (108/69 - 144/63)  BP(mean): --  RR: 18 (05 May 2018 05:52) (16 - 18)  SpO2: 99% (05 May 2018 05:52) (95% - 99%)  Daily Height in cm: 157.48 (04 May 2018 18:42)    Daily Weight in k.2 (05 May 2018 03:16)    PE:    Constitutional: frail looking  HEENT: NC/AT, EOMI, PERRLA, conjunctivae clear; ears and nose atraumatic; pharynx clear  Neck: supple; thyroid not palpable  Back: no tenderness  Respiratory: respiratory effort normal; clear to auscultation  Cardiovascular: S1S2 irregular, no murmurs  Abdomen: soft, not tender, not distended, positive BS; no liver or spleen organomegaly  Genitourinary: no suprapubic tenderness  Lymphatic: no LN palpable  Musculoskeletal: no muscle tenderness, no joint swelling or tenderness; left upper extremity thrill at radial artery  Neurological/ Psychiatric: AxOx3, judgement and insight normal;  moving all extremities  Skin: multiple palpable lesions, hyperkeratotic lesions in various stages of skin breakdown with surrounding erythema; some draining white fluid on lower and upper extremities    Labs: all available labs reviewed                        9.6    0.24  )-----------( 39       ( 05 May 2018 06:30 )             28.7         138  |  107  |  38<H>  ----------------------------<  90  4.2   |  19<L>  |  1.23    Ca    8.0<L>      05 May 2018 06:30    TPro  6.5  /  Alb  3.0<L>  /  TBili  0.8  /  DBili  x   /  AST  20  /  ALT  15  /  AlkPhos  76  05-04     LIVER FUNCTIONS - ( 04 May 2018 19:58 )  Alb: 3.0 g/dL / Pro: 6.5 gm/dL / ALK PHOS: 76 U/L / ALT: 15 U/L / AST: 20 U/L / GGT: x           Urinalysis Basic - ( 04 May 2018 19:58 )    Color: Yellow / Appearance: Clear / S.010 / pH: x  Gluc: x / Ketone: Negative  / Bili: Negative / Urobili: Negative mg/dL   Blood: x / Protein: 100 mg/dL / Nitrite: Negative   Leuk Esterase: Small / RBC: 6-10 /HPF / WBC 6-10   Sq Epi: x / Non Sq Epi: Moderate / Bacteria: Moderate          Radiology: all available radiological tests reviewed    Advanced directives addressed: full resuscitation
chart reviewed, case discussed with the hospitalist, patient,  and sister at bedside  Patient with history of polycystic kidney disease; received kidney transplant 2000 at the [facility];  Stopped immunotherapy several weeks ago.  Recently diagnosed with Merkel cell carcinoma right lower extremity. treated at [facility] [place] in [facility]  had tumor resected, received XRT, and then immunotherapy x 2.  follow-up scans showed rapid progression/no responses immunotherapy  April 26/27/28 received cycle 1 carboplatin and Etoposide(-16); then Neulasta(onpro).  May 4, 2018, day 9; noted fever of 100.8 and rash on right lower extremity ,loose stools times several days and was admitted to [facility]  May 4, 2018 19:58 WBC 0.25, Hgb 11.7, HCT 35.5, MCV 94.5 platelet 50,000; lactate 0.7    patient with history of C. difficile and colonization with MRSA    patient seen by infectious disease, cultured fully and began broad spectrum antibiotics  Blood culture revealed Klebsiella pneumonia;  probably from gastrointestinal/bowel origin  CT scan of abdomen and pelvis: polycystic kidney and liver disease  Retroperitoneal lymphadenopathy   nonspecific cecal wall thickening/2.8 cm circumferential wall thickening in the mid small bowel loop/  small bilateral pericardial and pleural effusions  Patient is currently on daptomycin, cefepime, oral vancomycin, steroids    patient denies rigors or any abdominal pain, nausea or vomiting, or unusual bleeding    Today May 8; WBC now .98 hemoglobin 9.1  platelet 7000    past medical history hyperlipidemia, hypertension, mitral valve prolapse, Raynaud's disease,  Numerous squamous cell carcinomas involving all extremities torso face scalp  Past surgical history, fibroid uterus, left breast lumpectomy, hernia repair, kidney transplant, placement of dialysis fistula, numerous skin cancer surgeries    Family history her sister has polycystic kidney  Alcohol tobacco use denied, lives at home with her     Physical examination chronically ill-looking/frail 67-year-old female in bed alert and oriented no acute distress  HEENT normocephalic anicteric  Neck supple  Lungs clear  Heart S1-S2  Abdomen soft nontender no masses noted bowel sounds present  Skin numerous raised hyperkeratotic lesions some with necrosis and surrounding erythema  Extremities without edema, multiple skin lesions, bandage/dressing right lower extremity
HPI:  66 y/o F with a PMHx of Merkel cell carcinoma (chemo 1.5 weeks ago), HLD, HTN, s/p kidney transplant (17 years ago, off immunotherapy for last 6 weeks) presented originally to the ED regarding generalized weakness x1.5 weeks. Found to be severely neutrapenic with tephlitis. Also with C diff.  Periodically over the last few months has had chest pain/uncomfortableness and some SOB and echocardiogram has been performed (1st at [facility] and most recently at Dr. Mcclain's office ([facility]  She was found to have had a pericardial effusion and was placed on diuretic in the mid portion of April.  She finished the RX 1.5 weeks ago and the became sick with her current issues.  Currently denies lightheadedness, dizziness or symptoms that affect her change in position.  Denies Chest pain or SOB.  Did have some tachycardia last night and EKG so far has shown NSR with APC's.                                                           PAST MEDICAL & SURGICAL HISTORY:  MVP (mitral valve prolapse)  Raynaud's disease  Squamous cell carcinoma: multiple extensive lesions of torso-all extremities-face/scalp  Hypertension  Hyperlipidemia  Polycystic kidney disease  Fibroid uterus  H/O lumpectomy: left  History of incisional hernia repair  History of kidney transplant: left  History of Mohs surgery for squamous cell carcinoma of skin: recent left arm procedure-multiple Moh&#x27;s procedure in the past    SOCIAL HISTORY: Non-Smoker/Social ETOH/ No Ilicit Drug use.    FAMILY HISTORY:  No pertinent family history in first degree relatives      Allergies  Bacitracin (Rash)  Levaquin (Sedation/Somnol; Muscle Pain)  tetracyclines (Other)    HOME MEDICATIONS:   Home Medications:  amLODIPine 5 mg oral tablet: 1 tab(s) orally once a day (05 May 2018 01:22)  aspirin 81 mg oral tablet: 1 tab(s) orally once a day (05 May 2018 01:22)  Klor-Con 10 oral tablet, extended release: 1 tab(s) orally once a day (05 May 2018 01:22)  Lasix 40 mg oral tablet: 1 tab(s) orally once a day (05 May 2018 01:22)  losartan 100 mg oral tablet: 1 tab(s) orally once a day (05 May 2018 01:22)  predniSONE 5 mg oral tablet: 1 tab(s) orally once a day (05 May 2018 01:22)  simvastatin 20 mg oral tablet: 1 tab(s) orally once a day (at bedtime) (05 May 2018 01:22)      HOSPITAL MEDICATIONS:   MEDICATIONS  (STANDING):  cefepime  Injectable. 1000 milliGRAM(s) IV Push every 12 hours  DAPTOmycin IVPB 200 milliGRAM(s) IV Intermittent every 24 hours  metoprolol tartrate 50 milliGRAM(s) Oral two times a day  metroNIDAZOLE  IVPB 500 milliGRAM(s) IV Intermittent every 8 hours  mupirocin 2% Ointment 1 Application(s) Topical two times a day  potassium acid phosphate/sodium acid phosphate tablet (K-PHOS No. 2) 1 Tablet(s) Oral four times a day with meals  predniSONE   Tablet 5 milliGRAM(s) Oral daily  simvastatin 20 milliGRAM(s) Oral at bedtime  sodium chloride 0.9%. 1000 milliLiter(s) (50 mL/Hr) IV Continuous <Continuous>  vancomycin    Solution 125 milliGRAM(s) Oral every 6 hours    MEDICATIONS  (PRN):  acetaminophen   Tablet 650 milliGRAM(s) Oral every 6 hours PRN For Temp greater than 38 C (100.4 F)      REVIEW OF SYSTEMS: 13 systems were reviewed and all negative except for comments above.    Vital Signs Last 24 Hrs  T(C): 36.6 (07 May 2018 15:10), Max: 37.1 (07 May 2018 04:32)  T(F): 97.8 (07 May 2018 15:10), Max: 98.8 (07 May 2018 04:32)  HR: 97 (07 May 2018 15:10) (67 - 120)  BP: 126/74 (07 May 2018 15:10) (120/75 - 136/71)  No pulse paradoxus on my manual blood pressure measurement (130/70).  BP(mean): --  RR: 16 (07 May 2018 15:10) (16 - 18)  SpO2: 97% (07 May 2018 15:10) (93% - 97%)Daily     Daily I&O's Summary    06 May 2018 07:01  -  07 May 2018 07:00  --------------------------------------------------------  IN: 950 mL / OUT: 2650 mL / NET: -1700 mL    07 May 2018 07:01  -  07 May 2018 16:33  --------------------------------------------------------  IN: 1000 mL / OUT: 0 mL / NET: 1000 mL        PHYSICAL EXAM:  Constitutional: NAD, awake and alert, well-developed, sitting upright in hospital bed.  HEENT: PERRLA, EOMI,  No oral cyanosis. Oropharynx Clean and Dry.  Neck:  supple,  JVP 7-8 without Kusmale's sign, No Thyroid enlargement. No Carotid Bruits bilaterally.  Respiratory: Breath sounds are diminshed, No wheezing, rales or rhonchi  Cardiovascular: NL S1 and S2, IR IR, 1/6 MYRIAM LLSB.   Gastrointestinal: Bowel Sounds present, soft, NT, ND   Extremities: No peripheral edema. No clubbing or cyanosis.    Vascular: 1+ peripheral pulses in LE   Neurological: A/O x 3, no focal motor deficits  Musculoskeletal: lessions on LE Bandaged and being treated for infection.  Skin: Lessions on both lower extremity.      LABS: All Labs Reviewed:                        9.4    0.61  )-----------( 16       ( 07 May 2018 06:38 )             29.4                         8.8    0.28  )-----------( 19       ( 06 May 2018 06:00 )             26.9                         9.6    0.24  )-----------( 39       ( 05 May 2018 06:30 )             28.7     07 May 2018 06:38    141    |  112    |  24     ----------------------------<  94     3.4     |  18     |  1.24   05 May 2018 06:30    138    |  107    |  38     ----------------------------<  90     4.2     |  19     |  1.23   04 May 2018 19:58    137    |  105    |  46     ----------------------------<  100    5.1     |  21     |  1.45     Ca    8.4        07 May 2018 06:38  Ca    8.0        05 May 2018 06:30  Ca    8.9        04 May 2018 19:58  Phos  1.5       07 May 2018 06:38  Mg     1.6       07 May 2018 06:38    TPro  6.5    /  Alb  3.0    /  TBili  0.8    /  DBili  x      /  AST  20     /  ALT  15     /  AlkPhos  76     04 May 2018 19:58    RADIOLOGY:  < from: Xray Chest 1 View AP/PA. (05.04.18 @ 21:38) >  PROCEDURE DATE:  05/04/2018          INTERPRETATION:  History: Fever.    AP view of the chest was obtained.    No prior studies available for comparison.     Findings:     The lungs are clear. Heart size is mildly enlarged. The visualized   osseous structures are unremarkable. Surgical clips are noted within the   left neck soft tissues    Impression: Mild cardiomegaly.    < end of copied text >  < from: CT Abdomen and Pelvis w/ Oral Cont (05.07.18 @ 14:35) >  Cecal wall thickening is suggested of typhlitis in a patient with   neutropenia.    Polycystic kidney and liver disease.    1.7 cm rim calcified structure at the transplant kidney hilum may   represent a transplant renal artery aneurysm.    Retroperitoneal lymphadenopathy.    2.8 cm in length circumferential wall thickening in a mid small bowel   loop. Primary small bowel neoplasm is considered. No upstream bowel   dilatation.    Small bilateral pleural effusions and small pericardial effusion.    < end of copied text >    EKG:  < from: 12 Lead ECG (05.06.18 @ 20:16) >  Normal sinus rhythm  frequent APCs  Lowvoltage QRS  Cannot rule out Anterior infarct    < end of copied text >    ECHO:  < from: Transthoracic Echocardiogram (05.07.18 @ 14:29) >  Summary     The left ventricle is normal in size, wall thickness, wall motion and   contractility.   Estimated left ventricular ejection fraction is 50-55 %.   The left atrium is mildly dilated.   Normal appearing right atrium.   Normal appearing right ventricle structure and function.   Fibrocalcific changes noted to the Aortic valve leaflets with preserved   leaflet excursion.   Mild (1+) aortic regurgitation is present.   Fibrocalcific changes noted to the mitral valve leaflets with preserved   leaflet excursion.   Mild (1+) mitral regurgitation is present.   Mild (1+) tricuspid valve regurgitation is present.   Mild to moderate pulmonary hypertension.   A moderate-sized pericardial effusion is present.    < end of copied text >

## 2018-05-10 LAB
ANION GAP SERPL CALC-SCNC: 8 MMOL/L — SIGNIFICANT CHANGE UP (ref 5–17)
BASOPHILS # BLD AUTO: 0 K/UL — SIGNIFICANT CHANGE UP (ref 0–0.2)
BASOPHILS NFR BLD AUTO: 0 % — SIGNIFICANT CHANGE UP (ref 0–2)
BUN SERPL-MCNC: 20 MG/DL — SIGNIFICANT CHANGE UP (ref 7–23)
CALCIUM SERPL-MCNC: 8.7 MG/DL — SIGNIFICANT CHANGE UP (ref 8.5–10.1)
CHLORIDE SERPL-SCNC: 113 MMOL/L — HIGH (ref 96–108)
CO2 SERPL-SCNC: 20 MMOL/L — LOW (ref 22–31)
CREAT SERPL-MCNC: 1.15 MG/DL — SIGNIFICANT CHANGE UP (ref 0.5–1.3)
CULTURE RESULTS: SIGNIFICANT CHANGE UP
EOSINOPHIL # BLD AUTO: 0.06 K/UL — SIGNIFICANT CHANGE UP (ref 0–0.5)
EOSINOPHIL NFR BLD AUTO: 1 % — SIGNIFICANT CHANGE UP (ref 0–6)
GLUCOSE SERPL-MCNC: 85 MG/DL — SIGNIFICANT CHANGE UP (ref 70–99)
HCT VFR BLD CALC: 28.8 % — LOW (ref 34.5–45)
HGB BLD-MCNC: 9.4 G/DL — LOW (ref 11.5–15.5)
LYMPHOCYTES # BLD AUTO: 0.72 K/UL — LOW (ref 1–3.3)
LYMPHOCYTES # BLD AUTO: 12 % — LOW (ref 13–44)
MAGNESIUM SERPL-MCNC: 1.6 MG/DL — SIGNIFICANT CHANGE UP (ref 1.6–2.6)
MANUAL SMEAR VERIFICATION: SIGNIFICANT CHANGE UP
MCHC RBC-ENTMCNC: 30.4 PG — SIGNIFICANT CHANGE UP (ref 27–34)
MCHC RBC-ENTMCNC: 32.6 GM/DL — SIGNIFICANT CHANGE UP (ref 32–36)
MCV RBC AUTO: 93.2 FL — SIGNIFICANT CHANGE UP (ref 80–100)
METAMYELOCYTES # FLD: 2 % — HIGH (ref 0–0)
MONOCYTES # BLD AUTO: 0.18 K/UL — SIGNIFICANT CHANGE UP (ref 0–0.9)
MONOCYTES NFR BLD AUTO: 3 % — SIGNIFICANT CHANGE UP (ref 2–14)
MYELOCYTES NFR BLD: 2 % — HIGH (ref 0–0)
NEUTROPHILS # BLD AUTO: 4.78 K/UL — SIGNIFICANT CHANGE UP (ref 1.8–7.4)
NEUTROPHILS NFR BLD AUTO: 75 % — SIGNIFICANT CHANGE UP (ref 43–77)
NEUTS BAND # BLD: 5 % — SIGNIFICANT CHANGE UP (ref 0–8)
NRBC # BLD: 2 /100 — HIGH (ref 0–0)
NRBC # BLD: SIGNIFICANT CHANGE UP /100 WBCS (ref 0–0)
PHOSPHATE SERPL-MCNC: 2 MG/DL — LOW (ref 2.5–4.5)
PLAT MORPH BLD: NORMAL — SIGNIFICANT CHANGE UP
PLATELET # BLD AUTO: 30 K/UL — LOW (ref 150–400)
POTASSIUM SERPL-MCNC: 3.7 MMOL/L — SIGNIFICANT CHANGE UP (ref 3.5–5.3)
POTASSIUM SERPL-SCNC: 3.7 MMOL/L — SIGNIFICANT CHANGE UP (ref 3.5–5.3)
RBC # BLD: 3.09 M/UL — LOW (ref 3.8–5.2)
RBC # FLD: 15.9 % — HIGH (ref 10.3–14.5)
RBC BLD AUTO: SIGNIFICANT CHANGE UP
SODIUM SERPL-SCNC: 141 MMOL/L — SIGNIFICANT CHANGE UP (ref 135–145)
SPECIMEN SOURCE: SIGNIFICANT CHANGE UP
WBC # BLD: 5.97 K/UL — SIGNIFICANT CHANGE UP (ref 3.8–10.5)
WBC # FLD AUTO: 5.97 K/UL — SIGNIFICANT CHANGE UP (ref 3.8–10.5)

## 2018-05-10 PROCEDURE — 99232 SBSQ HOSP IP/OBS MODERATE 35: CPT

## 2018-05-10 RX ORDER — SODIUM,POTASSIUM PHOSPHATES 278-250MG
1 POWDER IN PACKET (EA) ORAL
Qty: 0 | Refills: 0 | Status: COMPLETED | OUTPATIENT
Start: 2018-05-10 | End: 2018-05-10

## 2018-05-10 RX ORDER — LOSARTAN POTASSIUM 100 MG/1
25 TABLET, FILM COATED ORAL DAILY
Qty: 0 | Refills: 0 | Status: DISCONTINUED | OUTPATIENT
Start: 2018-05-10 | End: 2018-05-10

## 2018-05-10 RX ORDER — MAGNESIUM OXIDE 400 MG ORAL TABLET 241.3 MG
400 TABLET ORAL
Qty: 0 | Refills: 0 | Status: DISCONTINUED | OUTPATIENT
Start: 2018-05-10 | End: 2018-05-11

## 2018-05-10 RX ADMIN — Medication 50 MILLIGRAM(S): at 08:40

## 2018-05-10 RX ADMIN — MAGNESIUM OXIDE 400 MG ORAL TABLET 400 MILLIGRAM(S): 241.3 TABLET ORAL at 12:54

## 2018-05-10 RX ADMIN — SIMVASTATIN 20 MILLIGRAM(S): 20 TABLET, FILM COATED ORAL at 22:06

## 2018-05-10 RX ADMIN — Medication 100 MILLIGRAM(S): at 05:17

## 2018-05-10 RX ADMIN — Medication 50 MILLIGRAM(S): at 17:51

## 2018-05-10 RX ADMIN — Medication 5 MILLIGRAM(S): at 05:17

## 2018-05-10 RX ADMIN — Medication 1 TABLET(S): at 22:07

## 2018-05-10 RX ADMIN — Medication 1 TABLET(S): at 12:54

## 2018-05-10 RX ADMIN — CEFEPIME 1000 MILLIGRAM(S): 1 INJECTION, POWDER, FOR SOLUTION INTRAMUSCULAR; INTRAVENOUS at 05:17

## 2018-05-10 RX ADMIN — Medication 125 MILLIGRAM(S): at 23:06

## 2018-05-10 RX ADMIN — Medication 1 TABLET(S): at 17:51

## 2018-05-10 RX ADMIN — MUPIROCIN 1 APPLICATION(S): 20 OINTMENT TOPICAL at 05:17

## 2018-05-10 RX ADMIN — Medication 125 MILLIGRAM(S): at 12:54

## 2018-05-10 RX ADMIN — Medication 125 MILLIGRAM(S): at 17:51

## 2018-05-10 RX ADMIN — Medication 125 MILLIGRAM(S): at 05:17

## 2018-05-10 RX ADMIN — MAGNESIUM OXIDE 400 MG ORAL TABLET 400 MILLIGRAM(S): 241.3 TABLET ORAL at 17:51

## 2018-05-10 RX ADMIN — MUPIROCIN 1 APPLICATION(S): 20 OINTMENT TOPICAL at 17:52

## 2018-05-10 NOTE — PROGRESS NOTE ADULT - SUBJECTIVE AND OBJECTIVE BOX
CC: Sent in by Dr. 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 Pt reports a fever (100.8 Tmax) this afternoon. +rash on RLE. Pt notes loose stools x3 days. Pt was constipated recently and given stool softener by oncologist. No cough, CP, SOB, abd pain, urinary complaints. (05 May 2018 03:08)    INTERVAL HPI/ OVERNIGHT EVENTS:  Pt was seen and examined, feeling better, no fevers or chills, still some loose stools, had 3 today.  No abd pain.  POC discussed in details     REVIEW OF SYSTEMS:  All other review of systems is negative unless indicated above.      PHYSICAL EXAM:  General: Well developed; malnourished; in no acute distress  Eyes: PERRLA, EOMI; conjunctiva and sclera clear  Head: Normocephalic; atraumatic  ENMT: No nasal discharge; airway clear  Neck: Supple; non tender; no masses  Respiratory: Good air entry, No  rales or rhonchi  Cardiovascular: Regular rate and rhythm. S1 and S2 Normal; No murmurs  Gastrointestinal: Soft, non-tender, not  distended; Normal bowel sounds  Genitourinary: No costovertebral angle tenderness  Extremities: Normal range of motion, No  edema. Multiple LE skin lesions, RLE with dressing, intact   Vascular: Peripheral pulses palpable 2+ bilaterally  Neurological: Alert and oriented x4, non focal   Skin: Warm and dry. No acute rash  Lymph Nodes: No acute cervical adenopathy  Musculoskeletal: Normal muscle tone, without deformities  Psychiatric: Cooperative and appropriate        LABS:                         8.5    2.27  )-----------( 27       ( 09 May 2018 05:34 )             26.2     05-    144  |  115<H>  |  24<H>  ----------------------------<  83  3.4<L>   |  19<L>  |  1.22    Ca    8.5      09 May 2018 05:34  Phos  1.8     05-  Mg     1.5     -09                          9.1    0.98  )-----------( 7        ( 08 May 2018 07:02 )             28.3     05-08    141  |  112<H>  |  26<H>  ----------------------------<  88  3.6   |  19<L>  |  1.21                            9.4    0.61  )-----------( 16       ( 07 May 2018 06:38 )             29.4     05-07    141  |  112<H>  |  24<H>  ----------------------------<  94  3.4<L>   |  18<L>  |  1.24    Ca    8.4<L>      07 May 2018 06:38  Phos  1.5     05-07  Mg     1.6                                   8.8    0.28  )-----------( 19       ( 06 May 2018 06:00 )             26.9     05-05    138  |  107  |  38<H>  ----------------------------<  90  4.2   |  19<L>  |  1.23    Ca    8.0<L>      05 May 2018 06:30      Urinalysis Basic - ( 04 May 2018 19:58 )    Color: Yellow / Appearance: Clear / S.010 / pH: x  Gluc: x / Ketone: Negative  / Bili: Negative / Urobili: Negative mg/dL   Blood: x / Protein: 100 mg/dL / Nitrite: Negative   Leuk Esterase: Small / RBC: 6-10 /HPF / WBC 6-10   Sq Epi: x / Non Sq Epi: Moderate / Bacteria: Moderate    Culture - Urine (18 @ 19:58)    Specimen Source: .Urine None    Culture Results:   <10,000 CFU/ml Normal Urogenital frank present      Culture - Blood (18 @ 19:58)    -  Klebsiella pneumoniae: Detec    Gram Stain:   Growth in anaerobic bottle: Gram Negative Rods    -  Amikacin: S <=8    -  Ampicillin: R >16 These ampicillin results predict results for amoxicillin    -  Ampicillin/Sulbactam: S 8/4    -  Aztreonam: S <=4    -  Cefazolin: S <=2    -  Cefepime: S <=2    -  Cefoxitin: I 16    -  Ceftriaxone: S <=1 Enterobacter, Citrobacter, and Serratia may develop resistance during prolonged therapy    -  Ciprofloxacin: S <=0.5    -  Ertapenem: S <=0.5    -  Gentamicin: S <=1    -  Imipenem: S <=1    -  Levofloxacin: S <=1    -  Meropenem: S <=1    -  Piperacillin/Tazobactam: S <=8    -  Tobramycin: S <=2    -  Trimethoprim/Sulfamethoxazole: S <=0.5/9.5    Specimen Source: .Blood None    Organism: Blood Culture PCR    Organism: Klebsiella pneumoniae    Culture Results:   Growth in anaerobic bottle: Klebsiella pneumoniae  Culture - Blood in AM (18 @ 07:02)    Specimen Source: .Blood None    Culture Results:   No growth to date.    Culture - Blood in AM (18 @ 07:07)    Specimen Source: .Blood None    Culture Results:   No growth to date.        MEDICATIONS  (STANDING):  cefepime  Injectable. 1000 milliGRAM(s) IV Push every 12 hours  DAPTOmycin IVPB 200 milliGRAM(s) IV Intermittent every 24 hours  metoprolol tartrate 50 milliGRAM(s) Oral two times a day  metroNIDAZOLE  IVPB 500 milliGRAM(s) IV Intermittent every 8 hours  mupirocin 2% Ointment 1 Application(s) Topical two times a day  predniSONE   Tablet 5 milliGRAM(s) Oral daily  simvastatin 20 milliGRAM(s) Oral at bedtime  sodium chloride 0.9%. 1000 milliLiter(s) (50 mL/Hr) IV Continuous <Continuous>  vancomycin    Solution 125 milliGRAM(s) Oral every 6 hours    MEDICATIONS  (PRN):  acetaminophen   Tablet 650 milliGRAM(s) Oral every 6 hours PRN For Temp greater than 38 C (100.4 F)  milliGRAM(s) Oral every 6 hours PRN For Temp greater than 38 C (100.4 F)      RADIOLOGY & ADDITIONAL TESTS:          PROCEDURE DATE:  2018    INTERPRETATION:  CT ABDOMEN AND PELVIS OC    Findings:  LIVER: Polycystic liver.  SPLEEN: Normal.  PANCREAS: Normal.  GALLBLADDER/BILIARY TREE: Nondilated. Normal gallbladder.  ADRENALS: Normal.  KIDNEYS: Enlarged, polycystic native kidneys. Left iliac fossa renal   allograft shows no hydronephrosis or perinephric collection. 1.7 cm rim   calcified structure at the left transplant kidney hilum may represent a   transplant renal artery aneurysm.  LYMPHADENOPATHY/RETROPERITONEUM: Retroperitoneal lymphadenopathy.  VASCULATURE: Normal caliber aorta.  BOWEL: Focal wall thickening in the cecum. Normal appendix. 2.8 cm in   length circumferential wall thickening in a mid small bowel loop   (coronal; 70)  PELVIC VISCERA: No uterine or adnexal abnormality. Pessary device in   place.  PELVIC LYMPH NODES: No pelvic adenopathy.  PERITONEUM/ABDOMINAL WALL: No free air or ascites. Left abdominal wall   mesh without recurrent hernia.  SKELETAL: No aggressive lesion.  LUNG BASES: Small bilateral pleural effusions and small pericardial   effusion.    IMPRESSION:   Cecal wall thickening is suggested of typhlitis in a patient with   neutropenia.  Polycystic kidney and liver disease.  1.7 cm rim calcified structure at the transplant kidney hilum may   represent a transplant renal artery aneurysm.  Retroperitoneal lymphadenopathy.  2.8 cm in length circumferential wall thickening in a mid small bowel   loop. Primary small bowel neoplasm is considered. No upstream bowel   dilatation.    Small bilateral pleural effusions and small pericardial effusion.          EXAM:  XR CHEST AP OR PA 1V                        PROCEDURE DATE:  2018    Findings:     The lungs are clear. Heart size is mildly enlarged. The visualized   osseous structures are unremarkable. Surgical clips are noted within the   left neck soft tissues    Impression: Mild cardiomegaly.      EXAM:  US DPLX LWR EXT VEINS COMPL BI                        PROCEDURE DATE:  2018        There is no sonographic evidence for deep vein thrombosis in the in the   common femoral, femoral, and popliteal veins of the right and left leg.   The veins are patent compressible with normal venous waveforms and   augmentation. Calf veins are patent to the level of the proximal   posterior tibial vein.    No abnormal fluid collection is noted in the popliteal region.    IMPRESSION: Normal lower extremity venous Doppler of the femoral and   popliteal veins of the right and left leg to the level of the proximal   posterior tibial vein. CC: Sent in by Dr. 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 Pt reports a fever (100.8 Tmax) this afternoon. +rash on RLE. Pt notes loose stools x3 days. Pt was constipated recently and given stool softener by oncologist. No cough, CP, SOB, abd pain, urinary complaints. (05 May 2018 03:08)    INTERVAL HPI/ OVERNIGHT EVENTS:  Pt was seen and examined, continues to improve, stool soft, no fevers. Reports L thigh swelling.   at bedside, POC discussed     Vital Signs Last 24 Hrs  T(C): 36.4 (10 May 2018 13:22), Max: 36.7 (09 May 2018 21:42)  T(F): 97.5 (10 May 2018 13:22), Max: 98.1 (09 May 2018 21:42)  HR: 81 (10 May 2018 18:03) (51 - 122)  BP: 148/59 (10 May 2018 18:03) (116/62 - 148/63)  RR: 16 (10 May 2018 18:03) (16 - 17)  SpO2: 92% (10 May 2018 13:22) (92% - 97%)    REVIEW OF SYSTEMS:  All other review of systems is negative unless indicated above.      PHYSICAL EXAM:  General: Well developed; malnourished; in no acute distress  Eyes: PERRLA, EOMI; conjunctiva and sclera clear  Head: Normocephalic; atraumatic  ENMT: No nasal discharge; airway clear  Neck: Supple; non tender; no masses  Respiratory: Good air entry, No  rales or rhonchi  Cardiovascular: Regular rate and rhythm. S1 and S2 Normal; No murmurs  Gastrointestinal: Soft, non-tender, not  distended; Normal bowel sounds  Genitourinary: No costovertebral angle tenderness  Extremities: Normal range of motion, No  edema. Multiple LE skin lesions, RLE with dressing, intact. LLE edema more medial thigh, non tender,  no erythema   Vascular: Peripheral pulses palpable 2+ bilaterally  Neurological: Alert and oriented x4, non focal   Skin: Warm and dry. No acute rash  Lymph Nodes: No acute cervical adenopathy  Musculoskeletal: Normal muscle tone, without deformities  Psychiatric: Cooperative and appropriate        LABS:                         9.4    5.97  )-----------( 30       ( 10 May 2018 05:42 )             28.8     05-10    141  |  113<H>  |  20  ----------------------------<  85  3.7   |  20<L>  |  1.15    Ca    8.7      10 May 2018 05:42  Phos  2.0     05-10  Mg     1.6     05-10                                8.5    2.27  )-----------( 27       ( 09 May 2018 05:34 )             26.2     05-09    144  |  115<H>  |  24<H>  ----------------------------<  83  3.4<L>   |  19<L>  |  1.22    Ca    8.5      09 May 2018 05:34  Phos  1.8     05-09  Mg     1.5     05-09                          9.1    0.98  )-----------( 7        ( 08 May 2018 07:02 )             28.3     05-08    141  |  112<H>  |  26<H>  ----------------------------<  88  3.6   |  19<L>  |  1.21                            9.4    0.61  )-----------( 16       ( 07 May 2018 06:38 )             29.4     05-07    141  |  112<H>  |  24<H>  ----------------------------<  94  3.4<L>   |  18<L>  |  1.24    Ca    8.4<L>      07 May 2018 06:38  Phos  1.5     05-07  Mg     1.6     05-07                              8.8    0.28  )-----------( 19       ( 06 May 2018 06:00 )             26.9     05-05    138  |  107  |  38<H>  ----------------------------<  90  4.2   |  19<L>  |  1.23    Ca    8.0<L>      05 May 2018 06:30      Urinalysis Basic - ( 04 May 2018 19:58 )    Color: Yellow / Appearance: Clear / S.010 / pH: x  Gluc: x / Ketone: Negative  / Bili: Negative / Urobili: Negative mg/dL   Blood: x / Protein: 100 mg/dL / Nitrite: Negative   Leuk Esterase: Small / RBC: 6-10 /HPF / WBC 6-10   Sq Epi: x / Non Sq Epi: Moderate / Bacteria: Moderate    Culture - Urine (18 @ 19:58)    Specimen Source: .Urine None    Culture Results:   <10,000 CFU/ml Normal Urogenital frank present      Culture - Blood (18 @ 19:58)    -  Klebsiella pneumoniae: Detec    Gram Stain:   Growth in anaerobic bottle: Gram Negative Rods    -  Amikacin: S <=8    -  Ampicillin: R >16 These ampicillin results predict results for amoxicillin    -  Ampicillin/Sulbactam: S 8/4    -  Aztreonam: S <=4    -  Cefazolin: S <=2    -  Cefepime: S <=2    -  Cefoxitin: I 16    -  Ceftriaxone: S <=1 Enterobacter, Citrobacter, and Serratia may develop resistance during prolonged therapy    -  Ciprofloxacin: S <=0.5    -  Ertapenem: S <=0.5    -  Gentamicin: S <=1    -  Imipenem: S <=1    -  Levofloxacin: S <=1    -  Meropenem: S <=1    -  Piperacillin/Tazobactam: S <=8    -  Tobramycin: S <=2    -  Trimethoprim/Sulfamethoxazole: S <=0.5/9.5    Specimen Source: .Blood None    Organism: Blood Culture PCR    Organism: Klebsiella pneumoniae    Culture Results:   Growth in anaerobic bottle: Klebsiella pneumoniae  Culture - Blood in AM (18 @ 07:02)    Specimen Source: .Blood None    Culture Results:   No growth to date.    Culture - Blood in AM (18 @ 07:07)    Specimen Source: .Blood None    Culture Results:   No growth to date.        MEDICATIONS  (STANDING):  cefepime  Injectable. 1000 milliGRAM(s) IV Push every 12 hours  DAPTOmycin IVPB 200 milliGRAM(s) IV Intermittent every 24 hours  metoprolol tartrate 50 milliGRAM(s) Oral two times a day  metroNIDAZOLE  IVPB 500 milliGRAM(s) IV Intermittent every 8 hours  mupirocin 2% Ointment 1 Application(s) Topical two times a day  predniSONE   Tablet 5 milliGRAM(s) Oral daily  simvastatin 20 milliGRAM(s) Oral at bedtime  sodium chloride 0.9%. 1000 milliLiter(s) (50 mL/Hr) IV Continuous <Continuous>  vancomycin    Solution 125 milliGRAM(s) Oral every 6 hours    MEDICATIONS  (PRN):  acetaminophen   Tablet 650 milliGRAM(s) Oral every 6 hours PRN For Temp greater than 38 C (100.4 F)  milliGRAM(s) Oral every 6 hours PRN For Temp greater than 38 C (100.4 F)      RADIOLOGY & ADDITIONAL TESTS:          PROCEDURE DATE:  2018    INTERPRETATION:  CT ABDOMEN AND PELVIS OC    Findings:  LIVER: Polycystic liver.  SPLEEN: Normal.  PANCREAS: Normal.  GALLBLADDER/BILIARY TREE: Nondilated. Normal gallbladder.  ADRENALS: Normal.  KIDNEYS: Enlarged, polycystic native kidneys. Left iliac fossa renal   allograft shows no hydronephrosis or perinephric collection. 1.7 cm rim   calcified structure at the left transplant kidney hilum may represent a   transplant renal artery aneurysm.  LYMPHADENOPATHY/RETROPERITONEUM: Retroperitoneal lymphadenopathy.  VASCULATURE: Normal caliber aorta.  BOWEL: Focal wall thickening in the cecum. Normal appendix. 2.8 cm in   length circumferential wall thickening in a mid small bowel loop   (coronal; 70)  PELVIC VISCERA: No uterine or adnexal abnormality. Pessary device in   place.  PELVIC LYMPH NODES: No pelvic adenopathy.  PERITONEUM/ABDOMINAL WALL: No free air or ascites. Left abdominal wall   mesh without recurrent hernia.  SKELETAL: No aggressive lesion.  LUNG BASES: Small bilateral pleural effusions and small pericardial   effusion.    IMPRESSION:   Cecal wall thickening is suggested of typhlitis in a patient with   neutropenia.  Polycystic kidney and liver disease.  1.7 cm rim calcified structure at the transplant kidney hilum may   represent a transplant renal artery aneurysm.  Retroperitoneal lymphadenopathy.  2.8 cm in length circumferential wall thickening in a mid small bowel   loop. Primary small bowel neoplasm is considered. No upstream bowel   dilatation.    Small bilateral pleural effusions and small pericardial effusion.          EXAM:  XR CHEST AP OR PA 1V                        PROCEDURE DATE:  2018    Findings:     The lungs are clear. Heart size is mildly enlarged. The visualized   osseous structures are unremarkable. Surgical clips are noted within the   left neck soft tissues    Impression: Mild cardiomegaly.      EXAM:  US DPLX LWR EXT VEINS COMPL BI                        PROCEDURE DATE:  2018        There is no sonographic evidence for deep vein thrombosis in the in the   common femoral, femoral, and popliteal veins of the right and left leg.   The veins are patent compressible with normal venous waveforms and   augmentation. Calf veins are patent to the level of the proximal   posterior tibial vein.    No abnormal fluid collection is noted in the popliteal region.    IMPRESSION: Normal lower extremity venous Doppler of the femoral and   popliteal veins of the right and left leg to the level of the proximal   posterior tibial vein.

## 2018-05-10 NOTE — PROGRESS NOTE ADULT - ASSESSMENT
68 y/o F with a PMHx of HTN, PCKD w  CKD 3  Cr ~ 1.5 - 1.6 w hx of renal transplant off all immunosupressants since March due to widespread Merkel cell carcinoma on PET Scan now s/p chemo 1.5 weeks ago), HLD, HTN, admitted for:     Fever /Sepsis/ Neutropenia - kelbseilla bactaremia   - IV Abx as per Dr Erazo : On Daptomycin and Cefepime   - po vanco - for hx of cdiff per ID    - CT noted - ? typhilitis    CKD 3 -    renal transplant off immunosuppression since March due to active metastatic cancer    continue prednisone therapy    monitor cr daily    no nsaid's   - no NSAID's   - hold ACE or ARB     Tachyarrythmia    - Dr Patterson - cardiology noted - Holter in place and metoprolol dose increased    - hx of pleural and pericardial effusions/SOB   - holding  po lasix home dose - may need to resume on discharge     merkels's  +  [place] [facility] [place]    Hypokalemia and Hypophos - due to diarrhea    - replete with oral supplements as needed 68 y/o F with a PMHx of HTN, PCKD w  CKD 3  Cr ~ 1.5 - 1.6 w hx of renal transplant off all immunosupressants since March due to widespread Merkel cell carcinoma on PET Scan now s/p chemo 1.5 weeks ago), HLD, HTN, admitted for:     Fever /Sepsis/ Neutropenia - kelbseilla bactaremia   - IV Abx as per Dr Erazo : On Daptomycin and Cefepime   - po vanco - for hx of cdiff per ID    - CT noted - ? typhilitis    CKD 3 -    renal transplant off immunosuppression since March due to active metastatic cancer    continue prednisone therapy    monitor cr daily    no nsaid's   - no NSAID's    Tachyarrythmia /Effusions   - Dr Patterson - cardiology noted - Holter in place and metoprolol dose increased    - hx of pleural and pericardial effusions/SOB      - holding  po lasix home dose - may need to resume on discharge     merkels's  +  PET scan showing retrocardiac lesions - [facility] [place]    Hypokalemia and Hypophos - due to diarrhea    - replete with oral supplements as needed

## 2018-05-10 NOTE — PHYSICAL THERAPY INITIAL EVALUATION ADULT - PERTINENT HX OF CURRENT PROBLEM, REHAB EVAL
Pt. presents to ED for generalized weakness for 1.5 weeks, fever, rash on RLE and loose stools. Dopplers (-) for DVT, CT ab/pelvis: typhlitis, blood cultures (+) for Klebsiella pneumoniae

## 2018-05-10 NOTE — PROGRESS NOTE ADULT - ASSESSMENT
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 Notes a history of cdiff as well and had a loose bowel movement. No rectal pain.  1. Patient admitted with neutropenic fever, multiple skin lesions, many look superinfected and patient notes history of colonization with MRSA, also history of Cdiff in past  - found to have Klebsiella pneumoniae in blood cultures; most likely bacterial translocation from bowel secondary to neutropenia  - had stopped her immunosuppression for her kidney transplant a few weeks ago as well  - follow up cultures --repeat blood cultures have cleared  - iv hydration and supportive care   - serial cbc and monitor temperature   - day #6 daptomycin and cefepime  - day #5 iv flagyl as well given neutropenia  - day #6 po vancomycin to prevent cdiff colitis  - contact precautions  - neutropenia has resolved and blood cultures have cleared will switch antibiotics to augmentin 500 mg po q 12 hours for 9 more days and continue  po vancomycin for 14 more days  - patient will have metoprolol dose given now--discussed with rn  2. other issues:  Merkel cell carcinoma (chemo 1.5 weeks ago), HLD, HTN, s/p kidney transplant (17 years ago, off immunotherapy for last 6 weeks)   - per medicine

## 2018-05-10 NOTE — PROGRESS NOTE ADULT - SUBJECTIVE AND OBJECTIVE BOX
REASON FOR VISIT: Pericardial effusion    HPI:  68 y/o woman with Merkel cell carcinoma (chemo 1.5 weeks ago), HLD, HTN, polycystic kidney s/p kidney transplant, and chronic pericardial effusion admitted 5/4/18 with  pancytopenia and Klebsiella sepsis; cardiology consult requested due to pericardial effusion and intermittent tachycardia.    5/10/18:  Comfortable; no SOB or orthopnea.    MEDICATIONS  (STANDING):  amoxicillin  500 milliGRAM(s)/clavulanate 1 Tablet(s) Oral two times a day  magnesium oxide 400 milliGRAM(s) Oral two times a day with meals  metoprolol tartrate 50 milliGRAM(s) Oral two times a day  mupirocin 2% Ointment 1 Application(s) Topical two times a day  potassium acid phosphate/sodium acid phosphate tablet (K-PHOS No. 2) 1 Tablet(s) Oral three times a day with meals  predniSONE   Tablet 5 milliGRAM(s) Oral daily  simvastatin 20 milliGRAM(s) Oral at bedtime  vancomycin    Solution 125 milliGRAM(s) Oral every 6 hours    MEDICATIONS  (PRN):  acetaminophen   Tablet 650 milliGRAM(s) Oral every 6 hours PRN For Temp greater than 38 C (100.4 F)    Vital Signs Last 24 Hrs  T(C): 36.7 (10 May 2018 04:36), Max: 36.9 (09 May 2018 18:09)  T(F): 98 (10 May 2018 04:36), Max: 98.4 (09 May 2018 18:09)  HR: 122 (10 May 2018 08:20) (51 - 122)  BP: 148/63 (10 May 2018 04:36) (116/62 - 148/63)  RR: 16 (10 May 2018 04:36) (16 - 16)  SpO2: 95% (10 May 2018 04:36) (95% - 100%)    PHYSICAL EXAM:  Constitutional: Semirecumbent in bed, no distress  Respiratory: Nonlabored, breath sounds are clear  Cardiovascular: Regular, normal S1 and S2.   Extremities: No peripheral edema. No clubbing or cyanosis.    Psych:  Appropriate mood and affect    LABS:                    9.4    5.97  )-----------( 30       ( 10 May 2018 05:42 )             28.8     141  |  113<H>  |  20  ----------------------------<  85  3.7   |  20<L>  |  1.15    12 Lead ECG (05.06.18 @ 20:16):  Normal sinus rhythm  frequent APCs.  Low voltage QRS.  Cannot rule out Anterior infarct    Transthoracic Echocardiogram (05.07.18 @ 14:29) >  The left ventricle is normal in size, wall thickness, wall motion and contractility.  Estimated left ventricular ejection fraction is 50-55 %.  The left atrium is mildly dilated.  Normal appearing right ventricle structure and function.  Mild aortic regurgitation.  Mild mitral regurgitation.  Mild tricuspid valve regurgitation.  Mild to moderate pulmonary hypertension.  A moderate-sized pericardial effusion is present.

## 2018-05-10 NOTE — PROGRESS NOTE ADULT - SUBJECTIVE AND OBJECTIVE BOX
Patient is a 67y Female who reports no complaints overnight.    still having diarrhea     REVIEW OF SYSTEMS:    CONSTITUTIONAL: No weakness, fevers or chills  RESPIRATORY: No cough, wheezing, hemoptysis; No shortness of breath  CARDIOVASCULAR: No chest pain or palpitations  GENITOURINARY: No dysuria, frequency or hematuria  All other review of systems is negative unless indicated above.    MEDICATIONS  (STANDING):  amoxicillin  500 milliGRAM(s)/clavulanate 1 Tablet(s) Oral two times a day  magnesium oxide 400 milliGRAM(s) Oral two times a day with meals  metoprolol tartrate 50 milliGRAM(s) Oral two times a day  mupirocin 2% Ointment 1 Application(s) Topical two times a day  potassium acid phosphate/sodium acid phosphate tablet (K-PHOS No. 2) 1 Tablet(s) Oral three times a day with meals  predniSONE   Tablet 5 milliGRAM(s) Oral daily  simvastatin 20 milliGRAM(s) Oral at bedtime  vancomycin    Solution 125 milliGRAM(s) Oral every 6 hours    Vital Signs Last 24 Hrs  T(C): 36.4 (10 May 2018 13:22), Max: 36.9 (09 May 2018 18:09)  T(F): 97.5 (10 May 2018 13:22), Max: 98.4 (09 May 2018 18:09)  HR: 74 (10 May 2018 13:22) (51 - 122)  BP: 143/56 (10 May 2018 13:22) (116/62 - 148/63)  BP(mean): --  RR: 17 (10 May 2018 13:22) (16 - 17)  SpO2: 92% (10 May 2018 13:22) (92% - 100%)PHYSICAL EXAM:    Constitutional: NAD  HEENT: PERRLA, EOMI,  MMM  Neck: No LAD, No JVD  Respiratory: good aeration b/l  Cardiovascular: S1 and S2, RRR  Gastrointestinal: BS+, soft, NT/ND  Extremities: No peripheral edema  Neurological: A/O x 3, no focal deficits  Psychiatric: Normal mood, normal affect  : No Manning  Skin: No rashes  Access: Not applicable    LABS:                                           9.4    5.97  )-----------( 30       ( 10 May 2018 05:42 )             28.8                         8.5    2.27  )-----------( 27       ( 09 May 2018 05:34 )             26.2     141    |  113    |  20     ----------------------------<  85        10 May 2018 05:42  3.7     |  20     |  1.15     144    |  115    |  24     ----------------------------<  83        09 May 2018 05:34  3.4     |  19     |  1.22     141    |  112    |  26     ----------------------------<  88        08 May 2018 07:02  3.6     |  19     |  1.21     Ca    8.7        10 May 2018 05:42  Ca    8.5        09 May 2018 05:34    Phos  2.0       10 May 2018 05:42  Phos  1.8       09 May 2018 05:34    Mg     1.6       10 May 2018 05:42    Urine Studies:      RADIOLOGY & ADDITIONAL STUDIES:         EXAM:  2D ECHO FOLLOW UP AD         PROCEDURE DATE:  05/08/2018        INTERPRETATION:  Transthoracic Echocardiography Report (TTE)     Demographics     Patient name        JUICE CHU    Age           67 year(s)     Med Rec #           [id number]         Gender        Female     Account #           [id number]           Date of Birth 1950     Interpreting        Oh Clayton MD  Room Number   0003   Physician     Referring Physician DONNA LI MD  Sonographer   Jonathan Vargas,                                                    [facility]     Date of study       05/08/2018 01:23                       PM     Height              62.01 in          Weight        110.23 pounds    Type of Study:     TTE procedure: 2D Echo Follow UP AD     Study Location: 1NTechnical Quality: Limited study    Indications   1) I31.3 - Pericardial effusion noninflammatory    Doppler Measurements:      MV Peak E-Wave: 105 cm/s      MV Peak Gradient: 4.41 mmHg     Findings     Mitral Valve   Normal mitral valve as seen     Aortic Valve   Normal aortic valve as seen     Left Ventricle   Limited study to asses pericardial effusion.   Estimated left ventricular ejection fraction is 50-55 %.     Pericardial Effusion   A moderate-sized pericardial effusion is present.   No Significant changes since prior study.     Pleural Effusion   Pleural effusion cannot be ruled out.     Miscellaneous   IVC is collapsing with inspiration.    Comparative Statement  No Significant changes since prior study.     Impression     Summary     Limited study to asses pericardial effusion.   Estimated left ventricular ejection fraction is 50-55 %.   Normal aortic valve as seen   Normal mitral valve as seen   A moderate-sized pericardial effusion is present.   No Significant changes since prior study.     Signature     ----------------------------------------------------------------   Electronically signed by Oh Clayton MD(Interpreting   physician) on 05/08/2018 03:27 PM   ----------------------------------------------------------------    Valves     Mitral Valve     Peak E-Wave: 105 cm/s   Peak Gradient: 4.41 mmHg    Structures Patient is a 67y Female who reports no complaints overnight.    still having diarrhea     REVIEW OF SYSTEMS:    CONSTITUTIONAL: No weakness, fevers or chills  RESPIRATORY: No cough, wheezing, hemoptysis; No shortness of breath  CARDIOVASCULAR: No chest pain or palpitations  GENITOURINARY: No dysuria, frequency or hematuria  All other review of systems is negative unless indicated above.    MEDICATIONS  (STANDING):  amoxicillin  500 milliGRAM(s)/clavulanate 1 Tablet(s) Oral two times a day  magnesium oxide 400 milliGRAM(s) Oral two times a day with meals  metoprolol tartrate 50 milliGRAM(s) Oral two times a day  mupirocin 2% Ointment 1 Application(s) Topical two times a day  potassium acid phosphate/sodium acid phosphate tablet (K-PHOS No. 2) 1 Tablet(s) Oral three times a day with meals  predniSONE   Tablet 5 milliGRAM(s) Oral daily  simvastatin 20 milliGRAM(s) Oral at bedtime  vancomycin    Solution 125 milliGRAM(s) Oral every 6 hours    Vital Signs Last 24 Hrs  T(C): 36.4 (10 May 2018 13:22), Max: 36.9 (09 May 2018 18:09)  T(F): 97.5 (10 May 2018 13:22), Max: 98.4 (09 May 2018 18:09)  HR: 74 (10 May 2018 13:22) (51 - 122)  BP: 143/56 (10 May 2018 13:22) (116/62 - 148/63)  BP(mean): --  RR: 17 (10 May 2018 13:22) (16 - 17)  SpO2: 92% (10 May 2018 13:22) (92% - 100%)PHYSICAL EXAM:    Constitutional: NAD  HEENT: PERRLA, EOMI,  MMM  Neck: No LAD, No JVD  Respiratory: good aeration b/l  Cardiovascular: S1 and S2, RRR  Gastrointestinal: BS+, soft, NT/ND  Extremities:  peripheral edema +  Neurological: A/O x 3, no focal deficits  Psychiatric: Normal mood, normal affect  : No Manning  Skin: No rashes  Access: Not applicable    LABS:                                           9.4    5.97  )-----------( 30       ( 10 May 2018 05:42 )             28.8                         8.5    2.27  )-----------( 27       ( 09 May 2018 05:34 )             26.2     141    |  113    |  20     ----------------------------<  85        10 May 2018 05:42  3.7     |  20     |  1.15     144    |  115    |  24     ----------------------------<  83        09 May 2018 05:34  3.4     |  19     |  1.22     141    |  112    |  26     ----------------------------<  88        08 May 2018 07:02  3.6     |  19     |  1.21     Ca    8.7        10 May 2018 05:42  Ca    8.5        09 May 2018 05:34    Phos  2.0       10 May 2018 05:42  Phos  1.8       09 May 2018 05:34    Mg     1.6       10 May 2018 05:42    Urine Studies:      RADIOLOGY & ADDITIONAL STUDIES:         EXAM:  2D ECHO FOLLOW UP AD         PROCEDURE DATE:  05/08/2018        INTERPRETATION:  Transthoracic Echocardiography Report (TTE)     Demographics     Patient name        JUICE CHU    Age           67 year(s)     Med Rec #           [id number]         Gender        Female     Account #           [id number]           Date of Birth 1950     Interpreting        Oh Clayton MD  Room Number   0003   Physician     Referring Physician DONNA LI MD  Sonographer   Jonathan Vargas,                                                    [facility]     Date of study       05/08/2018 01:23                       PM     Height              62.01 in          Weight        110.23 pounds    Type of Study:     TTE procedure: 2D Echo Follow UP AD     Study Location: 1NTechnical Quality: Limited study    Indications   1) I31.3 - Pericardial effusion noninflammatory    Doppler Measurements:      MV Peak E-Wave: 105 cm/s      MV Peak Gradient: 4.41 mmHg     Findings     Mitral Valve   Normal mitral valve as seen     Aortic Valve   Normal aortic valve as seen     Left Ventricle   Limited study to asses pericardial effusion.   Estimated left ventricular ejection fraction is 50-55 %.     Pericardial Effusion   A moderate-sized pericardial effusion is present.   No Significant changes since prior study.     Pleural Effusion   Pleural effusion cannot be ruled out.     Miscellaneous   IVC is collapsing with inspiration.    Comparative Statement  No Significant changes since prior study.     Impression     Summary     Limited study to asses pericardial effusion.   Estimated left ventricular ejection fraction is 50-55 %.   Normal aortic valve as seen   Normal mitral valve as seen   A moderate-sized pericardial effusion is present.   No Significant changes since prior study.     Signature     ----------------------------------------------------------------   Electronically signed by Oh Clayton MD(Interpreting   physician) on 05/08/2018 03:27 PM   ----------------------------------------------------------------    Valves     Mitral Valve     Peak E-Wave: 105 cm/s   Peak Gradient: 4.41 mmHg    Structures

## 2018-05-10 NOTE — PROGRESS NOTE ADULT - ASSESSMENT
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 Fever.  Sepsis. Neutropenia. Klebsiella bacteremia   - found to have typhlitis  on CT abd/pelvis   - Neutropenic precautions   - CXR neg   - UA+,  UCX  neg   - BCX: + Klebsiela pneumonia in   anaerobic CX   - C/w IV Abxs as per ID : On Dapto and Cefepime, Flagyl   - On PO VAnco for C.Diff PPxs as pt has past history   - Stool Cdiff negative  - D/w DR Erazo, will await for repeat BCX          2.  CKD stage 3  h/o Renal transplant  Renal Fx is stable   Continue prednisone  Avoid nephrotoxic agents   D/w Renal     4. HTN  Monitor BP, stable   C/w amlodipine     5. Merkel cell carcinoma., recently Dz with mediastinal LAD and liver mets. Pancytopenia   On Chemo: Carboplatin and Etoposide   Pancytopenia likely due to chemo  Had Neulasta about 10 days ago   S/p Transfusion  Plts 1U   - Plts better this am, WBCs start improving, H/H stable   Off  ASA   Monitor cbc daily   CT abd/pelvis results noted for 2.8 cm circumferential small bowel wall thickening, as per radiology possible  primary neoplasm Pt will need to f/u with GI and her  oncologist for further evaluation when stable   Labs in am     6. Moderate pericardial effusion  asymptomatic   as per Pt had it since 2011 and followed by Cardio  at [facility]  ECHO was repeat today and looks stable  Pt hemodynamically stable, asymptomatic         7.  Episode of tachycardia and irregular HR  EKG: SR with APCs   Monitor placed   Will follow with cardio     8. Hypophosphatemia/Hypokalemia/Hypomagnesemia   replace PO/IC  recheck in am     9. DVT PPXs: Cannot give chemical Anticoagulation due to very low platelet.  Cannot SCD either due to extensive lesions in both legs. 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 Fever.  Sepsis. Neutropenia. Klebsiella bacteremia   - found to have typhlitis  on CT abd/pelvis   - May d/c  Neutropenic precautions, WBCs at normal levels   - CXR neg   - UA+,  UCX  neg   - BCX: + Klebsiela pneumonia in   anaerobic CX   - Repeat CX< NGTD   - C/w IV Abxs as per ID : On Dapto and Cefepime, Flagyl. Switched to PO Augmentin for 9 more days  - On PO VAnco for C.Diff PPxs as pt has past history, to complete total 14 days   - Stool Cdiff negative  - D/w DR Erazo        2.  CKD stage 3  h/o Renal transplant  Renal Fx is stable   Continue prednisone  Avoid nephrotoxic agents   D/w Renal     4. HTN  Monitor BP, stable   C/w amlodipine     5. Merkel cell carcinoma., recently Dz with mediastinal LAD and liver mets. Pancytopenia   On Chemo: Carboplatin and Etoposide   Pancytopenia likely due to chemo, improved, WBCs at normal level and PTLs trending up   Had Neulasta about 10 days ago   S/p Transfusion  Plts 1U    Off  ASA   Monitor cbc daily   CT abd/pelvis results noted for 2.8 cm circumferential small bowel wall thickening, as per radiology possible  primary neoplasm Pt will need to f/u with GI and her  oncologist for further evaluation when stable       6. Moderate pericardial effusion  asymptomatic   as per Pt had it since 2011 and followed by Cardio  at [facility]  ECHO was repeated and looks stable  Pt hemodynamically stable, asymptomatic   D/w DR Sanders would continue to hold diuretics         7.  Episode of tachycardia and irregular HR  EKG: SR with APCs   D/w Cardio, c/w BB     8. Hypophosphatemia/Hypokalemia/Hypomagnesemia   replace PO/  recheck in am     9. DVT PPXs: Cannot give chemical Anticoagulation due to very low platelet.  Cannot SCD either due to extensive lesions in both legs.     10. L thigh and  leg edema  - will check Doppler to R/o DVT     Dispo: labs in am if continues to improve, d/c planning home with HC

## 2018-05-10 NOTE — PROGRESS NOTE ADULT - PROBLEM SELECTOR PLAN 1
Chronic; stable on serial imaging; no clear symptoms attributable to effusion; outpatient f/u with Dr. Aguilar.  No indication at this time for intervention.  Pre-renal on presentation (while taking furosemide) - would continue to hold diuretic therapy.

## 2018-05-10 NOTE — PHYSICAL THERAPY INITIAL EVALUATION ADULT - ADDITIONAL COMMENTS
Pt. resides in [address] [facility] w/ no steps to enter. At baseline, pt. ambulates w/out an AD independently. Has a SAC at home.

## 2018-05-10 NOTE — PHYSICAL THERAPY INITIAL EVALUATION ADULT - LEVEL OF INDEPENDENCE: STAIR NEGOTIATION, REHAB EVAL
Pt. reports feeling confident pt. can ambulate steps at home. Unable to ambulate steps due to isolation precautions.

## 2018-05-10 NOTE — PROGRESS NOTE ADULT - SUBJECTIVE AND OBJECTIVE BOX
Patient is a 67y old  Female who presents with a chief complaint of Sent in by Dr. carole Roca for fever (05 May 2018 03:18)    Date of service: 05-10-18 @ 07:52  Patient notes that overnite her pulse was slow, her metoprolol was held; now with rapid heart rate; I took pulse---122 bpm; no chest pain or shortness of breath  ROS: no fever or chills; denies dizziness, no HA, no SOB or cough, no abdominal pain, no diarrhea or constipation; no dysuria, no urinary frequency, no legs pain, no rashes    MEDICATIONS  (STANDING):  cefepime  Injectable. 1000 milliGRAM(s) IV Push every 12 hours  DAPTOmycin IVPB 200 milliGRAM(s) IV Intermittent every 24 hours  metoprolol tartrate 50 milliGRAM(s) Oral two times a day  metroNIDAZOLE  IVPB 500 milliGRAM(s) IV Intermittent every 8 hours  mupirocin 2% Ointment 1 Application(s) Topical two times a day  predniSONE   Tablet 5 milliGRAM(s) Oral daily  simvastatin 20 milliGRAM(s) Oral at bedtime  vancomycin    Solution 125 milliGRAM(s) Oral every 6 hours    MEDICATIONS  (PRN):  acetaminophen   Tablet 650 milliGRAM(s) Oral every 6 hours PRN For Temp greater than 38 C (100.4 F)      Vital Signs Last 24 Hrs  T(C): 36.7 (10 May 2018 04:36), Max: 36.9 (09 May 2018 18:09)  T(F): 98 (10 May 2018 04:36), Max: 98.4 (09 May 2018 18:09)  HR: 51 (10 May 2018 04:36) (51 - 97)  BP: 148/63 (10 May 2018 04:36) (116/62 - 150/60)  BP(mean): --  RR: 16 (10 May 2018 04:36) (16 - 17)  SpO2: 95% (10 May 2018 04:36) (95% - 100%)    Physical Exam:        Constitutional: frail looking  HEENT: NC/AT, EOMI, PERRLA, conjunctivae clear; ears and nose atraumatic; pharynx clear  Neck: supple; thyroid not palpable  Respiratory: respiratory effort normal; clear to auscultation  Cardiovascular: S1S2 regular, tachycardic, 2/6 murmur left sternal border  Abdomen: soft, not tender, not distended, positive BS; no liver or spleen organomegaly  Genitourinary: no suprapubic tenderness  Musculoskeletal: no muscle tenderness, no joint swelling or tenderness; left upper extremity thrill at radial artery  Neurological/ Psychiatric: AxOx3, judgement and insight normal;  moving all extremities  Skin: multiple palpable lesions, hyperkeratotic lesions in various stages of skin breakdown with surrounding erythema; some draining white fluid on lower and upper extremities    Labs: all available labs reviewed             Labs:                  Labs:             Labs:                        9.4    5.97  )-----------( 30       ( 10 May 2018 05:42 )             28.8     05-10    141  |  113<H>  |  20  ----------------------------<  85  3.7   |  20<L>  |  1.15    Ca    8.7      10 May 2018 05:42  Phos  2.0     05-10  Mg     1.6     05-10             Cultures:       Culture - Blood (collected 05-08-18 @ 07:07)  Source: .Blood None  Preliminary Report (05-09-18 @ 11:01):    No growth to date.    Culture - Blood (collected 05-08-18 @ 07:02)  Source: .Blood None  Preliminary Report (05-09-18 @ 11:01):    No growth to date.    Culture - Urine (collected 05-04-18 @ 19:58)  Source: .Urine None  Final Report (05-05-18 @ 23:16):    <10,000 CFU/ml Normal Urogenital frank present    Culture - Blood (collected 05-04-18 @ 19:58)  Source: .Blood None  Gram Stain (05-05-18 @ 11:49):    Growth in anaerobic bottle: Gram Negative Rods  Final Report (05-07-18 @ 10:31):    Growth in anaerobic bottle: Klebsiella pneumoniae    "Due to technical problems, Proteus sp. will Not be reported as part of    the BCID panel until further notice"    ***Blood Panel PCR results on this specimen are available    approximately 3 hours afterthe Gram stain result.***    Gram stain, PCR, and/or culture results may not always    correspond due to difference in methodologies.    ************************************************************    This PCR assay was performed using [employer].    The following targets are tested for: Enterococcus,    vancomycin resistant enterococci, Listeria monocytogenes,    coagulase negative staphylococci, S. aureus,    methicillin resistant S. aureus, Streptococcus agalactiae    (Group B), S. pneumoniae, S. pyogenes (Group A),    Acinetobacter baumannii, Enterobacter cloacae, E. coli,    Klebsiella oxytoca, K. pneumoniae, Proteus sp.,    Serratia marcescens, Haemophilus influenzae,    Neisseria meningitidis, Pseudomonas aeruginosa, Candida    albicans, C. glabrata, C krusei, C parapsilosis,    C. tropicalis and the KPC resistance gene.  Organism: Blood Culture PCR  Klebsiella pneumoniae (05-07-18 @ 10:31)  Organism: Klebsiella pneumoniae (05-07-18 @ 10:31)      -  Amikacin: S <=8      -  Ampicillin: R >16 These ampicillin results predict results for amoxicillin      -  Ampicillin/Sulbactam: S 8/4      -  Aztreonam: S <=4      -  Cefazolin: S <=2      -  Cefepime: S <=2      -  Cefoxitin: I 16      -  Ceftriaxone: S <=1 Enterobacter, Citrobacter, and Serratia may develop resistance during prolonged therapy      -  Ciprofloxacin: S <=0.5      -  Ertapenem: S <=0.5      -  Gentamicin: S <=1      -  Imipenem: S <=1      -  Levofloxacin: S <=1      -  Meropenem: S <=1      -  Piperacillin/Tazobactam: S <=8      -  Tobramycin: S <=2      -  Trimethoprim/Sulfamethoxazole: S <=0.5/9.5      Method Type: TOMY  Organism: Blood Culture PCR (05-07-18 @ 10:31)      -  Klebsiella pneumoniae: Detec      Method Type: PCR    Culture - Blood (collected 05-04-18 @ 19:58)  Source: .Blood None  Final Report (05-10-18 @ 01:01):    No growth at 5 days.                  Radiology: all available radiological tests reviewed    Advanced directives addressed: full resuscitation

## 2018-05-10 NOTE — CHART NOTE - NSCHARTNOTEFT_GEN_A_CORE
Assessment: Pt eating well, no GI issues.  Some complaints about food coming up late, or salty, but in general pt is eating and comfortable.    Braulio on admit 17  Now 22    No new weight, suggest new weight.     Previous RECOMMENDATIONS: 1) c/w regular diet Rx and encourage high protein snack between meals 2) monitor PO intake/wt closely 3) add MVI with minerals daily to ensure 100% RDI met  Diet Presciption: Diet, Regular (05-05-18 @ 01:34)    Assessment:   Estimated Energy Needs (calories/kg):  · Weight Used for Calculation  admission  50Kg    Other Calculation:  · Other Calculation  Ht. 66 " (confirmed by pt)     Wt. 50.2 Kg      17.9 BMI       IBW 59 Kg      Pt is at 85 %  IBW    Estimated Protein Needs (1.4-1.6 g/kg):  · Weight  (lbs)  110.2  · Weight (kg)  50 kg  · From (1.4 g/kg)  70  · To (1.6 g/kg)  80    Estimated Fluid Needs (30-35 ml/kg):  · Weight  (lbs)  110.2  · Weight (kg)  50  · From (30 ml/kg)  1500  · To (35 ml/kg)  1750    Nutrition Diagnosis:   Nutrition Diagnostic #1:  · Nutrition Diagnostic Terminology #1: Nutrient  · Nutrient: Malnutrition  · Etiology: suboptimal intake with increased nutr needs 2/2 CA  · Signs/Symptoms: 9% wt loss x 4 mo; mild/moderate muscle loss, non-pitting edema  · Nutrition Intervention: Meals and Snack  · Meals and Snacks: Protein - modified diet; Other (specify); encourage high protein snacks between meals  · Goal/Expected Outcome: pt meet >80% of estimated nutr needs with no further wt loss    Nutrition Interventions:   Nutrition Interventions:  Nutrient intake needs to be increased, therefore will provide other; encourage high protein snacks between meals.      Wt Hx:  Height (cm): 157.48 (05-04-18 @ 18:42)  Weight (kg): 49.9 (05-04-18 @ 18:42)  BMI (kg/m2): 20.1 (05-04-18 @ 18:42)    No new weight.         Pertinent Medications: MEDICATIONS  (STANDING):  amoxicillin  500 milliGRAM(s)/clavulanate 1 Tablet(s) Oral two times a day  magnesium oxide 400 milliGRAM(s) Oral two times a day with meals  metoprolol tartrate 50 milliGRAM(s) Oral two times a day  mupirocin 2% Ointment 1 Application(s) Topical two times a day  potassium acid phosphate/sodium acid phosphate tablet (K-PHOS No. 2) 1 Tablet(s) Oral three times a day with meals  predniSONE   Tablet 5 milliGRAM(s) Oral daily  simvastatin 20 milliGRAM(s) Oral at bedtime  vancomycin    Solution 125 milliGRAM(s) Oral every 6 hours    MEDICATIONS  (PRN):  acetaminophen   Tablet 650 milliGRAM(s) Oral every 6 hours PRN For Temp greater than 38 C (100.4 F)    Pertinent Labs: 05-10 Na141 mmol/L Glu 85 mg/dL K+ 3.7 mmol/L Cr  1.15 mg/dL BUN 20 mg/dL 05-10 Phos 2.0 mg/dL<L> 05-04 Alb 3.0 g/dL<L>     CAPILLARY BLOOD GLUCOSE                Monitoring and Evaluation:   [x] PO intake/Nutr support infusion [ x ] Tolerance to Nutr [ x ] weights [ x ] labs[ x ] follow up per protocol  [ ] other:

## 2018-05-11 ENCOUNTER — TRANSCRIPTION ENCOUNTER (OUTPATIENT)
Age: 68
End: 2018-05-11

## 2018-05-11 VITALS
OXYGEN SATURATION: 99 % | SYSTOLIC BLOOD PRESSURE: 134 MMHG | HEART RATE: 89 BPM | TEMPERATURE: 98 F | RESPIRATION RATE: 18 BRPM | DIASTOLIC BLOOD PRESSURE: 57 MMHG

## 2018-05-11 DIAGNOSIS — R00.0 TACHYCARDIA, UNSPECIFIED: ICD-10-CM

## 2018-05-11 LAB
ALBUMIN SERPL ELPH-MCNC: 2.1 G/DL — LOW (ref 3.3–5)
ANION GAP SERPL CALC-SCNC: 8 MMOL/L — SIGNIFICANT CHANGE UP (ref 5–17)
BUN SERPL-MCNC: 18 MG/DL — SIGNIFICANT CHANGE UP (ref 7–23)
CALCIUM SERPL-MCNC: 8.5 MG/DL — SIGNIFICANT CHANGE UP (ref 8.5–10.1)
CHLORIDE SERPL-SCNC: 111 MMOL/L — HIGH (ref 96–108)
CO2 SERPL-SCNC: 22 MMOL/L — SIGNIFICANT CHANGE UP (ref 22–31)
CREAT ?TM UR-MCNC: 15 MG/DL — SIGNIFICANT CHANGE UP
CREAT SERPL-MCNC: 1.17 MG/DL — SIGNIFICANT CHANGE UP (ref 0.5–1.3)
GLUCOSE SERPL-MCNC: 82 MG/DL — SIGNIFICANT CHANGE UP (ref 70–99)
HCT VFR BLD CALC: 28.1 % — LOW (ref 34.5–45)
HGB BLD-MCNC: 9.2 G/DL — LOW (ref 11.5–15.5)
MAGNESIUM SERPL-MCNC: 1.6 MG/DL — SIGNIFICANT CHANGE UP (ref 1.6–2.6)
MCHC RBC-ENTMCNC: 31.1 PG — SIGNIFICANT CHANGE UP (ref 27–34)
MCHC RBC-ENTMCNC: 32.7 GM/DL — SIGNIFICANT CHANGE UP (ref 32–36)
MCV RBC AUTO: 94.9 FL — SIGNIFICANT CHANGE UP (ref 80–100)
NRBC # BLD: 0 /100 WBCS — SIGNIFICANT CHANGE UP (ref 0–0)
PHOSPHATE SERPL-MCNC: 2.4 MG/DL — LOW (ref 2.5–4.5)
PLATELET # BLD AUTO: 28 K/UL — LOW (ref 150–400)
POTASSIUM SERPL-MCNC: 3.6 MMOL/L — SIGNIFICANT CHANGE UP (ref 3.5–5.3)
POTASSIUM SERPL-SCNC: 3.6 MMOL/L — SIGNIFICANT CHANGE UP (ref 3.5–5.3)
PROT ?TM UR-MCNC: 55 MG/DL — HIGH (ref 0–12)
PROT/CREAT UR-RTO: 3.7 RATIO — HIGH (ref 0–0.2)
RBC # BLD: 2.96 M/UL — LOW (ref 3.8–5.2)
RBC # FLD: 15.9 % — HIGH (ref 10.3–14.5)
SODIUM SERPL-SCNC: 141 MMOL/L — SIGNIFICANT CHANGE UP (ref 135–145)
WBC # BLD: 9.51 K/UL — SIGNIFICANT CHANGE UP (ref 3.8–10.5)
WBC # FLD AUTO: 9.51 K/UL — SIGNIFICANT CHANGE UP (ref 3.8–10.5)

## 2018-05-11 PROCEDURE — 93971 EXTREMITY STUDY: CPT | Mod: 26,RT

## 2018-05-11 PROCEDURE — 99233 SBSQ HOSP IP/OBS HIGH 50: CPT

## 2018-05-11 RX ORDER — FUROSEMIDE 40 MG
1 TABLET ORAL
Qty: 0 | Refills: 0 | COMMUNITY

## 2018-05-11 RX ORDER — POTASSIUM CHLORIDE 20 MEQ
1 PACKET (EA) ORAL
Qty: 0 | Refills: 0 | COMMUNITY

## 2018-05-11 RX ORDER — MAGNESIUM OXIDE 400 MG ORAL TABLET 241.3 MG
1 TABLET ORAL
Qty: 14 | Refills: 0 | OUTPATIENT
Start: 2018-05-11 | End: 2018-05-24

## 2018-05-11 RX ORDER — SODIUM,POTASSIUM PHOSPHATES 278-250MG
1 POWDER IN PACKET (EA) ORAL
Qty: 14 | Refills: 0 | OUTPATIENT
Start: 2018-05-11 | End: 2018-05-24

## 2018-05-11 RX ORDER — METOPROLOL TARTRATE 50 MG
1 TABLET ORAL
Qty: 0 | Refills: 0 | COMMUNITY
Start: 2018-05-11

## 2018-05-11 RX ORDER — ACETAMINOPHEN 500 MG
2 TABLET ORAL
Qty: 0 | Refills: 0 | COMMUNITY
Start: 2018-05-11

## 2018-05-11 RX ORDER — SODIUM,POTASSIUM PHOSPHATES 278-250MG
1 POWDER IN PACKET (EA) ORAL
Qty: 0 | Refills: 0 | Status: DISCONTINUED | OUTPATIENT
Start: 2018-05-11 | End: 2018-05-11

## 2018-05-11 RX ORDER — METOPROLOL TARTRATE 50 MG
75 TABLET ORAL EVERY 12 HOURS
Qty: 0 | Refills: 0 | Status: DISCONTINUED | OUTPATIENT
Start: 2018-05-11 | End: 2018-05-11

## 2018-05-11 RX ORDER — VANCOMYCIN HCL 1 G
2 VIAL (EA) INTRAVENOUS
Qty: 52 | Refills: 0 | OUTPATIENT
Start: 2018-05-11 | End: 2018-05-23

## 2018-05-11 RX ORDER — AMLODIPINE BESYLATE 2.5 MG/1
1 TABLET ORAL
Qty: 0 | Refills: 0 | COMMUNITY

## 2018-05-11 RX ORDER — VANCOMYCIN HCL 1 G
1 VIAL (EA) INTRAVENOUS
Qty: 52 | Refills: 0 | OUTPATIENT
Start: 2018-05-11 | End: 2018-05-23

## 2018-05-11 RX ORDER — LOSARTAN POTASSIUM 100 MG/1
1 TABLET, FILM COATED ORAL
Qty: 0 | Refills: 0 | COMMUNITY

## 2018-05-11 RX ADMIN — MAGNESIUM OXIDE 400 MG ORAL TABLET 400 MILLIGRAM(S): 241.3 TABLET ORAL at 09:13

## 2018-05-11 RX ADMIN — Medication 125 MILLIGRAM(S): at 11:00

## 2018-05-11 RX ADMIN — Medication 125 MILLIGRAM(S): at 05:26

## 2018-05-11 RX ADMIN — Medication 1 TABLET(S): at 05:26

## 2018-05-11 RX ADMIN — Medication 50 MILLIGRAM(S): at 05:26

## 2018-05-11 RX ADMIN — Medication 1 TABLET(S): at 10:59

## 2018-05-11 RX ADMIN — Medication 5 MILLIGRAM(S): at 05:26

## 2018-05-11 RX ADMIN — MUPIROCIN 1 APPLICATION(S): 20 OINTMENT TOPICAL at 05:55

## 2018-05-11 NOTE — PROGRESS NOTE ADULT - PROBLEM SELECTOR PLAN 2
Holter shows atrial and ventricular ectopy likely from pericardial effusion.  Will increase lopressor to 75mg BID to try and control this and prevent any sustained arrhythmias.

## 2018-05-11 NOTE — DISCHARGE NOTE ADULT - CARE PROVIDER_API CALL
Esvin Aguilar), Internal Medicine  [address] [place] [address]  Phone: [phone]  Fax: [phone]    morgan franco  Phone: (   )    -  Fax: (   )    -    Claudette Franco  Phone: (   )    -  Fax: (   )    -    Coreen Bazan), Internal Medicine  [address] [place] [address]  Phone: [phone]  Fax: [phone]

## 2018-05-11 NOTE — DISCHARGE NOTE ADULT - CARE PROVIDERS DIRECT ADDRESSES
[email or web address]

## 2018-05-11 NOTE — DISCHARGE NOTE ADULT - ADDITIONAL INSTRUCTIONS
Do labs at Dr Aguilar's office in 3 days  and yun flynn appointment for follow up, F/u with Dr Franco in 1 week. F/u with Dr Bazan in 3-4 days, cardiology in 2 weeks

## 2018-05-11 NOTE — PROGRESS NOTE ADULT - PROVIDER SPECIALTY LIST ADULT
Cardiology
Cardiology
Hospitalist
Infectious Disease
Infectious Disease
Nephrology
Infectious Disease
Infectious Disease
Hospitalist
Hospitalist

## 2018-05-11 NOTE — PROGRESS NOTE ADULT - SUBJECTIVE AND OBJECTIVE BOX
Patient is a 67y Female who reports no complaints overnight.    less loose BM    no sob or cp     REVIEW OF SYSTEMS:    CONSTITUTIONAL: No weakness, fevers or chills  RESPIRATORY: No cough, wheezing, hemoptysis; No shortness of breath  CARDIOVASCULAR: No chest pain or palpitations  GENITOURINARY: No dysuria, frequency or hematuria  All other review of systems is negative unless indicated above.    MEDICATIONS  (STANDING):  amoxicillin  500 milliGRAM(s)/clavulanate 1 Tablet(s) Oral two times a day  magnesium oxide 400 milliGRAM(s) Oral two times a day with meals  metoprolol tartrate 50 milliGRAM(s) Oral two times a day  mupirocin 2% Ointment 1 Application(s) Topical two times a day  potassium acid phosphate/sodium acid phosphate tablet (K-PHOS No. 2) 1 Tablet(s) Oral three times a day with meals  predniSONE   Tablet 5 milliGRAM(s) Oral daily  simvastatin 20 milliGRAM(s) Oral at bedtime  vancomycin    Solution 125 milliGRAM(s) Oral every 6 hours    Vital Signs Last 24 Hrs  T(C): 36.7 (11 May 2018 04:37), Max: 36.8 (10 May 2018 21:03)  T(F): 98.1 (11 May 2018 04:37), Max: 98.2 (10 May 2018 21:03)  HR: 90 (11 May 2018 04:37) (74 - 94)  BP: 150/84 (11 May 2018 04:37) (133/57 - 150/84)  BP(mean): --  RR: 16 (11 May 2018 04:37) (16 - 17)  SpO2: 98% (11 May 2018 04:37) (92% - 98%)    Constitutional: NAD  HEENT: PERRLA, EOMI,  MMM  Neck: No LAD, No JVD  Respiratory: good aeration b/l  Cardiovascular: S1 and S2, RRR  Gastrointestinal: BS+, soft, NT/ND  Extremities:  peripheral edema +  Neurological: A/O x 3, no focal deficits  Psychiatric: Normal mood, normal affect  : No Manning  Skin: No rashes  Access: Not applicable    LABS:                                              9.2    9.51  )-----------( 28       ( 11 May 2018 05:52 )             28.1                         9.4    5.97  )-----------( 30       ( 10 May 2018 05:42 )             28.8     141    |  111    |  18     ----------------------------<  82        11 May 2018 05:52  3.6     |  22     |  1.17     141    |  113    |  20     ----------------------------<  85        10 May 2018 05:42  3.7     |  20     |  1.15     144    |  115    |  24     ----------------------------<  83        09 May 2018 05:34  3.4     |  19     |  1.22     Ca    8.5        11 May 2018 05:52  Ca    8.7        10 May 2018 05:42    Phos  2.4       11 May 2018 05:52  Phos  2.0       10 May 2018 05:42    Mg     1.6       11 May 2018 05:52  Mg     1.6       10 May 2018 05:42      Urine Studies:      RADIOLOGY & ADDITIONAL STUDIES:         EXAM:  2D ECHO FOLLOW UP AD         PROCEDURE DATE:  05/08/2018        INTERPRETATION:  Transthoracic Echocardiography Report (TTE)     Demographics     Patient name        JUICE CHU    Age           67 year(s)     Med Rec #           [id number]         Gender        Female     Account #           [id number]           Date of Birth 1950     Interpreting        Oh Clayton MD  Room Number   0003   Physician     Referring Physician DONNA LI MD  Sonographer   Jonathan Vargas,                                                    [facility]     Date of study       05/08/2018 01:23                       PM     Height              62.01 in          Weight        110.23 pounds    Type of Study:     TTE procedure: 2D Echo Follow UP AD     Study Location: 1NTechnical Quality: Limited study    Indications   1) I31.3 - Pericardial effusion noninflammatory    Doppler Measurements:      MV Peak E-Wave: 105 cm/s      MV Peak Gradient: 4.41 mmHg     Findings     Mitral Valve   Normal mitral valve as seen     Aortic Valve   Normal aortic valve as seen     Left Ventricle   Limited study to asses pericardial effusion.   Estimated left ventricular ejection fraction is 50-55 %.     Pericardial Effusion   A moderate-sized pericardial effusion is present.   No Significant changes since prior study.     Pleural Effusion   Pleural effusion cannot be ruled out.     Miscellaneous   IVC is collapsing with inspiration.    Comparative Statement  No Significant changes since prior study.     Impression     Summary     Limited study to asses pericardial effusion.   Estimated left ventricular ejection fraction is 50-55 %.   Normal aortic valve as seen   Normal mitral valve as seen   A moderate-sized pericardial effusion is present.   No Significant changes since prior study.     Signature     ----------------------------------------------------------------   Electronically signed by Oh Clayton MD(Interpreting   physician) on 05/08/2018 03:27 PM   ----------------------------------------------------------------    Valves     Mitral Valve     Peak E-Wave: 105 cm/s   Peak Gradient: 4.41 mmHg    Structures

## 2018-05-11 NOTE — PROGRESS NOTE ADULT - ASSESSMENT
68 y/o F with a PMHx of HTN, PCKD w  CKD 3  Cr ~ 1.5 - 1.6 w hx of renal transplant off all immunosupressants since March due to widespread Merkel cell carcinoma on PET Scan now s/p chemo 1.5 weeks ago), HLD, HTN, admitted for:     Fever /Sepsis/ Neutropenia - kelbseilla bactaremia   - IV Abx as per Dr Erazo : On Daptomycin and Cefepime   - po vanco - for hx of cdiff per ID    - CT noted - ? typhilitis    CKD 3 -    renal transplant off immunosuppression since March due to active metastatic cancer    continue prednisone therapy    monitor cr daily    no nsaid's   - no NSAID's    Tachyarrythmia /Effusions -   - Moderate  pericardial effusions/SOB   - advised to fup with cardiology closely    - need to resume on discharge if symptoms - none now     HTN    - continue Metoprolol 50 mg BID    - hold amlodipine due to LE edema - if BP rises may resume losartan at home - follow BP at home - pt adivsed   - if SOB or edema worsens may resume home dose lasix    - await LE doppler per Medicine      labs at office on Monday to forward to [facility] - pt advised to fup [facility] regarding platelets and potential modification of future chemotherapy

## 2018-05-11 NOTE — DISCHARGE NOTE ADULT - PLAN OF CARE
resolve complete oral augmentin and VAnco   In case of fevers cecilia abbott PCP or go to ER control Continue with increased dose of Metoprolol monitor repeat blood work in 3days at DR Ceja office hold Amlodipine and Losartan  F/u with Dr Aguilar for further BP control F/u with Dr Mendosa  for further Tx  Also discuss CT findings and if needed further GI evaluation

## 2018-05-11 NOTE — DISCHARGE NOTE ADULT - HOSPITAL COURSE
68 y/o F with a PMHx  of Merkel cell carcinoma (chemo 1.5 weeks ago), HLD,  MRSA skin infection,   HTN, s/p kidney transplant (17 years ago, off immunotherapy for last 6 weeks) admitted  for Neutropenic fever.  Pt was found to have Pancytopenia and  Bacteremia with Klebsiella pneumonia. CT abd showed typhlitis and  focus  small bowel  wall  thickening. Pt was on broad spectrum ABXs as per ID recommendations: cefepime, flagyl and PO VAnco for H/o C.diff in the past. Pt clinically improved. She required 1U of Platelets and hep Plts levels now stable at 27k. Neutropenia resolved. Pts labs showed dehydration and FREDDIE, which resolved with IVF. Pt was followed by Dr Aguilar  Pt also noted to have arrhythmia and was on monitor x 24h, found to have episodes of Sinus tach with PVCs and APCs, her BB increased and Pt is cleared for d/c with f/u at cardio office.  Also Pt noted that R leg more swollen and had RLE doppler which was neg. Today Pt feels well, reports no complains, meds, outpt follow up and d/c planning discussed  Vital Signs Last 24 Hrs  T(C): 36.4 (11 May 2018 14:00), Max: 36.8 (10 May 2018 21:03)  T(F): 97.6 (11 May 2018 14:00), Max: 98.2 (10 May 2018 21:03)  HR: 89 (11 May 2018 14:00) (81 - 94)  BP: 134/57 (11 May 2018 14:00) (133/57 - 150/84)  RR: 18 (11 May 2018 14:00) (16 - 18)  SpO2: 99% (11 May 2018 14:00) (93% - 99%)    PHYSICAL EXAM:  General: Well developed; malnourished; in no acute distress  Eyes: PERRLA, EOMI; conjunctiva and sclera clear  Head: Normocephalic; atraumatic  ENMT: No nasal discharge; airway clear  Neck: Supple; non tender; no masses  Respiratory: Good air entry, No  rales or rhonchi  Cardiovascular: Regular rate and rhythm. S1 and S2 Normal; No murmurs  Gastrointestinal: Soft, non-tender, not  distended; Normal bowel sounds  Genitourinary: No costovertebral angle tenderness  Extremities: Normal range of motion, No  edema. Multiple LE skin lesions, RLE with dressing, intact. LLE edema more medial thigh, non tender,  no erythema, improve now   Vascular: Peripheral pulses palpable 2+ bilaterally  Neurological: Alert and oriented x4, non focal   Skin: Warm and dry. No acute rash  Lymph Nodes: No acute cervical adenopathy  Musculoskeletal: Normal muscle tone, without deformities  Psychiatric: Cooperative and appropriate  PLAN:   1. Fever.  Sepsis. Neutropenia. Klebsiella bacteremia    - found to have typhlitis  on CT abd/pelvis   - May d/c  Neutropenic precautions, WBCs at normal levels   - CXR neg   - UA+,  UCX  neg   - BCX: + Klebsiela pneumonia in   anaerobic CX   - Repeat CX< NGTD   - C/w IV Abxs as per ID : On Dapto and Cefepime, Flagyl. Switched to PO Augmentin for 8 more days  - On PO VAnco for C.Diff PPxs as pt has past history, to complete total 13 days   - Stool Cdiff negative  - D/w DR Erazo    2.  FREDDIE, CKD stage 3  h/o Renal transplant  Renal Fx is stable   Continue prednisone  Avoid nephrotoxic agents   will follow with DR Aguilar   3. HTN  Monitor BP, stable   off amlodipine and losartan  C/w metoprolol   5. Merkel cell carcinoma., recently Dz with mediastinal LAD and liver mets. Pancytopenia   On Chemo: Carboplatin and Etoposide   Pancytopenia likely due to chemo, improved, WBCs at normal level and PTLs trending up   Had Neulasta about 14 days ago   S/p Transfusion  Plts 1U    Off  ASA   CT abd/pelvis results noted for 2.8 cm circumferential small bowel wall thickening, as per radiology possible  primary neoplasm Pt will need to f/u with GI and her  oncologist for further evaluation when stable   Follow with Dr Aguilar in 3 days for labs   F/u with DR Mendosa in 1 week    6. Moderate pericardial effusion  asymptomatic   as per Pt had it since 2011 and followed by Cardio  at [facility]  ECHO was repeated and looks stable  Pt hemodynamically stable, asymptomatic   Off lasix, will not continue at d/c   D/w DR Patterson,  stable for d/c     7.  Episodes of tachycardia and irregular HR  EKG: SR with APCs    c/w BB increased dose  F/u with CArdio in 1-2 weeks     8. Hypophosphatemia/Hypokalemia/Hypomagnesemia   c/w low dose magnesium and potassium/phosph   recheck in am     9.  R thigh and  leg edema  - Doppler neg for  DVT   - Suspect  due to IVF and poor outflow due to H/o CA   - Elevate extremity     Dispo; Stable for d/c home with HC today  D/w DR Aguilar and message left for Dr Mendosa

## 2018-05-11 NOTE — DISCHARGE NOTE ADULT - PATIENT PORTAL LINK FT
You can access the [facility], offered by [facility], by registering with the following website: [email or web address]

## 2018-05-11 NOTE — DISCHARGE NOTE ADULT - MEDICATION SUMMARY - MEDICATIONS TO STOP TAKING
I will STOP taking the medications listed below when I get home from the hospital:    amLODIPine 5 mg oral tablet  -- 1 tab(s) by mouth once a day    losartan 100 mg oral tablet  -- 1 tab(s) by mouth once a day    Lasix 40 mg oral tablet  -- 1 tab(s) by mouth once a day    Klor-Con 10 oral tablet, extended release  -- 1 tab(s) by mouth once a day

## 2018-05-11 NOTE — DISCHARGE NOTE ADULT - CARE PLAN
Principal Discharge DX:	Neutropenic fever  Goal:	resolve  Assessment and plan of treatment:	complete oral augmentin and VAnco   In case of fevers cecilia abbott PCP or go to ER  Secondary Diagnosis:	Tachyarrhythmia  Goal:	control  Assessment and plan of treatment:	Continue with increased dose of Metoprolol  Secondary Diagnosis:	Pancytopenia  Goal:	monitor  Assessment and plan of treatment:	repeat blood work in 3days at DR Ceja office  Secondary Diagnosis:	Hypertension  Goal:	control  Assessment and plan of treatment:	hold Amlodipine and Losartan  F/u with Dr Aguilar for further BP control  Secondary Diagnosis:	Merkel cell carcinoma  Goal:	control  Assessment and plan of treatment:	F/u with Dr Mendosa  for further Tx  Also discuss CT findings and if needed further GI evaluation

## 2018-05-11 NOTE — DISCHARGE NOTE ADULT - PROVIDER TOKENS
TOKEN:'[id number]',FREE:[LAST:[d'seymour],FIRST:[morgan],PHONE:[(   )    -],FAX:[(   )    -]],FREE:[LAST:[D'Seymour],FIRST:[Claudette],PHONE:[(   )    -],FAX:[(   )    -]],TOKEN:'[id number]'

## 2018-05-11 NOTE — DISCHARGE NOTE ADULT - MEDICATION SUMMARY - MEDICATIONS TO TAKE
I will START or STAY ON the medications listed below when I get home from the hospital:    predniSONE 5 mg oral tablet  -- 1 tab(s) by mouth once a day  -- Indication: For kidney tranplant     acetaminophen 325 mg oral tablet  -- 2 tab(s) by mouth every 6 hours, As needed, For pain  -- Indication: For Pain     aspirin 81 mg oral tablet  -- 1 tab(s) by mouth once a day  -- Indication: For Ppcx     simvastatin 20 mg oral tablet  -- 1 tab(s) by mouth once a day (at bedtime)  -- Indication: For HLD    metoprolol tartrate 75 mg oral tablet  -- 1 tab(s) by mouth every 12 hours  -- Indication: For Arrhythmia     vancomycin 125 mg oral capsule  -- 1 cap(s) by mouth every 6 hours   -- Finish all this medication unless otherwise directed by prescriber.    -- Indication: For C.Diff Prophylaxis     magnesium oxide 400 mg (241.3 mg elemental magnesium) oral tablet  -- 1 tab(s) by mouth once a day   -- Indication: For Supplement    potassium phosphate-sodium phosphate 305 mg-700 mg oral tablet  -- 1 tab(s) by mouth once a day   -- Indication: For Supplement     amoxicillin-clavulanate 500 mg-125 mg oral tablet  -- 1 tab(s) by mouth 2 times a day  -- Indication: For Pneutropenic fever

## 2018-05-11 NOTE — PROGRESS NOTE ADULT - SUBJECTIVE AND OBJECTIVE BOX
REASON FOR VISIT: Pericardial effusion    HPI:  68 y/o woman with Merkel cell carcinoma (chemo 1.5 weeks ago), HLD, HTN, polycystic kidney s/p kidney transplant, and chronic pericardial effusion admitted 5/4/18 with  pancytopenia and Klebsiella sepsis; cardiology consult requested due to pericardial effusion and intermittent tachycardia.    5/10/18:  Comfortable; no SOB or orthopnea.  5/11/18: reviewed results of holter which shows frequent apc's, atrial bigemny, 1 run of 10 beats NSVT. Mean HR 78.    She feels well without any new complaints.    MEDICATIONS  (STANDING):  amoxicillin  500 milliGRAM(s)/clavulanate 1 Tablet(s) Oral two times a day  magnesium oxide 400 milliGRAM(s) Oral two times a day with meals  metoprolol tartrate 75 milliGRAM(s) Oral every 12 hours  mupirocin 2% Ointment 1 Application(s) Topical two times a day  potassium acid phosphate/sodium acid phosphate tablet (K-PHOS No. 2) 1 Tablet(s) Oral three times a day with meals  predniSONE   Tablet 5 milliGRAM(s) Oral daily  simvastatin 20 milliGRAM(s) Oral at bedtime  vancomycin    Solution 125 milliGRAM(s) Oral every 6 hours    MEDICATIONS  (PRN):  acetaminophen   Tablet 650 milliGRAM(s) Oral every 6 hours PRN For Temp greater than 38 C (100.4 F)      Vital Signs Last 24 Hrs  T(C): 36.4 (11 May 2018 14:00), Max: 36.8 (10 May 2018 21:03)  T(F): 97.6 (11 May 2018 14:00), Max: 98.2 (10 May 2018 21:03)  HR: 89 (11 May 2018 14:00) (81 - 94)  BP: 134/57 (11 May 2018 14:00) (133/57 - 150/84)  BP(mean): --  RR: 18 (11 May 2018 14:00) (16 - 18)  SpO2: 99% (11 May 2018 14:00) (93% - 99%)    I&O's Detail    10 May 2018 07:01  -  11 May 2018 07:00  --------------------------------------------------------  IN:    Oral Fluid: 240 mL  Total IN: 240 mL    OUT:  Total OUT: 0 mL    Total NET: 240 mL      11 May 2018 07:01  -  11 May 2018 14:58  --------------------------------------------------------  IN:    Oral Fluid: 240 mL  Total IN: 240 mL    OUT:  Total OUT: 0 mL    Total NET: 240 mL          Daily     Daily   PHYSICAL EXAM:  Constitutional: Semirecumbent in bed, no distress  Respiratory: Nonlabored, breath sounds are clear  Cardiovascular: Regular, normal S1 and S2.   Extremities: No peripheral edema. No clubbing or cyanosis.    Psych:  Appropriate mood and affect    LABS:                                  9.2    9.51  )-----------( 28       ( 11 May 2018 05:52 )             28.1     05-11    141  |  111<H>  |  18  ----------------------------<  82  3.6   |  22  |  1.17    Ca    8.5      11 May 2018 05:52  Phos  2.4     05-11  Mg     1.6     05-11    TPro  x   /  Alb  2.1<L>  /  TBili  x   /  DBili  x   /  AST  x   /  ALT  x   /  AlkPhos  x   05-11        LIVER FUNCTIONS - ( 11 May 2018 05:52 )  Alb: 2.1 g/dL / Pro: x     / ALK PHOS: x     / ALT: x     / AST: x     / GGT: x               12 Lead ECG (05.06.18 @ 20:16):  Normal sinus rhythm  frequent APCs.  Low voltage QRS.  Cannot rule out Anterior infarct    Transthoracic Echocardiogram (05.07.18 @ 14:29) >  The left ventricle is normal in size, wall thickness, wall motion and contractility.  Estimated left ventricular ejection fraction is 50-55 %.  The left atrium is mildly dilated.  Normal appearing right ventricle structure and function.  Mild aortic regurgitation.  Mild mitral regurgitation.  Mild tricuspid valve regurgitation.  Mild to moderate pulmonary hypertension.  A moderate-sized pericardial effusion is present.

## 2018-05-15 DIAGNOSIS — I12.9 HYPERTENSIVE CHRONIC KIDNEY DISEASE WITH STAGE 1 THROUGH STAGE 4 CHRONIC KIDNEY DISEASE, OR UNSPECIFIED CHRONIC KIDNEY DISEASE: ICD-10-CM

## 2018-05-15 DIAGNOSIS — C78.7 SECONDARY MALIGNANT NEOPLASM OF LIVER AND INTRAHEPATIC BILE DUCT: ICD-10-CM

## 2018-05-15 DIAGNOSIS — E44.0 MODERATE PROTEIN-CALORIE MALNUTRITION: ICD-10-CM

## 2018-05-15 DIAGNOSIS — C4A.9 MERKEL CELL CARCINOMA, UNSPECIFIED: ICD-10-CM

## 2018-05-15 DIAGNOSIS — E87.6 HYPOKALEMIA: ICD-10-CM

## 2018-05-15 DIAGNOSIS — D61.810 ANTINEOPLASTIC CHEMOTHERAPY INDUCED PANCYTOPENIA: ICD-10-CM

## 2018-05-15 DIAGNOSIS — E83.39 OTHER DISORDERS OF PHOSPHORUS METABOLISM: ICD-10-CM

## 2018-05-15 DIAGNOSIS — N18.3 CHRONIC KIDNEY DISEASE, STAGE 3 (MODERATE): ICD-10-CM

## 2018-05-15 DIAGNOSIS — R00.0 TACHYCARDIA, UNSPECIFIED: ICD-10-CM

## 2018-05-15 DIAGNOSIS — I34.1 NONRHEUMATIC MITRAL (VALVE) PROLAPSE: ICD-10-CM

## 2018-05-15 DIAGNOSIS — Z94.0 KIDNEY TRANSPLANT STATUS: ICD-10-CM

## 2018-05-15 DIAGNOSIS — N17.9 ACUTE KIDNEY FAILURE, UNSPECIFIED: ICD-10-CM

## 2018-05-15 DIAGNOSIS — A41.59 OTHER GRAM-NEGATIVE SEPSIS: ICD-10-CM

## 2018-05-15 DIAGNOSIS — E86.0 DEHYDRATION: ICD-10-CM

## 2018-05-15 DIAGNOSIS — I31.3 PERICARDIAL EFFUSION (NONINFLAMMATORY): ICD-10-CM

## 2018-05-15 DIAGNOSIS — B96.1 KLEBSIELLA PNEUMONIAE [K. PNEUMONIAE] AS THE CAUSE OF DISEASES CLASSIFIED ELSEWHERE: ICD-10-CM

## 2018-05-15 DIAGNOSIS — C78.1 SECONDARY MALIGNANT NEOPLASM OF MEDIASTINUM: ICD-10-CM

## 2018-05-15 DIAGNOSIS — R53.1 WEAKNESS: ICD-10-CM

## 2018-05-15 DIAGNOSIS — Z79.82 LONG TERM (CURRENT) USE OF ASPIRIN: ICD-10-CM

## 2018-05-15 DIAGNOSIS — E78.5 HYPERLIPIDEMIA, UNSPECIFIED: ICD-10-CM

## 2018-05-15 DIAGNOSIS — Z88.8 ALLERGY STATUS TO OTHER DRUGS, MEDICAMENTS AND BIOLOGICAL SUBSTANCES: ICD-10-CM

## 2018-05-15 DIAGNOSIS — Z79.899 OTHER LONG TERM (CURRENT) DRUG THERAPY: ICD-10-CM

## 2018-06-20 ENCOUNTER — INPATIENT (INPATIENT)
Facility: HOSPITAL | Age: 68
LOS: 4 days | Discharge: HOSPICE | End: 2018-06-25
Attending: INTERNAL MEDICINE | Admitting: INTERNAL MEDICINE
Payer: MEDICARE

## 2018-06-20 VITALS
DIASTOLIC BLOOD PRESSURE: 100 MMHG | HEIGHT: 66 IN | WEIGHT: 119.93 LBS | TEMPERATURE: 98 F | SYSTOLIC BLOOD PRESSURE: 166 MMHG | HEART RATE: 125 BPM | OXYGEN SATURATION: 100 % | RESPIRATION RATE: 20 BRPM

## 2018-06-20 DIAGNOSIS — Z98.89 OTHER SPECIFIED POSTPROCEDURAL STATES: Chronic | ICD-10-CM

## 2018-06-20 DIAGNOSIS — D25.9 LEIOMYOMA OF UTERUS, UNSPECIFIED: Chronic | ICD-10-CM

## 2018-06-20 DIAGNOSIS — Z85.828 PERSONAL HISTORY OF OTHER MALIGNANT NEOPLASM OF SKIN: Chronic | ICD-10-CM

## 2018-06-20 DIAGNOSIS — Z94.0 KIDNEY TRANSPLANT STATUS: Chronic | ICD-10-CM

## 2018-06-20 LAB
BASOPHILS # BLD AUTO: 0.02 K/UL — SIGNIFICANT CHANGE UP (ref 0–0.2)
BASOPHILS NFR BLD AUTO: 0.6 % — SIGNIFICANT CHANGE UP (ref 0–2)
EOSINOPHIL # BLD AUTO: 0.01 K/UL — SIGNIFICANT CHANGE UP (ref 0–0.5)
EOSINOPHIL NFR BLD AUTO: 0.3 % — SIGNIFICANT CHANGE UP (ref 0–6)
HCT VFR BLD CALC: 31.8 % — LOW (ref 34.5–45)
HGB BLD-MCNC: 10.2 G/DL — LOW (ref 11.5–15.5)
IMM GRANULOCYTES NFR BLD AUTO: 16.9 % — HIGH (ref 0–1.5)
LYMPHOCYTES # BLD AUTO: 0.62 K/UL — LOW (ref 1–3.3)
LYMPHOCYTES # BLD AUTO: 19.7 % — SIGNIFICANT CHANGE UP (ref 13–44)
MCHC RBC-ENTMCNC: 32.1 GM/DL — SIGNIFICANT CHANGE UP (ref 32–36)
MCHC RBC-ENTMCNC: 32.5 PG — SIGNIFICANT CHANGE UP (ref 27–34)
MCV RBC AUTO: 101.3 FL — HIGH (ref 80–100)
MONOCYTES # BLD AUTO: 0.05 K/UL — SIGNIFICANT CHANGE UP (ref 0–0.9)
MONOCYTES NFR BLD AUTO: 1.6 % — LOW (ref 2–14)
NEUTROPHILS # BLD AUTO: 1.91 K/UL — SIGNIFICANT CHANGE UP (ref 1.8–7.4)
NEUTROPHILS NFR BLD AUTO: 60.9 % — SIGNIFICANT CHANGE UP (ref 43–77)
NRBC # BLD: 0 /100 WBCS — SIGNIFICANT CHANGE UP (ref 0–0)
PLATELET # BLD AUTO: 45 K/UL — LOW (ref 150–400)
RBC # BLD: 3.14 M/UL — LOW (ref 3.8–5.2)
RBC # FLD: 19.9 % — HIGH (ref 10.3–14.5)
WBC # BLD: 3.14 K/UL — LOW (ref 3.8–10.5)
WBC # FLD AUTO: 3.14 K/UL — LOW (ref 3.8–10.5)

## 2018-06-20 PROCEDURE — 71045 X-RAY EXAM CHEST 1 VIEW: CPT | Mod: 26

## 2018-06-20 PROCEDURE — 93010 ELECTROCARDIOGRAM REPORT: CPT

## 2018-06-20 RX ORDER — ONDANSETRON 8 MG/1
4 TABLET, FILM COATED ORAL ONCE
Qty: 0 | Refills: 0 | Status: COMPLETED | OUTPATIENT
Start: 2018-06-20 | End: 2018-06-20

## 2018-06-20 RX ORDER — MORPHINE SULFATE 50 MG/1
4 CAPSULE, EXTENDED RELEASE ORAL ONCE
Qty: 0 | Refills: 0 | Status: DISCONTINUED | OUTPATIENT
Start: 2018-06-20 | End: 2018-06-20

## 2018-06-20 RX ORDER — SODIUM CHLORIDE 9 MG/ML
2000 INJECTION INTRAMUSCULAR; INTRAVENOUS; SUBCUTANEOUS ONCE
Qty: 0 | Refills: 0 | Status: COMPLETED | OUTPATIENT
Start: 2018-06-20 | End: 2018-06-20

## 2018-06-20 RX ADMIN — SODIUM CHLORIDE 2000 MILLILITER(S): 9 INJECTION INTRAMUSCULAR; INTRAVENOUS; SUBCUTANEOUS at 23:35

## 2018-06-20 RX ADMIN — ONDANSETRON 4 MILLIGRAM(S): 8 TABLET, FILM COATED ORAL at 23:27

## 2018-06-20 RX ADMIN — MORPHINE SULFATE 4 MILLIGRAM(S): 50 CAPSULE, EXTENDED RELEASE ORAL at 23:27

## 2018-06-20 NOTE — ED ADULT TRIAGE NOTE - CHIEF COMPLAINT QUOTE
patient presents in ED with abdominal pain. history of gastric CA. recently started on a new pain management regimen. denies constipation.

## 2018-06-20 NOTE — ED ADULT NURSE NOTE - OBJECTIVE STATEMENT
Pt presents to ED with worsening abdominal pain.  Denies chest pain. SOB on exertion.  Denies fever/chills/sick contacts. Hx of gastric ca on chemotherapy.

## 2018-06-20 NOTE — ED PROVIDER NOTE - CONSTITUTIONAL, MLM
normal... Chronically ill appearing female, pale, awake, alert, oriented to person, place, time/situation; appears uncomfortable

## 2018-06-20 NOTE — ED PROVIDER NOTE - OBJECTIVE STATEMENT
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  Pt reports diffuse metastasis throughout the chest and abdomen, yet does not suffer chronic abdominal pain.  Pt c/o abdominal pain, diffuse throughout the entire abdomen since about 6pm after eating.  +nausea with vomiting x 4.  No diarrhea.  +chronic constipation.  Has a Rx for oxycodone, which she has never taken, until tonight when she took a dose which did not provide relief.  Past surgical hx significant for hernia repair.

## 2018-06-21 LAB
ALBUMIN SERPL ELPH-MCNC: 2.7 G/DL — LOW (ref 3.3–5)
ALP SERPL-CCNC: 121 U/L — HIGH (ref 40–120)
ALT FLD-CCNC: 12 U/L — SIGNIFICANT CHANGE UP (ref 12–78)
ANION GAP SERPL CALC-SCNC: 8 MMOL/L — SIGNIFICANT CHANGE UP (ref 5–17)
AST SERPL-CCNC: 19 U/L — SIGNIFICANT CHANGE UP (ref 15–37)
BILIRUB SERPL-MCNC: 0.7 MG/DL — SIGNIFICANT CHANGE UP (ref 0.2–1.2)
BUN SERPL-MCNC: 37 MG/DL — HIGH (ref 7–23)
CALCIUM SERPL-MCNC: 8.6 MG/DL — SIGNIFICANT CHANGE UP (ref 8.5–10.1)
CHLORIDE SERPL-SCNC: 105 MMOL/L — SIGNIFICANT CHANGE UP (ref 96–108)
CO2 SERPL-SCNC: 26 MMOL/L — SIGNIFICANT CHANGE UP (ref 22–31)
CREAT SERPL-MCNC: 1.27 MG/DL — SIGNIFICANT CHANGE UP (ref 0.5–1.3)
GLUCOSE SERPL-MCNC: 139 MG/DL — HIGH (ref 70–99)
LACTATE SERPL-SCNC: 2 MMOL/L — SIGNIFICANT CHANGE UP (ref 0.7–2)
LACTATE SERPL-SCNC: 2.3 MMOL/L — HIGH (ref 0.7–2)
LACTATE SERPL-SCNC: 2.6 MMOL/L — HIGH (ref 0.7–2)
LACTATE SERPL-SCNC: 3.3 MMOL/L — HIGH (ref 0.7–2)
LIDOCAIN IGE QN: 184 U/L — SIGNIFICANT CHANGE UP (ref 73–393)
MAGNESIUM SERPL-MCNC: 1.7 MG/DL — SIGNIFICANT CHANGE UP (ref 1.6–2.6)
PHOSPHATE SERPL-MCNC: 2.5 MG/DL — SIGNIFICANT CHANGE UP (ref 2.5–4.5)
POTASSIUM SERPL-MCNC: 4.2 MMOL/L — SIGNIFICANT CHANGE UP (ref 3.5–5.3)
POTASSIUM SERPL-SCNC: 4.2 MMOL/L — SIGNIFICANT CHANGE UP (ref 3.5–5.3)
PROT SERPL-MCNC: 5.4 GM/DL — LOW (ref 6–8.3)
SODIUM SERPL-SCNC: 139 MMOL/L — SIGNIFICANT CHANGE UP (ref 135–145)

## 2018-06-21 PROCEDURE — 74019 RADEX ABDOMEN 2 VIEWS: CPT | Mod: 26

## 2018-06-21 PROCEDURE — 74176 CT ABD & PELVIS W/O CONTRAST: CPT | Mod: 26

## 2018-06-21 PROCEDURE — 76700 US EXAM ABDOM COMPLETE: CPT | Mod: 26

## 2018-06-21 PROCEDURE — 99285 EMERGENCY DEPT VISIT HI MDM: CPT

## 2018-06-21 PROCEDURE — 93010 ELECTROCARDIOGRAM REPORT: CPT

## 2018-06-21 RX ORDER — SODIUM CHLORIDE 9 MG/ML
2000 INJECTION INTRAMUSCULAR; INTRAVENOUS; SUBCUTANEOUS ONCE
Qty: 0 | Refills: 0 | Status: COMPLETED | OUTPATIENT
Start: 2018-06-21 | End: 2018-06-21

## 2018-06-21 RX ORDER — HEPARIN SODIUM 5000 [USP'U]/ML
5000 INJECTION INTRAVENOUS; SUBCUTANEOUS EVERY 12 HOURS
Qty: 0 | Refills: 0 | Status: DISCONTINUED | OUTPATIENT
Start: 2018-06-21 | End: 2018-06-21

## 2018-06-21 RX ORDER — SODIUM CHLORIDE 9 MG/ML
500 INJECTION INTRAMUSCULAR; INTRAVENOUS; SUBCUTANEOUS
Qty: 0 | Refills: 0 | Status: DISCONTINUED | OUTPATIENT
Start: 2018-06-21 | End: 2018-06-24

## 2018-06-21 RX ORDER — DOCUSATE SODIUM 100 MG
100 CAPSULE ORAL THREE TIMES A DAY
Qty: 0 | Refills: 0 | Status: DISCONTINUED | OUTPATIENT
Start: 2018-06-21 | End: 2018-06-24

## 2018-06-21 RX ORDER — SODIUM CHLORIDE 9 MG/ML
1000 INJECTION INTRAMUSCULAR; INTRAVENOUS; SUBCUTANEOUS
Qty: 0 | Refills: 0 | Status: DISCONTINUED | OUTPATIENT
Start: 2018-06-21 | End: 2018-06-21

## 2018-06-21 RX ORDER — VANCOMYCIN HCL 1 G
1000 VIAL (EA) INTRAVENOUS ONCE
Qty: 0 | Refills: 0 | Status: DISCONTINUED | OUTPATIENT
Start: 2018-06-21 | End: 2018-06-21

## 2018-06-21 RX ORDER — DAPTOMYCIN 500 MG/10ML
220 INJECTION, POWDER, LYOPHILIZED, FOR SOLUTION INTRAVENOUS EVERY 24 HOURS
Qty: 0 | Refills: 0 | Status: DISCONTINUED | OUTPATIENT
Start: 2018-06-21 | End: 2018-06-21

## 2018-06-21 RX ORDER — ACETAMINOPHEN 500 MG
650 TABLET ORAL EVERY 6 HOURS
Qty: 0 | Refills: 0 | Status: DISCONTINUED | OUTPATIENT
Start: 2018-06-21 | End: 2018-06-25

## 2018-06-21 RX ORDER — ASPIRIN/CALCIUM CARB/MAGNESIUM 324 MG
81 TABLET ORAL DAILY
Qty: 0 | Refills: 0 | Status: DISCONTINUED | OUTPATIENT
Start: 2018-06-21 | End: 2018-06-24

## 2018-06-21 RX ORDER — ACETAMINOPHEN 500 MG
1000 TABLET ORAL ONCE
Qty: 0 | Refills: 0 | Status: COMPLETED | OUTPATIENT
Start: 2018-06-21 | End: 2018-06-21

## 2018-06-21 RX ORDER — MORPHINE SULFATE 50 MG/1
4 CAPSULE, EXTENDED RELEASE ORAL
Qty: 0 | Refills: 0 | Status: DISCONTINUED | OUTPATIENT
Start: 2018-06-21 | End: 2018-06-23

## 2018-06-21 RX ORDER — CEFAZOLIN SODIUM 1 G
2000 VIAL (EA) INJECTION ONCE
Qty: 0 | Refills: 0 | Status: COMPLETED | OUTPATIENT
Start: 2018-06-21 | End: 2018-06-21

## 2018-06-21 RX ORDER — METOPROLOL TARTRATE 50 MG
25 TABLET ORAL DAILY
Qty: 0 | Refills: 0 | Status: DISCONTINUED | OUTPATIENT
Start: 2018-06-21 | End: 2018-06-21

## 2018-06-21 RX ORDER — METOPROLOL TARTRATE 50 MG
25 TABLET ORAL DAILY
Qty: 0 | Refills: 0 | Status: DISCONTINUED | OUTPATIENT
Start: 2018-06-22 | End: 2018-06-22

## 2018-06-21 RX ORDER — METOPROLOL TARTRATE 50 MG
25 TABLET ORAL ONCE
Qty: 0 | Refills: 0 | Status: COMPLETED | OUTPATIENT
Start: 2018-06-21 | End: 2018-06-21

## 2018-06-21 RX ORDER — MORPHINE SULFATE 50 MG/1
4 CAPSULE, EXTENDED RELEASE ORAL ONCE
Qty: 0 | Refills: 0 | Status: DISCONTINUED | OUTPATIENT
Start: 2018-06-21 | End: 2018-06-21

## 2018-06-21 RX ORDER — FUROSEMIDE 40 MG
40 TABLET ORAL ONCE
Qty: 0 | Refills: 0 | Status: COMPLETED | OUTPATIENT
Start: 2018-06-21 | End: 2018-06-21

## 2018-06-21 RX ORDER — MAGNESIUM OXIDE 400 MG ORAL TABLET 241.3 MG
400 TABLET ORAL DAILY
Qty: 0 | Refills: 0 | Status: DISCONTINUED | OUTPATIENT
Start: 2018-06-21 | End: 2018-06-24

## 2018-06-21 RX ORDER — PANTOPRAZOLE SODIUM 20 MG/1
40 TABLET, DELAYED RELEASE ORAL DAILY
Qty: 0 | Refills: 0 | Status: DISCONTINUED | OUTPATIENT
Start: 2018-06-21 | End: 2018-06-25

## 2018-06-21 RX ORDER — ONDANSETRON 8 MG/1
4 TABLET, FILM COATED ORAL EVERY 6 HOURS
Qty: 0 | Refills: 0 | Status: DISCONTINUED | OUTPATIENT
Start: 2018-06-21 | End: 2018-06-25

## 2018-06-21 RX ORDER — SODIUM,POTASSIUM PHOSPHATES 278-250MG
1 POWDER IN PACKET (EA) ORAL
Qty: 0 | Refills: 0 | Status: DISCONTINUED | OUTPATIENT
Start: 2018-06-21 | End: 2018-06-21

## 2018-06-21 RX ADMIN — Medication 400 MILLIGRAM(S): at 01:03

## 2018-06-21 RX ADMIN — MORPHINE SULFATE 4 MILLIGRAM(S): 50 CAPSULE, EXTENDED RELEASE ORAL at 01:13

## 2018-06-21 RX ADMIN — MORPHINE SULFATE 4 MILLIGRAM(S): 50 CAPSULE, EXTENDED RELEASE ORAL at 09:38

## 2018-06-21 RX ADMIN — Medication 25 MILLIGRAM(S): at 12:04

## 2018-06-21 RX ADMIN — Medication 40 MILLIGRAM(S): at 18:22

## 2018-06-21 RX ADMIN — Medication 5 MILLIGRAM(S): at 12:04

## 2018-06-21 RX ADMIN — SODIUM CHLORIDE 2000 MILLILITER(S): 9 INJECTION INTRAMUSCULAR; INTRAVENOUS; SUBCUTANEOUS at 13:18

## 2018-06-21 RX ADMIN — SODIUM CHLORIDE 100 MILLILITER(S): 9 INJECTION INTRAMUSCULAR; INTRAVENOUS; SUBCUTANEOUS at 14:16

## 2018-06-21 RX ADMIN — Medication 100 MILLIGRAM(S): at 14:12

## 2018-06-21 RX ADMIN — MAGNESIUM OXIDE 400 MG ORAL TABLET 400 MILLIGRAM(S): 241.3 TABLET ORAL at 12:04

## 2018-06-21 RX ADMIN — Medication 100 MILLIGRAM(S): at 21:41

## 2018-06-21 RX ADMIN — PANTOPRAZOLE SODIUM 40 MILLIGRAM(S): 20 TABLET, DELAYED RELEASE ORAL at 12:03

## 2018-06-21 RX ADMIN — SODIUM CHLORIDE 2000 MILLILITER(S): 9 INJECTION INTRAMUSCULAR; INTRAVENOUS; SUBCUTANEOUS at 12:08

## 2018-06-21 RX ADMIN — SODIUM CHLORIDE 2000 MILLILITER(S): 9 INJECTION INTRAMUSCULAR; INTRAVENOUS; SUBCUTANEOUS at 01:13

## 2018-06-21 RX ADMIN — DAPTOMYCIN 108.8 MILLIGRAM(S): 500 INJECTION, POWDER, LYOPHILIZED, FOR SOLUTION INTRAVENOUS at 01:36

## 2018-06-21 RX ADMIN — SODIUM CHLORIDE 2000 MILLILITER(S): 9 INJECTION INTRAMUSCULAR; INTRAVENOUS; SUBCUTANEOUS at 13:17

## 2018-06-21 RX ADMIN — MORPHINE SULFATE 4 MILLIGRAM(S): 50 CAPSULE, EXTENDED RELEASE ORAL at 12:04

## 2018-06-21 RX ADMIN — Medication 25 MILLIGRAM(S): at 14:11

## 2018-06-21 RX ADMIN — Medication 100 MILLIGRAM(S): at 01:13

## 2018-06-21 NOTE — CHART NOTE - NSCHARTNOTEFT_GEN_A_CORE
Upon Nutritional Assessment by the Registered Dietitian your patient was determined to meet criteria / has evidence of the following diagnosis/diagnoses:          [x]  Mild Protein Calorie Malnutrition        [ ]  Moderate Protein Calorie Malnutrition        [ ] Severe Protein Calorie Malnutrition        [ ] Unspecified Protein Calorie Malnutrition        [ ] Underweight / BMI <19        [ ] Morbid Obesity / BMI > 40      Findings:  pt feeling a bit nausea after eating jellow.  Pt with inadequate intake current (x 1 day) 2/2 abd pain with N/V.  Prior to current episode, pt endorses good appetite/PO intake since last admission in May 2018.  Pt remains with same NFPE. NFPE noted for mild temporal and clavicle muscle wasting and moderate calf muscle wasting.  pt with mild edema in Left leg.  Pt meets criteria for mild malnutrition in context of chronic illness with BMI 19.4.     Findings as based on:  •  Comprehensive nutrition assessment and consultation  •  Calorie counts (nutrient intake analysis)  •  Food acceptance and intake status from observations by staff  •  Follow up  •  Patient education  •  Intervention secondary to interdisciplinary rounds  •   concerns      Treatment:    The following diet has been recommended:  1) as feasible, advance diet to regular with high protein snack between meals 2) monitor PO intake/tolerance closely  3) weekly wt checks    PROVIDER Section:     By signing this assessment you are acknowledging and agree with the diagnosis/diagnoses assigned by the Registered Dietitian    Comments:

## 2018-06-21 NOTE — H&P ADULT - NSHPLABSRESULTS_GEN_ALL_CORE
10.2   3.14  )-----------( 45       ( 20 Jun 2018 23:07 )             31.8   06-20    139  |  105  |  37<H>  ----------------------------<  139<H>  4.2   |  26  |  1.27    Ca    8.6      20 Jun 2018 23:07  Phos  2.5     06-20  Mg     1.7     06-20    TPro  5.4<L>  /  Alb  2.7<L>  /  TBili  0.7  /  DBili  x   /  AST  19  /  ALT  12  /  AlkPhos  121<H>  06-20

## 2018-06-21 NOTE — ED ADULT NURSE REASSESSMENT NOTE - NS ED NURSE REASSESS COMMENT FT1
Report given to Annmarie Varela RN (resource nurse). Pt moved to [facility] from [facility].

## 2018-06-21 NOTE — ED ADULT NURSE REASSESSMENT NOTE - NS ED NURSE REASSESS COMMENT FT1
Received care of pt from Radha Bond RN. Pt is admitted to hospital, awaiting orders from hospitalist. Pt updated on plan of care. Pt is A&Ox3. VS as charted

## 2018-06-21 NOTE — H&P ADULT - ASSESSMENT
SUDDEN ABDOMINAL PAIN AFTER MEAL ( TUNA , SHORTCAKE)  MERKEL CELL CARCINOMA  CHRONIC THROMBOCYTOPENIA  RENAL TRANSPLANT  HTN    - persistent pain but negative CT  - check plain xray and US (pain has genital irradiation)  - was triggered by food  - I would use IVF and clear liquid for now  - one time fever s/p Dapto; I have no reason to c/w abx  - renal transplant pt renal aware; on low dose prednisone  - sinus tach: BB withdrawal; can't use 75 bid; borderline BP; i will give her 25 mg SUDDEN ABDOMINAL PAIN AFTER MEAL ( TUNA , SHORTCAKE)  MERKEL CELL CARCINOMA s/p chemo a week ago; metastatic  IATROGENI THROMBOCYTOPENIA- s/p chemo carboplatin  RENAL TRANSPLANT  HTN    - persistent pain but negative CT  - check plain xray and US (pain has genital irradiation)  - was triggered by food  - I would use IVF and clear liquid for now  - one time fever s/p Dapto; I have no reason to c/w abx  - renal transplant pt renal aware; on low dose prednisone  - sinus tach: BB withdrawal; can't use 75 bid; borderline BP; i will give her 25 mg SUDDEN ABDOMINAL PAIN AFTER MEAL ( TUNA , SHORTCAKE)  MERKEL CELL CARCINOMA s/p chemo a week ago; metastatic  IATROGENI THROMBOCYTOPENIA- s/p chemo carboplatin  RENAL TRANSPLANT  HTN  ABNORMAL CT CHEST- INCIDENTAL FINDINGS    - persistent pain but negative CT  - check plain xray and US (pain has genital irradiation)  - was triggered by food  - I would use IVF and clear liquid for now  - one time fever s/p Dapto; I have no reason to c/w abx  - renal transplant pt renal aware; on low dose prednisone  - sinus tach: BB withdrawal; can't use 75 bid; borderline BP; received total of 50 due to borderline BP  - pericardial  effusion: was present before : Echo reported mod peric effusion 5/18; in view of her tachycardia I will transf her to tele ; before she had MAT; suspect AF now; overall as 1745 she feels better- abd pain but has more distention; residual urinary vol is 350; straight cath. Pt had no SOB when she was admitted. The IVF might have caused tachycardia  - Lasix 40 iv x 1   - cardio was consulted stat

## 2018-06-21 NOTE — H&P ADULT - HISTORY OF PRESENT ILLNESS
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  Pt reports diffuse metastasis throughout the chest and abdomen, yet does not suffer chronic abdominal pain.  Pt c/o abdominal pain, diffuse throughout the entire abdomen since about 6pm after eating.  +nausea with vomiting x 4.  No diarrhea.  +chronic constipation.  Has a Rx for oxycodone, which she has never taken, until tonight when she took a dose which did not provide relief.  Past surgical hx significant for hernia repair.  Pain is located in the epigastric area and lower abd with vaginal irradiation at times; relieved by morphine. 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  Pt reports diffuse metastasis throughout the chest and abdomen, yet does not suffer chronic abdominal pain.  Pt c/o abdominal pain, diffuse throughout the entire abdomen since about 6pm after eating.  +nausea with vomiting x 4.  No diarrhea.  +chronic constipation.  Has a Rx for oxycodone, which she has never taken, until tonight when she took a dose which did not provide relief.  Past surgical hx significant for hernia repair.  Pain is located in the epigastric area and lower abd with vaginal irradiation at times; relieved by morphine. Had a temp of 101 in ED and received Dapto  Pt is off Imuran due to recent chemo; she received Etoposide and Carboplatin 1 week ago at [place].

## 2018-06-21 NOTE — H&P ADULT - NSHPPHYSICALEXAM_GEN_ALL_CORE
· ENMT: Airway patent, Nasal mucosa clear. Mouth with dry oral mucosa.  · EYES: Clear bilaterally, pupils equal, round and reactive to light.  · CARDIAC: Normal rate, regular rhythm.  Heart sounds S1, S2.  · RESPIRATORY: Breath sounds clear and equal bilaterally.  · GASTROINTESTINAL: Abdomen soft diffusely tender to palpation; decreased BS  · MUSCULOSKELETAL: Spine appears normal, range of motion is not limited, no muscle or joint tenderness  · NEUROLOGICAL: Alert and oriented, no focal deficits, no motor or sensory deficits.  · SKIN: pale, multiple dry wounds/skin cancer per pt, lower extr  · PSYCHIATRIC: Alert and oriented to person, place, time/situation. normal mood and affect. no apparent risk to self or others.  · HEME LYMPH: No adenopathy or splenomegaly. No cervical or inguinal lymphadenopathy.

## 2018-06-22 LAB
ADD ON TEST-SPECIMEN IN LAB: SIGNIFICANT CHANGE UP
ALLERGY+IMMUNOLOGY DIAG STUDY NOTE: SIGNIFICANT CHANGE UP
ANION GAP SERPL CALC-SCNC: 11 MMOL/L — SIGNIFICANT CHANGE UP (ref 5–17)
ANISOCYTOSIS BLD QL: SLIGHT — SIGNIFICANT CHANGE UP
BASOPHILS # BLD AUTO: 0 K/UL — SIGNIFICANT CHANGE UP (ref 0–0.2)
BASOPHILS NFR BLD AUTO: 0 % — SIGNIFICANT CHANGE UP (ref 0–2)
BUN SERPL-MCNC: 39 MG/DL — HIGH (ref 7–23)
CALCIUM SERPL-MCNC: 8.4 MG/DL — LOW (ref 8.5–10.1)
CHLORIDE SERPL-SCNC: 110 MMOL/L — HIGH (ref 96–108)
CO2 SERPL-SCNC: 19 MMOL/L — LOW (ref 22–31)
CREAT SERPL-MCNC: 1.55 MG/DL — HIGH (ref 0.5–1.3)
CULTURE RESULTS: NO GROWTH — SIGNIFICANT CHANGE UP
DACRYOCYTES BLD QL SMEAR: SLIGHT — SIGNIFICANT CHANGE UP
ELLIPTOCYTES BLD QL SMEAR: SLIGHT — SIGNIFICANT CHANGE UP
EOSINOPHIL # BLD AUTO: 0 K/UL — SIGNIFICANT CHANGE UP (ref 0–0.5)
EOSINOPHIL NFR BLD AUTO: 0 % — SIGNIFICANT CHANGE UP (ref 0–6)
ERYTHROCYTE [SEDIMENTATION RATE] IN BLOOD: 81 MM/HR — HIGH (ref 0–20)
GLUCOSE SERPL-MCNC: 134 MG/DL — HIGH (ref 70–99)
HAPTOGLOB SERPL-MCNC: 254 MG/DL — HIGH (ref 34–200)
HCT VFR BLD CALC: 27.5 % — LOW (ref 34.5–45)
HGB BLD-MCNC: 8.8 G/DL — LOW (ref 11.5–15.5)
IRON SATN MFR SERPL: 13 UG/DL — LOW (ref 30–160)
IRON SATN MFR SERPL: 9 % — LOW (ref 14–50)
LYMPHOCYTES # BLD AUTO: 0.49 K/UL — LOW (ref 1–3.3)
LYMPHOCYTES # BLD AUTO: 51 % — HIGH (ref 13–44)
MACROCYTES BLD QL: SIGNIFICANT CHANGE UP
MANUAL SMEAR VERIFICATION: SIGNIFICANT CHANGE UP
MCHC RBC-ENTMCNC: 32 GM/DL — SIGNIFICANT CHANGE UP (ref 32–36)
MCHC RBC-ENTMCNC: 33 PG — SIGNIFICANT CHANGE UP (ref 27–34)
MCV RBC AUTO: 103 FL — HIGH (ref 80–100)
MONOCYTES # BLD AUTO: 0.07 K/UL — SIGNIFICANT CHANGE UP (ref 0–0.9)
MONOCYTES NFR BLD AUTO: 7 % — SIGNIFICANT CHANGE UP (ref 2–14)
NEUTROPHILS # BLD AUTO: 0.41 K/UL — LOW (ref 1.8–7.4)
NEUTROPHILS NFR BLD AUTO: 38 % — LOW (ref 43–77)
NEUTS BAND # BLD: 4 % — SIGNIFICANT CHANGE UP (ref 0–8)
NRBC # BLD: 0 /100 WBCS — SIGNIFICANT CHANGE UP (ref 0–0)
NRBC # BLD: 1 /100 — HIGH (ref 0–0)
PLAT MORPH BLD: NORMAL — SIGNIFICANT CHANGE UP
PLATELET # BLD AUTO: 16 K/UL — CRITICAL LOW (ref 150–400)
POIKILOCYTOSIS BLD QL AUTO: SLIGHT — SIGNIFICANT CHANGE UP
POLYCHROMASIA BLD QL SMEAR: SLIGHT — SIGNIFICANT CHANGE UP
POTASSIUM SERPL-MCNC: 4.9 MMOL/L — SIGNIFICANT CHANGE UP (ref 3.5–5.3)
POTASSIUM SERPL-SCNC: 4.9 MMOL/L — SIGNIFICANT CHANGE UP (ref 3.5–5.3)
RBC # BLD: 2.67 M/UL — LOW (ref 3.8–5.2)
RBC # FLD: 19.9 % — HIGH (ref 10.3–14.5)
RBC BLD AUTO: ABNORMAL
SODIUM SERPL-SCNC: 140 MMOL/L — SIGNIFICANT CHANGE UP (ref 135–145)
SPECIMEN SOURCE: SIGNIFICANT CHANGE UP
TIBC SERPL-MCNC: 147 UG/DL — LOW (ref 220–430)
UIBC SERPL-MCNC: 134 UG/DL — SIGNIFICANT CHANGE UP (ref 110–370)
WBC # BLD: 0.97 K/UL — CRITICAL LOW (ref 3.8–10.5)
WBC # FLD AUTO: 0.97 K/UL — CRITICAL LOW (ref 3.8–10.5)

## 2018-06-22 PROCEDURE — 93306 TTE W/DOPPLER COMPLETE: CPT | Mod: 26

## 2018-06-22 PROCEDURE — 93010 ELECTROCARDIOGRAM REPORT: CPT

## 2018-06-22 RX ORDER — CEFEPIME 1 G/1
1000 INJECTION, POWDER, FOR SOLUTION INTRAMUSCULAR; INTRAVENOUS EVERY 12 HOURS
Qty: 0 | Refills: 0 | Status: DISCONTINUED | OUTPATIENT
Start: 2018-06-22 | End: 2018-06-23

## 2018-06-22 RX ORDER — VANCOMYCIN HCL 1 G
125 VIAL (EA) INTRAVENOUS EVERY 6 HOURS
Qty: 0 | Refills: 0 | Status: DISCONTINUED | OUTPATIENT
Start: 2018-06-22 | End: 2018-06-25

## 2018-06-22 RX ORDER — METOPROLOL TARTRATE 50 MG
25 TABLET ORAL ONCE
Qty: 0 | Refills: 0 | Status: COMPLETED | OUTPATIENT
Start: 2018-06-22 | End: 2018-06-22

## 2018-06-22 RX ORDER — METOPROLOL TARTRATE 50 MG
50 TABLET ORAL DAILY
Qty: 0 | Refills: 0 | Status: DISCONTINUED | OUTPATIENT
Start: 2018-06-23 | End: 2018-06-25

## 2018-06-22 RX ORDER — FUROSEMIDE 40 MG
40 TABLET ORAL ONCE
Qty: 0 | Refills: 0 | Status: COMPLETED | OUTPATIENT
Start: 2018-06-22 | End: 2018-06-22

## 2018-06-22 RX ADMIN — MORPHINE SULFATE 4 MILLIGRAM(S): 50 CAPSULE, EXTENDED RELEASE ORAL at 23:12

## 2018-06-22 RX ADMIN — ONDANSETRON 4 MILLIGRAM(S): 8 TABLET, FILM COATED ORAL at 00:41

## 2018-06-22 RX ADMIN — Medication 100 MILLIGRAM(S): at 22:22

## 2018-06-22 RX ADMIN — MORPHINE SULFATE 4 MILLIGRAM(S): 50 CAPSULE, EXTENDED RELEASE ORAL at 02:00

## 2018-06-22 RX ADMIN — ONDANSETRON 4 MILLIGRAM(S): 8 TABLET, FILM COATED ORAL at 17:49

## 2018-06-22 RX ADMIN — Medication 125 MILLIGRAM(S): at 17:10

## 2018-06-22 RX ADMIN — MAGNESIUM OXIDE 400 MG ORAL TABLET 400 MILLIGRAM(S): 241.3 TABLET ORAL at 11:43

## 2018-06-22 RX ADMIN — Medication 125 MILLIGRAM(S): at 22:22

## 2018-06-22 RX ADMIN — Medication 100 MILLIGRAM(S): at 06:24

## 2018-06-22 RX ADMIN — PANTOPRAZOLE SODIUM 40 MILLIGRAM(S): 20 TABLET, DELAYED RELEASE ORAL at 11:43

## 2018-06-22 RX ADMIN — Medication 25 MILLIGRAM(S): at 17:49

## 2018-06-22 RX ADMIN — CEFEPIME 1000 MILLIGRAM(S): 1 INJECTION, POWDER, FOR SOLUTION INTRAMUSCULAR; INTRAVENOUS at 17:10

## 2018-06-22 RX ADMIN — Medication 5 MILLIGRAM(S): at 06:24

## 2018-06-22 RX ADMIN — Medication 25 MILLIGRAM(S): at 23:12

## 2018-06-22 RX ADMIN — Medication 25 MILLIGRAM(S): at 06:24

## 2018-06-22 RX ADMIN — Medication 40 MILLIGRAM(S): at 17:10

## 2018-06-22 RX ADMIN — MORPHINE SULFATE 4 MILLIGRAM(S): 50 CAPSULE, EXTENDED RELEASE ORAL at 01:09

## 2018-06-22 RX ADMIN — MORPHINE SULFATE 4 MILLIGRAM(S): 50 CAPSULE, EXTENDED RELEASE ORAL at 22:21

## 2018-06-22 NOTE — CONSULT NOTE ADULT - SUBJECTIVE AND OBJECTIVE BOX
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  Pt reports diffuse metastasis throughout the chest and abdomen, yet does not suffer chronic abdominal pain.  Pt c/o abdominal pain, diffuse throughout the entire abdomen since about 6pm after eating.  +nausea with vomiting x 4.  No diarrhea.  +chronic constipation.  Has a Rx for oxycodone, which she has never taken, until tonight when she took a dose which did not provide relief.  Past surgical hx significant for hernia repair.  Pain is located in the epigastric area and lower abd with vaginal irradiation at times; relieved by morphine. Had a temp of 101 in ED and received Dapto  Pt is off Imuran due to recent chemo; she received Etoposide and Carboplatin 1 week ago at [place]. (2018 10:20)    -  seems short of breath but she states that is because she was having alot of abdominal pain.    states she seem to get sick on wednesday and has progressively gotten worse.       PAST MEDICAL & SURGICAL HISTORY:  MVP (mitral valve prolapse)  Raynaud's disease  Squamous cell carcinoma: multiple extensive lesions of torso-all extremities-face/scalp  Hypertension  Hyperlipidemia  Polycystic kidney disease  Fibroid uterus  H/O lumpectomy: left  History of incisional hernia repair  History of kidney transplant: left  History of Mohs surgery for squamous cell carcinoma of skin: recent left arm procedure-multiple Moh&#x27;s procedure in the past    MEDICATIONS  (STANDING):  aspirin enteric coated 81 milliGRAM(s) Oral daily  cefepime  Injectable. 1000 milliGRAM(s) IV Push every 12 hours  docusate sodium 100 milliGRAM(s) Oral three times a day  magnesium oxide 400 milliGRAM(s) Oral daily  pantoprazole  Injectable 40 milliGRAM(s) IV Push daily  predniSONE   Tablet 5 milliGRAM(s) Oral daily  sodium chloride 0.9% Bolus 500 milliLiter(s) IV Bolus every 15 minutes  vancomycin    Solution 125 milliGRAM(s) Oral every 6 hours    MEDICATIONS  (PRN):  acetaminophen   Tablet 650 milliGRAM(s) Oral every 6 hours PRN pain fever  aluminum hydroxide/magnesium hydroxide/simethicone Suspension 30 milliLiter(s) Oral every 4 hours PRN Dyspepsia  morphine  - Injectable 4 milliGRAM(s) IV Push every 3 hours PRN Moderate Pain (4 - 6)  ondansetron Injectable 4 milliGRAM(s) IV Push every 6 hours PRN Nausea    Allergies    bacitracin (Rash)  Levaquin (Sedation/Somnol; Muscle Pain)  tetracyclines (Other)    Intolerances      FAMILY HISTORY:  No pertinent family history in first degree relatives        REVIEW OF SYSTEMS:    CONSTITUTIONAL: No weakness, fevers or chills  EYES/ENT: No visual changes;  No vertigo or throat pain   NECK: No pain or stiffness  RESPIRATORY: No cough, wheezing, hemoptysis; No shortness of breath  CARDIOVASCULAR: No chest pain or palpitations  GASTROINTESTINAL: No abdominal or epigastric pain. No nausea, vomiting, or hematemesis; No diarrhea or constipation. No melena or hematochezia.  GENITOURINARY: No dysuria, frequency or hematuria  NEUROLOGICAL: No numbness or weakness  SKIN: No itching, burning, rashes, or lesions   All other review of systems is negative unless indicated above      PHYSICAL EXAM:  Daily     Daily Weight in k.8 (2018 07:25)  Vital Signs Last 24 Hrs  T(C): 36.6 (2018 21:35), Max: 37 (2018 14:16)  T(F): 97.9 (2018 21:35), Max: 98.6 (2018 14:16)  HR: 149 (2018 21:35) (91 - 152)  BP: 134/86 (2018 21:35) (98/62 - 134/86)  BP(mean): --  RR: 18 (2018 21:35) (17 - 18)  SpO2: 96% (2018 21:35) (94% - 99%)    Constitutional: NAD, awake and alert, well-developed  HEENT: PERR, EOMI, Normal Hearing, MMM  Neck: Soft and supple, No LAD, No JVD  Respiratory: Breath sounds are clear bilaterally, No wheezing, rales or rhonchi  Cardiovascular: S1 and S2, regular rate and rhythm, no Murmurs, gallops or rubs  Gastrointestinal: Bowel Sounds present, soft, nontender, nondistended, no guarding, no rebound  Extremities: No peripheral edema  Vascular: 2+ peripheral pulses  Neurological: A/O x 3, no focal deficits  Musculoskeletal: 5/5 strength b/l upper and lower extremities  Skin: No rashes    LABS: All Labs Reviewed:                        8.8    0.97  )-----------( 16       ( 2018 06:16 )             27.5     06-22    140  |  110<H>  |  39<H>  ----------------------------<  134<H>  4.9   |  19<L>  |  1.55<H>    Ca    8.4<L>      2018 06:16  Phos  2.5     -  Mg     1.7     -    TPro  5.4<L>  /  Alb  2.7<L>  /  TBili  0.7  /  DBili  x   /  AST  19  /  ALT  12  /  AlkPhos  121<H>  -          Blood Culture: Organism --  Gram Stain Blood -- Gram Stain --  Specimen Source .Urine Clean Catch (Midstream)  Culture-Blood --    Organism --  Gram Stain Blood -- Gram Stain --  Specimen Source .Blood None  Culture-Blood --    Organism --  Gram Stain Blood -- Gram Stain --  Specimen Source .Blood None  Culture-Blood --        RADIOLOGY: < from: US Abdomen Complete (18 @ 11:52) >  EXAM:  US COMPLETE ABDOMEN SONOGRAM                            PROCEDURE DATE:  2018          INTERPRETATION:      Ultrasound of the abdomen         CLINICAL INFORMATION:  Diffuse abdominal pain after eating, came of   therapy for Merkel cell        TECHNIQUE:   Transcutaneous ultrasonography of the abdomen was performed.    FINDINGS:    No previous examinations are available for review.    The liver demonstrates heterogeneous echotexture with multiple liver   cysts.  Hepatic size and contours are maintained.  The main hepatic and   portal veins appear patent.  There is mild intrahepatic biliary ductal   dilatation. The common bile duct is dilated measuring 1.3 cm. The   gallbladder is distended without calculi. The wall is thickenedmeasuring   0.45 cm.  The pancreas appears intact, allowing for the limited   evaluation by the ultrasound technique.  The spleen size is normal.    The right kidney measures 17.4 cm in length.  It demonstrates multiple   cysts consistent with polycystic kidney disease. there is no   hydronephrosis.     The left kidney measures 15.3 cm in length.  It demonstrates small cysts   consistent with polycystic kidney disease. There is no hydronephrosis.    The LEFT renal transplant measures 11.6 cm and contains a 1.7 cm cyst in   the midpole. The transplant LEFT renal artery and vein are intact.    The abdominal aorta measures normal caliber at maximum anterior to   posterior.  The retroperitoneal structures and IVC appear intact.    Bilateral pleural effusions are noted.      IMPRESSION:   Multiple hepatic cysts. Polycystic renal disease with LEFT   renal transplant which appears to be intact. Mild intra and extrahepatic   biliary ductal dilatation. Thickened gallbladder wall which may be   reactive due to fluid imbalance. Bilateral pleural effusions.          < end of copied text >  < from: CT Abdomen and Pelvis No Cont (18 @ 00:36) >  EXAM:  CT ABDOMEN AND PELVIS                            PROCEDURE DATE:  2018          INTERPRETATION:  CT ABDOMEN AND PELVIS WITHOUT CONTRAST    INDICATION: Abdominal pain.    TECHNIQUE: Abdominopelvic CT without intravenous contrast.    COMPARISON 2018.    FINDINGS:    Evaluation of the solid organs and vasculature limited in the absence of   intravenous contrast.     Lower Thorax: Small right pleural effusion with adjacent probable   atelectasis although consolidation is possible, slightly increased from   prior exam. Cardiomegaly with moderate pericardial effusion which is   grossly stable.        Liver: Innumerable hypodense cysts throughout the liver.  Biliary: No dilatation. Cholelithiasis.  Spleen: No suspicious lesions.   Pancreas: No inflammatory changes or ductal dilatation.      Adrenals: Normal.      Kidneys: No hydronephrosis or solid mass. Polycystic disease. Stable   appearance of left lower quadrant renal transplant with possible vascular   aneurysm.  Vessels: Normal caliber.        GI tract: No evidence of small bowel obstruction. No wall thickening or   inflammatory changes.        Peritoneum/retroperitoneum and mesentery: No free air. No organized fluid   collection. No adenopathy. Small volume ascites.    Pelvic organs/Bladder: No pelvic masses. Bladder is normal.        Abdominal wall: Unremarkable.  Bones and soft tissues: No destructive lesion. Mild anasarca.   Degenerative changes of the spine. Grade 1 anterolisthesis of L4.    IMPRESSION:  No evidence of obstruction or definite evidence of bowel inflammation.    Small right pleural effusion with moderate pericardial effusion. Small   volume ascites and anasarca.    No other interval change from prior exam. Polycystic kidney and liver   disease.    < end of copied text >    EKG:< from: 12 Lead ECG (18 @ 17:40) >  Ventricular Rate 125 BPM    Atrial Rate 125 BPM    P-R Interval 176 ms    QRS Duration 86 ms    Q-T Interval 280 ms    QTC Calculation(Bezet) 404 ms    P Axis 11 degrees    R Axis 60 degrees    T Axis 126 degrees    Diagnosis Line Sinus tachycardiawith Premature atrial complexes  Anteroseptal infarct (cited on or before 04-MAY-2018)  T wave abnormality, consider lateral ischemia  Abnormal ECG  Confirmed by MANNY BURTON MD ([id number]) on 2018 8:35:50 AM    < end of copied text >      CARDIOLOGY TESTING:  < from: Transthoracic Echocardiogram (18 @ 08:09) >   EXAM:  2D ECHOCARDIOGRAM AD         PROCEDURE DATE:  2018        INTERPRETATION:  Transthoracic Echocardiography Report (TTE)     Demographics     Patient name       JUICE CHU       Age           67 year(s)     Med Rec #          [id number]            Gender        Female     Account #          [id number]              Date of Birth 1950     Interpreting       Veronica Black, Room Number   0010   Physician          MD     Referring          ANAND ALBERTO MD   Sonographer   Jonathan Vargas,   Physician                                             RDCS     Date of study      2018 07:55 AM     Height             65.98 in             Weight        120.15 pounds    Type of Study:     TTE procedure: 2D echocardiogram AD     BP: 112/65 mmHg     Study Location: 1NTechnical Quality: Limited study    Indications   1) I31.3 - Pericardial effusion noninflammatory    M-Mode Measurements (cm)     LVEDd: 5.42 cm                       LVESd: 4.67 cm   IVSEd: 1.15 cm   LVPWd:1.15 cm     Findings     Left Ventricle   Limited study to asses pericardial effusion.   Estimated left ventricular ejection fraction is 40 %, function is   decreased since prior study 2018   Prominent D shape of the interventricular septum in both systole and   diastole suggestive of RV pressure and volume overload.     Pericardial Effusion   A moderate-sized pericardial effusion is present.   No Significant changes since prior study.     Pleural Effusion   Pleural effusion cannot be ruledout.     Miscellaneous   IVC is collapsing with inspiration.     Impression     Summary     Limited study to asses pericardial effusion.   Estimated left ventricular ejection fraction is 40 %, function is   decreased since prior study 2018.   Prominent D shape of the interventricular septum in both systole and   diastole suggestive of RV pressure and volume overload.     Signature     ----------------------------------------------------------------   Electronically signed by Veronica Black MD(Interpreting   physician) on 2018 02:08 PM   ---------------------------------------------------    < end of copied text >

## 2018-06-22 NOTE — CONSULT NOTE ADULT - ASSESSMENT
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  Pt reports diffuse metastasis throughout the chest and abdomen, yet does not suffer chronic abdominal pain.  Pt c/o abdominal pain, diffuse throughout the entire abdomen since about 6pm after eating.  +nausea with vomiting x 4.  No diarrhea.  +chronic constipation.  Has a Rx for oxycodone, which she has never taken, until tonight when she took a dose which did not provide relief.  Past surgical hx significant for hernia repair.  Pain is located in the epigastric area and lower abd with vaginal irradiation at times; relieved by morphine. Had a temp of 101 in ED and received Dapto  Pt is off Imuran due to recent chemo; she received Etoposide and Carboplatin 1 week ago at [place]. (21 Jun 2018 10:20)    6/22-  Echo suggests that the pericardial effusion is unchanged from an older study. Although, there is signs of RV compromise.   Will discuss pericardial with the team. She is clinically asymptomatic but concerned that if we don't drain the pericardium, she will be in trouble down the road.

## 2018-06-22 NOTE — PROGRESS NOTE ADULT - SUBJECTIVE AND OBJECTIVE BOX
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  Pt reports diffuse metastasis throughout the chest and abdomen, yet does not suffer chronic abdominal pain.  Pt c/o abdominal pain, diffuse throughout the entire abdomen since about 6pm after eating.  +nausea with vomiting x 4.  No diarrhea.  +chronic constipation.  Has a Rx for oxycodone, which she has never taken, until tonight when she took a dose which did not provide relief.  Past surgical hx significant for hernia repair.  Pain is located in the epigastric area and lower abd with vaginal irradiation at times; relieved by morphine. Had a temp of 101 in ED and received Dapto  Pt is off Imuran due to recent chemo; she received Etoposide and Carboplatin 1 week ago at [place]. (21 Jun 2018 10:20)    6/22: louann was transferred yesterday from [facility] for tachycardia.  She continues to have generalized abdominal pain.  this morning labs showed pancytopenia; Called Dr Francois's offce [phone] and spoke with the nurse who will fax over the information      Allergies:  bacitracin (Rash)  Levaquin (Sedation/Somnol; Muscle Pain)  tetracyclines (Other)    REVIEW OF SYSTEMS:  See HPI. All other review of systems is negative unless indicated above.     OBJECTIVE  Physical Exam:  Vital Signs Last 24 Hrs  T(C): 36.6 (22 Jun 2018 17:06), Max: 37 (22 Jun 2018 14:16)  T(F): 97.9 (22 Jun 2018 17:06), Max: 98.6 (22 Jun 2018 14:16)  HR: 151 (22 Jun 2018 17:06) (79 - 151)  BP: 118/71 (22 Jun 2018 17:06) (98/62 - 124/81)  BP(mean): --  RR: 18 (22 Jun 2018 17:06) (16 - 18)  SpO2: 98% (22 Jun 2018 17:06) (93% - 100%)    Constitutional: NAD, awake and alert, well-developed  Neurological: AAO x 3, no focal deficits  HEENT: PERRLA, EOMI, MMM  Neck: Soft and supple, No LAD, No JVD  Respiratory: Breath sounds are clear bilaterally, No wheezing, rales or rhonchi  Cardiovascular: S1 and S2, regular rate and rhythm; no Murmurs, gallops or rubs  Gastrointestinal: Bowel Sounds present, soft, nontender, nondistended, no guarding, no rebound tenderness  Back: No CVA tenderness   Extremities: No peripheral edema  Vascular: 2+ peripheral pulses  Musculoskeletal: 5/5 strength b/l upper and lower extremities  Skin: No rashes  Breast: Deferred  Rectal: Deferred    MEDICATIONS  (STANDING):  aspirin enteric coated 81 milliGRAM(s) Oral daily  cefepime  Injectable. 1000 milliGRAM(s) IV Push every 12 hours  docusate sodium 100 milliGRAM(s) Oral three times a day  furosemide   Injectable 40 milliGRAM(s) IV Push once  magnesium oxide 400 milliGRAM(s) Oral daily  metoprolol succinate ER 25 milliGRAM(s) Oral daily  pantoprazole  Injectable 40 milliGRAM(s) IV Push daily  predniSONE   Tablet 5 milliGRAM(s) Oral daily  sodium chloride 0.9% Bolus 500 milliLiter(s) IV Bolus every 15 minutes  vancomycin    Solution 125 milliGRAM(s) Oral every 6 hours      LABS: All Labs Reviewed:                        8.8    0.97  )-----------( 16       ( 22 Jun 2018 06:16 )             27.5     06-22    140  |  110<H>  |  39<H>  ----------------------------<  134<H>  4.9   |  19<L>  |  1.55<H>    Ca    8.4<L>      22 Jun 2018 06:16  Phos  2.5     06-20  Mg     1.7     06-20    TPro  5.4<L>  /  Alb  2.7<L>  /  TBili  0.7  /  DBili  x   /  AST  19  /  ALT  12  /  AlkPhos  121<H>  06-20      < from: Transthoracic Echocardiogram (06.22.18 @ 08:09) >     Impression     Summary     Limited study to asses pericardial effusion.   Estimated left ventricular ejection fraction is 40 %, function is   decreased since prior study 05/08/2018.   Prominent D shape of the interventricular septum in both systole and   diastole suggestive of RV pressure and volume overload.     Signature     ----------------------------------------------------------------   Electronically signed by Veronica Black MD(Interpreting   physician) on 06/22/2018 02:08 PM   ----------------------------------------------------------------    < end of copied text >            Blood Culture:   06-21 @ 01:10  Organism --  Gram Stain Blood -- Gram Stain --  Specimen Source .Urine Clean Catch (Midstream)  Culture-Blood --    06-21 @ 01:01  Organism --  Gram Stain Blood -- Gram Stain --  Specimen Source .Blood None  Culture-Blood --    06-21 @ 00:53  Organism --  Gram Stain Blood -- Gram Stain --  Specimen Source .Blood None  Culture-Blood --        RADIOLOGY/EKG:

## 2018-06-22 NOTE — CONSULT NOTE ADULT - SUBJECTIVE AND OBJECTIVE BOX
68 y/o F with a PMHx of PCKD w DDRT - off immunotherapy since March 11 due to spreading cancer  , hx of CMV virema, w active basal and squamous skin cancers with recent dx  Merkel cell carcinoma - was on immunotherapy Avelumab and changed to etopiside and carboplatinum on April 26,27,28 after  PET scan showed lesions in her liver and near her heart. since then admitted in May for Neutropenic fever  since then had resumed Chemo at reduced dose 6 weeks ago    now presenting acute onset of fever , and abd pains w c/o abdominal pain, diffuse throughout the entire abdomen since about 6pm after eating.  +nausea with vomiting x 4.  No diarrhea.  +chronic constipation.  Has a Rx for oxycodone, which she has never taken, until tonight when she took a dose which did not provide relief.  Past surgical hx significant for hernia repair.  Renal eval called for CKD w hx Renal transplant   CKD 3 Cr ~ 1.6- 1.7 while on lasix    today - pt was seen earlier today ~ 11; 30 am   w  and sister at bedside     in painful distress , no nausea , sob or cp     PAST MEDICAL & SURGICAL HISTORY:  MVP (mitral valve prolapse)  Raynaud's disease  Squamous cell carcinoma: multiple extensive lesions of torso-all extremities-face/scalp  Hypertension  Hyperlipidemia  Polycystic kidney disease  Fibroid uterus  H/O lumpectomy: left  History of incisional hernia repair  History of kidney transplant: left  History of Mohs surgery for squamous cell carcinoma of skin: recent left arm procedure-multiple Moh&#x27;s procedure in the past    Home Medications:  acetaminophen 325 mg oral tablet: 2 tab(s) orally every 6 hours, As needed, For pain (11 May 2018 15:26)  aspirin 81 mg oral tablet: 1 tab(s) orally once a day (05 May 2018 01:22)  metoprolol tartrate 75 mg oral tablet: 1 tab(s) orally every 12 hours (11 May 2018 15:26)  predniSONE 5 mg oral tablet: 1 tab(s) orally once a day (05 May 2018 01:22)  simvastatin 20 mg oral tablet: 1 tab(s) orally once a day (at bedtime) (05 May 2018 01:22)    MEDICATIONS  (STANDING):  aspirin enteric coated 81 milliGRAM(s) Oral daily  cefepime  Injectable. 1000 milliGRAM(s) IV Push every 12 hours  docusate sodium 100 milliGRAM(s) Oral three times a day  furosemide   Injectable 40 milliGRAM(s) IV Push once  magnesium oxide 400 milliGRAM(s) Oral daily  metoprolol succinate ER 25 milliGRAM(s) Oral daily  pantoprazole  Injectable 40 milliGRAM(s) IV Push daily  predniSONE   Tablet 5 milliGRAM(s) Oral daily  sodium chloride 0.9% Bolus 500 milliLiter(s) IV Bolus every 15 minutes  vancomycin    Solution 125 milliGRAM(s) Oral every 6 hours      Allergies    bacitracin (Rash)  Levaquin (Sedation/Somnol; Muscle Pain)  tetracyclines (Other)    Intolerances        SOCIAL HISTORY:  Denies ETOh,Smoking,     FAMILY HISTORY:  No pertinent family history in first degree relatives      REVIEW OF SYSTEMS:    CONSTITUTIONAL: No weakness, fevers or chills  EYES/ENT: No visual changes;  No vertigo or throat pain   NECK: No pain or stiffness  RESPIRATORY: No cough, wheezing, hemoptysis; No shortness of breath  CARDIOVASCULAR: No chest pain or palpitations  GASTROINTESTINAL:  No melena or hematochezia.,   GENITOURINARY: No dysuria, frequency or hematuria  NEUROLOGICAL: No numbness   SKIN: No itching, burning, rashes, or lesions   All other review of systems is negative unless indicated above.    VITAL:  ICU Vital Signs Last 24 Hrs  T(C): 37 (22 Jun 2018 14:16), Max: 37 (22 Jun 2018 14:16)  T(F): 98.6 (22 Jun 2018 14:16), Max: 98.6 (22 Jun 2018 14:16)  HR: 144 (22 Jun 2018 14:16) (79 - 144)  BP: 115/67 (22 Jun 2018 14:16) (98/62 - 124/81)  BP(mean): --  ABP: --  ABP(mean): --  RR: 18 (22 Jun 2018 14:16) (16 - 18)  SpO2: 99% (22 Jun 2018 14:16) (93% - 100%)    I&O's Summary    21 Jun 2018 07:01  -  22 Jun 2018 07:00  --------------------------------------------------------  IN: 0 mL / OUT: 1450 mL / NET: -1450 mL  I&O's Detail    21 Jun 2018 07:01  -  22 Jun 2018 07:00  --------------------------------------------------------  IN:  Total IN: 0 mL    OUT:    Indwelling Catheter - Urethral: 400 mL    Voided: 1050 mL  Total OUT: 1450 mL    Total NET: -1450 mL    PHYSICAL EXAM:    Constitutional: frail , in pain ++   HEENT:  EOMI,  MM dry, pale ++   Neck: No LAD, No JVD  Respiratory: poor AE bilat  Cardiovascular: S1 and S2  Gastrointestinal: distended, soft but ++ guarding and tender throughout    Extremities: No peripheral edema  Neurological: A/O x 3, no focal deficits  : ++ Manning  Skin: No rashes  Access: Not applicable    LABS:                                          8.8    0.97  )-----------( 16       ( 22 Jun 2018 06:16 )             27.5                         10.2   3.14  )-----------( 45       ( 20 Jun 2018 23:07 )             31.8     140    |  110    |  39     ----------------------------<  134       22 Jun 2018 06:16  4.9     |  19     |  1.55     139    |  105    |  37     ----------------------------<  139       20 Jun 2018 23:07  4.2     |  26     |  1.27     Ca    8.4        22 Jun 2018 06:16  Ca    8.6        20 Jun 2018 23:07    Phos  2.5       20 Jun 2018 23:07    Urine Studies:  Culture - Urine in AM (06.21.18 @ 01:10)    Specimen Source: .Urine Clean Catch (Midstream)    Culture Results:   No growth    RADIOLOGY & ADDITIONAL STUDIES:    2018          INTERPRETATION:  Exam Date: 6/20/2018 11:41 PM    Chest radiograph (one view)         CLINICAL INFORMATION:  abdominal pain    TECHNIQUE:  Single frontal view of the chest was obtained.    COMPARISON: 5/4/2018    FINDINGS/  IMPRESSION:          Pulmonary vascular congestion and tiny bilateral pleural effusions are   present. Cardiomegaly.

## 2018-06-22 NOTE — CONSULT NOTE ADULT - ASSESSMENT
66 y/o F with a PMHx of Merkel cell carcinoma, HLD, HTN, s/p kidney transplant (17 years ago, off immunotherapy), history of cdiff in past, recent hospitalization in May 2018 for sepsis secondary to Klebsiella admitted on 6/20 for evaluation of acute onset of abdominal pain; patient had chemotherapy with cisplatin and etoposide. Notes nausea no vomiting. No other complaints.   1. Patient admitted with abdominal pain, now neutropenic; unclear source of her pain; possibly secondary to constipation?  - follow up cultures  - consideration to imaging with iv contrast  - serial cbc and monitor temperature   - neutropenic precautions  - will start cefepime 1 gram q 12 hours  - will start po vancomycin to prevent cdiff  - skin lesions do not look infected  - cardiology and nephrology evaluations  2. other issues: Merkel cell carcinoma, HLD, HTN, s/p kidney transplant (17 years ago, off immunotherapy)  - per medicine

## 2018-06-22 NOTE — PROGRESS NOTE ADULT - ASSESSMENT
*Pancytopenia most likely due to chemo  -Transfuse 2 units of PRBC today  -may need plts if <10K or signs of any bleeding  -Neutropenic precautions  -[facility] Evaluation - ?neulasta    *Fever and Abdominal Pain unclear of Etiology ?constipation vs metastasis vs infection  -CT abd  no contrast - NAD  -If no improvement will need CT abd with po and IV contrast - patient will be at high risk for contrast nephropathy; Patient and family understand the risks  -ID consult appreciated  -neutropenic precautions  -continue cefepime 1 gram q 12 hours  -continue po vancomycin to prevent cdif    *Tachycardia 2ndry to anemia and possible infection  -continue tele  -increase BB if BP permits  -cardio consult     *Moderate pericardial effusion, Ascites and ansarca on CT 2ndry to Chronic Systolic CHF  -hold Diuretic due to low BP and ARF  -ECHO Estimated left ventricular ejection fraction is 40 %, function is decreased since prior study 05/08/2018    *Urinary Retention  -Manning Placed  -TOV once stable    *Merkel cell carcinoma.  -On Chemo: Carboplatin and Etoposide   -patients outside [facility] Dr Gutierrez [phone]    *CKD stage 3 - Cr near baseline 1.5 - 1.6 while on lasix  -h/o Renal transplant - not on any immunosupression due to acitve malignancy  -Renal Fx is stable   -Continue prednisone  -Avoid nephrotoxic agents   -if we decide on CT w Dye then would give gentle IV saline preprocedure  -renal consult appreciated    *HTN  -Monitor BP, stable   -Hold amlodipine   -continue Metoprolol    *DVT PPXs: Cannot give chemical Anticoagulation due to very low platelet.  Cannot SCD either due to extensive lesions in both legs.

## 2018-06-22 NOTE — CONSULT NOTE ADULT - SUBJECTIVE AND OBJECTIVE BOX
Patient is a 67y old  Female who presents with a chief complaint of Sent in by Dr. carole Roca for fever (05 May 2018 03:18)    HPI:  68 y/o F with a PMHx of Merkel cell carcinoma, HLD, HTN, s/p kidney transplant (17 years ago, off immunotherapy), history of cdiff in past, recent hospitalization in May 2018 for sepsis secondary to Klebsiella admitted on 6/20 for evaluation of acute onset of abdominal pain; patient had chemotherapy with cisplatin and etoposide. Notes nausea no vomiting. Has constipation, last chemotherapy about 10 days ago. No other complaints.         MEDICATIONS  (STANDING):  aspirin enteric coated 81 milliGRAM(s) Oral daily  cefepime  Injectable. 1000 milliGRAM(s) IV Push every 12 hours  docusate sodium 100 milliGRAM(s) Oral three times a day  furosemide   Injectable 40 milliGRAM(s) IV Push once  magnesium oxide 400 milliGRAM(s) Oral daily  metoprolol succinate ER 25 milliGRAM(s) Oral daily  pantoprazole  Injectable 40 milliGRAM(s) IV Push daily  predniSONE   Tablet 5 milliGRAM(s) Oral daily  sodium chloride 0.9% Bolus 500 milliLiter(s) IV Bolus every 15 minutes  vancomycin    Solution 125 milliGRAM(s) Oral every 6 hours    MEDICATIONS  (PRN):  acetaminophen   Tablet 650 milliGRAM(s) Oral every 6 hours PRN pain fever  aluminum hydroxide/magnesium hydroxide/simethicone Suspension 30 milliLiter(s) Oral every 4 hours PRN Dyspepsia  morphine  - Injectable 4 milliGRAM(s) IV Push every 3 hours PRN Moderate Pain (4 - 6)  ondansetron Injectable 4 milliGRAM(s) IV Push every 6 hours PRN Nausea      Vital Signs Last 24 Hrs  T(C): 36.4 (22 Jun 2018 09:29), Max: 36.8 (21 Jun 2018 19:00)  T(F): 97.6 (22 Jun 2018 09:29), Max: 98.2 (21 Jun 2018 19:00)  HR: 91 (22 Jun 2018 09:29) (79 - 135)  BP: 98/62 (22 Jun 2018 09:29) (98/62 - 131/90)  BP(mean): --  RR: 18 (22 Jun 2018 09:29) (16 - 18)  SpO2: 99% (22 Jun 2018 09:29) (93% - 100%)    Constitutional: frail looking  HEENT: NC/AT, EOMI, PERRLA  Neck: supple  Back: no tenderness  Respiratory: clear  Cardiovascular: S1S2 regular, no murmurs  Abdomen: soft, difusely tender, distended, positive BS  Genitourinary: deferred  Rectal: deferred  Musculoskeletal: no muscle tenderness, no joint swelling or tenderness  Extremities: no pedal edema  Neurological: AxOx3, moving all extremities, no focal deficits  Skin:multiple skin lesions on extremities; dry and  crusting; no pus visible      CBC Full  -  ( 22 Jun 2018 06:16 )  WBC Count : 0.97 K/uL  Hemoglobin : 8.8 g/dL  Hematocrit : 27.5 %  Platelet Count - Automated : 16 K/uL  Mean Cell Volume : 103.0 fl  Mean Cell Hemoglobin : 33.0 pg  Mean Cell Hemoglobin Concentration : 32.0 gm/dL  Auto Neutrophil # : 0.41 K/uL  Auto Lymphocyte # : 0.49 K/uL  Auto Monocyte # : 0.07 K/uL  Auto Eosinophil # : 0.00 K/uL  Auto Basophil # : 0.00 K/uL  Auto Neutrophil % : 38.0 %  Auto Lymphocyte % : 51.0 %  Auto Monocyte % : 7.0 %  Auto Eosinophil % : 0.0 %  Auto Basophil % : 0.0 %    06-22    140  |  110<H>  |  39<H>  ----------------------------<  134<H>  4.9   |  19<L>  |  1.55<H>    Ca    8.4<L>      22 Jun 2018 06:16  Phos  2.5     06-20  Mg     1.7     06-20    TPro  5.4<L>  /  Alb  2.7<L>  /  TBili  0.7  /  DBili  x   /  AST  19  /  ALT  12  /  AlkPhos  121<H>  06-20      Culture - Urine in AM (06.21.18 @ 01:10)    Specimen Source: .Urine Clean Catch (Midstream)    Culture Results:   No growth    Culture - Blood (06.21.18 @ 01:01)    Specimen Source: .Blood None    Culture Results:   No growth to date.        Culture - Blood (06.21.18 @ 00:53)    Specimen Source: .Blood None    Culture Results:   No growth to date.            < from: CT Abdomen and Pelvis No Cont (06.21.18 @ 00:36) >    EXAM:  CT ABDOMEN AND PELVIS                            PROCEDURE DATE:  06/21/2018          INTERPRETATION:  CT ABDOMEN AND PELVIS WITHOUT CONTRAST    INDICATION: Abdominal pain.    TECHNIQUE: Abdominopelvic CT without intravenous contrast.    COMPARISON 5/7/2018.    FINDINGS:    Evaluation of the solid organs and vasculature limited in the absence of   intravenous contrast.     Lower Thorax: Small right pleural effusion with adjacent probable   atelectasis although consolidation is possible, slightly increased from   prior exam. Cardiomegaly with moderate pericardial effusion which is   grossly stable.        Liver: Innumerable hypodense cysts throughout the liver.  Biliary: No dilatation. Cholelithiasis.  Spleen: No suspicious lesions.   Pancreas: No inflammatory changes or ductal dilatation.      Adrenals: Normal.      Kidneys: No hydronephrosis or solid mass. Polycystic disease. Stable   appearance of left lower quadrant renal transplant with possible vascular   aneurysm.  Vessels: Normal caliber.        GI tract: No evidence of small bowel obstruction. No wall thickening or   inflammatory changes.        Peritoneum/retroperitoneum and mesentery: No free air. No organized fluid   collection. No adenopathy. Small volume ascites.    Pelvic organs/Bladder: No pelvic masses. Bladder is normal.        Abdominal wall: Unremarkable.  Bones and soft tissues: No destructive lesion. Mild anasarca.   Degenerative changes of the spine. Grade 1 anterolisthesis of L4.    IMPRESSION:  No evidence of obstruction or definite evidence of bowel inflammation.    Small right pleural effusion with moderate pericardial effusion. Small   volume ascites and anasarca.    No other interval change from prior exam. Polycystic kidney and liver   disease.        < end of copied text >                  Radiology: all available radiological tests reviewed    Advanced directives addressed: full resuscitation

## 2018-06-22 NOTE — CONSULT NOTE ADULT - ASSESSMENT
68 y/o F with a PMHx of HTN, DDRT due to PCKD w  CKD 3  Cr ~ 1.5 - 1.6 w off all immunosupressants since March due to widespread Merkel cell carcinoma on PET Scan now s/p chemo and again readmitted with Nuetropenia and severe abdominal pains - unclear etiology  - constipation /metastases/infection ?    - CXR + PVC - IVF were stopped earlier - pt denies sob   - Ucx - growth   - IV Abx as per Dr Erazo :   - consider GI eval   - if no improvement -  I adivsed family may need further imaging w IV contrast - and if so she is higher risk for contrast nephropathy however have to weight the risk of this vs potential abdominal complications without a diagnosis . Family and pt were aware of risk and willing to have done if necessary    - CT Abdoment per     CKD 3 - Cr near baseline 1.5 - 1.6 while on lasix   renal transplant - not on any immunosupression due to acitve malignancy    continue prednisone for minimal therapy    - hx of plueral effusions/SOB   - OK to give lasix in between PRBC's today    - monitor Cr trend    - no NSAID's   - hold ACE or ARB    - if CT w Dye then would give gentle IV saline preprocedure to mitigate risk    - urine retention - garcia - TOV when stable clinically      HTN  - Monitor BP, stable   - c/w metoprolol for tachycardia - keep sbp > 110    Pancytopenia likely due to chemo    - heme onc eval     - Neulasta?     - transfuse PRBC today     - may need plts if <10K or signs of any bleeding       pt appears ill - low threshold to transfer to higher level of care     d/w family at bedside at length   d/w dr Ann and Dr Erazo     Thank you for the courtesy of this consult. We will follow this patient with you.   Management is subject to change if new information becomes available or patient condition changes.

## 2018-06-23 LAB
ADD ON TEST-SPECIMEN IN LAB: SIGNIFICANT CHANGE UP
ALBUMIN SERPL ELPH-MCNC: 2 G/DL — LOW (ref 3.3–5)
ALP SERPL-CCNC: 65 U/L — SIGNIFICANT CHANGE UP (ref 40–120)
ALT FLD-CCNC: 30 U/L — SIGNIFICANT CHANGE UP (ref 12–78)
ANION GAP SERPL CALC-SCNC: 12 MMOL/L — SIGNIFICANT CHANGE UP (ref 5–17)
AST SERPL-CCNC: 27 U/L — SIGNIFICANT CHANGE UP (ref 15–37)
BASOPHILS # BLD AUTO: 0 K/UL — SIGNIFICANT CHANGE UP (ref 0–0.2)
BASOPHILS NFR BLD AUTO: 0 % — SIGNIFICANT CHANGE UP (ref 0–2)
BILIRUB DIRECT SERPL-MCNC: 0.4 MG/DL — HIGH (ref 0–0.2)
BILIRUB INDIRECT FLD-MCNC: 0.5 MG/DL — SIGNIFICANT CHANGE UP (ref 0.2–1)
BILIRUB SERPL-MCNC: 0.9 MG/DL — SIGNIFICANT CHANGE UP (ref 0.2–1.2)
BUN SERPL-MCNC: 57 MG/DL — HIGH (ref 7–23)
CALCIUM SERPL-MCNC: 8.8 MG/DL — SIGNIFICANT CHANGE UP (ref 8.5–10.1)
CHLORIDE SERPL-SCNC: 108 MMOL/L — SIGNIFICANT CHANGE UP (ref 96–108)
CO2 SERPL-SCNC: 17 MMOL/L — LOW (ref 22–31)
CREAT SERPL-MCNC: 2.21 MG/DL — HIGH (ref 0.5–1.3)
EOSINOPHIL # BLD AUTO: 0 K/UL — SIGNIFICANT CHANGE UP (ref 0–0.5)
EOSINOPHIL NFR BLD AUTO: 0 % — SIGNIFICANT CHANGE UP (ref 0–6)
FERRITIN SERPL-MCNC: 887 NG/ML — HIGH (ref 15–150)
FOLATE SERPL-MCNC: 13.8 NG/ML — SIGNIFICANT CHANGE UP
GLUCOSE SERPL-MCNC: 173 MG/DL — HIGH (ref 70–99)
HCT VFR BLD CALC: 32.8 % — LOW (ref 34.5–45)
HGB BLD-MCNC: 11 G/DL — LOW (ref 11.5–15.5)
LIDOCAIN IGE QN: 72 U/L — LOW (ref 73–393)
LYMPHOCYTES # BLD AUTO: 0.22 K/UL — LOW (ref 1–3.3)
LYMPHOCYTES # BLD AUTO: 32 % — SIGNIFICANT CHANGE UP (ref 13–44)
MAGNESIUM SERPL-MCNC: 2.2 MG/DL — SIGNIFICANT CHANGE UP (ref 1.6–2.6)
MANUAL SMEAR VERIFICATION: SIGNIFICANT CHANGE UP
MCHC RBC-ENTMCNC: 31.3 PG — SIGNIFICANT CHANGE UP (ref 27–34)
MCHC RBC-ENTMCNC: 33.5 GM/DL — SIGNIFICANT CHANGE UP (ref 32–36)
MCV RBC AUTO: 93.2 FL — SIGNIFICANT CHANGE UP (ref 80–100)
METAMYELOCYTES # FLD: 4 % — HIGH (ref 0–0)
MONOCYTES # BLD AUTO: 0.14 K/UL — SIGNIFICANT CHANGE UP (ref 0–0.9)
MONOCYTES NFR BLD AUTO: 20 % — HIGH (ref 2–14)
NEUTROPHILS # BLD AUTO: 0.3 K/UL — LOW (ref 1.8–7.4)
NEUTROPHILS NFR BLD AUTO: 44 % — SIGNIFICANT CHANGE UP (ref 43–77)
NRBC # BLD: 8 /100 — HIGH (ref 0–0)
NRBC # BLD: SIGNIFICANT CHANGE UP /100 WBCS (ref 0–0)
PHOSPHATE SERPL-MCNC: 5.9 MG/DL — HIGH (ref 2.5–4.5)
PLAT MORPH BLD: NORMAL — SIGNIFICANT CHANGE UP
PLATELET # BLD AUTO: 10 K/UL — CRITICAL LOW (ref 150–400)
POTASSIUM SERPL-MCNC: 4.8 MMOL/L — SIGNIFICANT CHANGE UP (ref 3.5–5.3)
POTASSIUM SERPL-SCNC: 4.8 MMOL/L — SIGNIFICANT CHANGE UP (ref 3.5–5.3)
PROT SERPL-MCNC: 5.4 GM/DL — LOW (ref 6–8.3)
RBC # BLD: 3.52 M/UL — LOW (ref 3.8–5.2)
RBC # FLD: 24 % — HIGH (ref 10.3–14.5)
RBC BLD AUTO: SIGNIFICANT CHANGE UP
SODIUM SERPL-SCNC: 137 MMOL/L — SIGNIFICANT CHANGE UP (ref 135–145)
VIT B12 SERPL-MCNC: 1937 PG/ML — HIGH (ref 232–1245)
WBC # BLD: 0.69 K/UL — CRITICAL LOW (ref 3.8–10.5)
WBC # FLD AUTO: 0.69 K/UL — CRITICAL LOW (ref 3.8–10.5)

## 2018-06-23 PROCEDURE — 93010 ELECTROCARDIOGRAM REPORT: CPT

## 2018-06-23 PROCEDURE — 74176 CT ABD & PELVIS W/O CONTRAST: CPT | Mod: 26

## 2018-06-23 RX ORDER — METRONIDAZOLE 500 MG
500 TABLET ORAL ONCE
Qty: 0 | Refills: 0 | Status: COMPLETED | OUTPATIENT
Start: 2018-06-23 | End: 2018-06-23

## 2018-06-23 RX ORDER — PIPERACILLIN AND TAZOBACTAM 4; .5 G/20ML; G/20ML
3.38 INJECTION, POWDER, LYOPHILIZED, FOR SOLUTION INTRAVENOUS EVERY 12 HOURS
Qty: 0 | Refills: 0 | Status: DISCONTINUED | OUTPATIENT
Start: 2018-06-23 | End: 2018-06-25

## 2018-06-23 RX ORDER — MORPHINE SULFATE 50 MG/1
2 CAPSULE, EXTENDED RELEASE ORAL EVERY 4 HOURS
Qty: 0 | Refills: 0 | Status: DISCONTINUED | OUTPATIENT
Start: 2018-06-23 | End: 2018-06-25

## 2018-06-23 RX ORDER — MORPHINE SULFATE 50 MG/1
2 CAPSULE, EXTENDED RELEASE ORAL EVERY 6 HOURS
Qty: 0 | Refills: 0 | Status: DISCONTINUED | OUTPATIENT
Start: 2018-06-23 | End: 2018-06-25

## 2018-06-23 RX ORDER — POLYETHYLENE GLYCOL 3350 17 G/17G
17 POWDER, FOR SOLUTION ORAL ONCE
Qty: 0 | Refills: 0 | Status: COMPLETED | OUTPATIENT
Start: 2018-06-23 | End: 2018-06-23

## 2018-06-23 RX ORDER — SODIUM CHLORIDE 9 MG/ML
1000 INJECTION, SOLUTION INTRAVENOUS
Qty: 0 | Refills: 0 | Status: DISCONTINUED | OUTPATIENT
Start: 2018-06-23 | End: 2018-06-24

## 2018-06-23 RX ORDER — MORPHINE SULFATE 50 MG/1
2 CAPSULE, EXTENDED RELEASE ORAL ONCE
Qty: 0 | Refills: 0 | Status: DISCONTINUED | OUTPATIENT
Start: 2018-06-23 | End: 2018-06-23

## 2018-06-23 RX ORDER — MORPHINE SULFATE 50 MG/1
2 CAPSULE, EXTENDED RELEASE ORAL EVERY 6 HOURS
Qty: 0 | Refills: 0 | Status: DISCONTINUED | OUTPATIENT
Start: 2018-06-23 | End: 2018-06-23

## 2018-06-23 RX ORDER — METOPROLOL TARTRATE 50 MG
25 TABLET ORAL AT BEDTIME
Qty: 0 | Refills: 0 | Status: DISCONTINUED | OUTPATIENT
Start: 2018-06-23 | End: 2018-06-23

## 2018-06-23 RX ORDER — METRONIDAZOLE 500 MG
500 TABLET ORAL EVERY 8 HOURS
Qty: 0 | Refills: 0 | Status: DISCONTINUED | OUTPATIENT
Start: 2018-06-24 | End: 2018-06-25

## 2018-06-23 RX ORDER — PIPERACILLIN AND TAZOBACTAM 4; .5 G/20ML; G/20ML
3.38 INJECTION, POWDER, LYOPHILIZED, FOR SOLUTION INTRAVENOUS ONCE
Qty: 0 | Refills: 0 | Status: COMPLETED | OUTPATIENT
Start: 2018-06-23 | End: 2018-06-23

## 2018-06-23 RX ORDER — SODIUM FERRIC GLUCONAT/SUCROSE 62.5MG/5ML
125 AMPUL (ML) INTRAVENOUS
Qty: 0 | Refills: 0 | Status: DISCONTINUED | OUTPATIENT
Start: 2018-06-23 | End: 2018-06-25

## 2018-06-23 RX ORDER — POLYETHYLENE GLYCOL 3350 17 G/17G
17 POWDER, FOR SOLUTION ORAL DAILY
Qty: 0 | Refills: 0 | Status: DISCONTINUED | OUTPATIENT
Start: 2018-06-23 | End: 2018-06-24

## 2018-06-23 RX ORDER — METOPROLOL TARTRATE 50 MG
5 TABLET ORAL ONCE
Qty: 0 | Refills: 0 | Status: COMPLETED | OUTPATIENT
Start: 2018-06-23 | End: 2018-06-23

## 2018-06-23 RX ORDER — METOPROLOL TARTRATE 50 MG
25 TABLET ORAL AT BEDTIME
Qty: 0 | Refills: 0 | Status: DISCONTINUED | OUTPATIENT
Start: 2018-06-23 | End: 2018-06-25

## 2018-06-23 RX ORDER — SENNA PLUS 8.6 MG/1
2 TABLET ORAL AT BEDTIME
Qty: 0 | Refills: 0 | Status: DISCONTINUED | OUTPATIENT
Start: 2018-06-23 | End: 2018-06-24

## 2018-06-23 RX ORDER — METRONIDAZOLE 500 MG
TABLET ORAL
Qty: 0 | Refills: 0 | Status: DISCONTINUED | OUTPATIENT
Start: 2018-06-23 | End: 2018-06-25

## 2018-06-23 RX ADMIN — Medication 125 MILLIGRAM(S): at 06:35

## 2018-06-23 RX ADMIN — Medication 125 MILLIGRAM(S): at 23:27

## 2018-06-23 RX ADMIN — CEFEPIME 1000 MILLIGRAM(S): 1 INJECTION, POWDER, FOR SOLUTION INTRAMUSCULAR; INTRAVENOUS at 08:29

## 2018-06-23 RX ADMIN — Medication 25 MILLIGRAM(S): at 22:49

## 2018-06-23 RX ADMIN — PIPERACILLIN AND TAZOBACTAM 200 GRAM(S): 4; .5 INJECTION, POWDER, LYOPHILIZED, FOR SOLUTION INTRAVENOUS at 23:26

## 2018-06-23 RX ADMIN — Medication 5 MILLIGRAM(S): at 06:35

## 2018-06-23 RX ADMIN — CEFEPIME 1000 MILLIGRAM(S): 1 INJECTION, POWDER, FOR SOLUTION INTRAMUSCULAR; INTRAVENOUS at 17:35

## 2018-06-23 RX ADMIN — SENNA PLUS 2 TABLET(S): 8.6 TABLET ORAL at 22:50

## 2018-06-23 RX ADMIN — PANTOPRAZOLE SODIUM 40 MILLIGRAM(S): 20 TABLET, DELAYED RELEASE ORAL at 12:45

## 2018-06-23 RX ADMIN — MAGNESIUM OXIDE 400 MG ORAL TABLET 400 MILLIGRAM(S): 241.3 TABLET ORAL at 12:54

## 2018-06-23 RX ADMIN — SODIUM CHLORIDE 75 MILLILITER(S): 9 INJECTION, SOLUTION INTRAVENOUS at 12:45

## 2018-06-23 RX ADMIN — Medication 100 MILLIGRAM(S): at 22:47

## 2018-06-23 RX ADMIN — MORPHINE SULFATE 4 MILLIGRAM(S): 50 CAPSULE, EXTENDED RELEASE ORAL at 04:52

## 2018-06-23 RX ADMIN — Medication 50 MILLIGRAM(S): at 06:36

## 2018-06-23 RX ADMIN — MORPHINE SULFATE 2 MILLIGRAM(S): 50 CAPSULE, EXTENDED RELEASE ORAL at 20:05

## 2018-06-23 RX ADMIN — Medication 125 MILLIGRAM(S): at 17:35

## 2018-06-23 RX ADMIN — Medication 125 MILLIGRAM(S): at 12:45

## 2018-06-23 RX ADMIN — MORPHINE SULFATE 4 MILLIGRAM(S): 50 CAPSULE, EXTENDED RELEASE ORAL at 06:00

## 2018-06-23 RX ADMIN — Medication 5 MILLIGRAM(S): at 01:45

## 2018-06-23 RX ADMIN — POLYETHYLENE GLYCOL 3350 17 GRAM(S): 17 POWDER, FOR SOLUTION ORAL at 13:47

## 2018-06-23 RX ADMIN — Medication 100 MILLIGRAM(S): at 06:36

## 2018-06-23 RX ADMIN — POLYETHYLENE GLYCOL 3350 17 GRAM(S): 17 POWDER, FOR SOLUTION ORAL at 09:51

## 2018-06-23 RX ADMIN — Medication 100 MILLIGRAM(S): at 22:49

## 2018-06-23 RX ADMIN — Medication 100 MILLIGRAM(S): at 13:47

## 2018-06-23 NOTE — PROGRESS NOTE ADULT - ASSESSMENT
68 y/o F with a PMHx of Merkel cell carcinoma, HLD, HTN, s/p kidney transplant (17 years ago, off immunotherapy), history of cdiff in past, recent hospitalization in May 2018 for sepsis secondary to Klebsiella admitted on 6/20 for evaluation of acute onset of abdominal pain; patient had chemotherapy with cisplatin and etoposide. Notes nausea no vomiting. No other complaints.   1. Patient admitted with abdominal pain, now neutropenic; unclear source of her pain; possibly secondary to constipation? Found to have free air in abdomen, perforated viscus?  - follow up cultures  - serial cbc and monitor temperature   - neutropenic precautions  - will start zosyn with a loading dose  - will add iv flagyl as well  - surgery evaluation  - on po vancomycin to prevent cdiff  - skin lesions do not look infected  - cardiology and nephrology evaluations  2. other issues: Merkel cell carcinoma, HLD, HTN, s/p kidney transplant (17 years ago, off immunotherapy)  - per medicine

## 2018-06-23 NOTE — CHART NOTE - NSCHARTNOTEFT_GEN_A_CORE
Called by nursing for tachycardia. 67F w/PMH of Merkel's cell ca, CKD3, pericardial effusion admitted for pancytopenia, fever and abd pain of unknown etiology. Pt HR in the 140s-150s on tele since this afternoon. AM echo revealed pericardial effusion unchanged from an older study. Pt c/o abd pain and tenderness, but denied CP, SoB, palpitations. Per hospitalist plan for tachycardia, gave extra doses of BB: Toprol XL 25mg x1, Lopressor 5mg IV push x1 so far. HR at time of writing 90s. EKG revealed tachycardia w/o changes from this afternoon's EKG, no ST changes.     Vital Signs Last 24 Hrs  T(C): 36.3 (23 Jun 2018 01:05), Max: 37 (22 Jun 2018 14:16)  T(F): 97.4 (23 Jun 2018 01:05), Max: 98.6 (22 Jun 2018 14:16)  HR: 151 (23 Jun 2018 01:05) (91 - 154)  BP: 128/79 (23 Jun 2018 01:05) (98/62 - 134/86)  BP(mean): --  RR: 18 (23 Jun 2018 01:05) (17 - 18)  SpO2: 94% (23 Jun 2018 01:05) (94% - 99%)    GEN: Pt in bed, in moderate distress  CARDIO: S1S2+, ?extra sound, tachycardic  ABD: Diffusely tender and distended. BS+  EXT: B/L stasis changes and edema    #Tachycardia  - S/P Toprol XL 25mg, Lopressor 5mg IV push x1  - Will continue to monitor vitals  - Cardiology already following Called by nursing for tachycardia. 67F w/PMH of Merkel's cell ca, CKD3, pericardial effusion admitted for pancytopenia, fever and abd pain of unknown etiology. Pt HR in the 140s-150s on tele since this afternoon. AM echo revealed pericardial effusion unchanged from an older study. Pt c/o abd pain and tenderness, but denied CP, SoB, palpitations. Per hospitalist plan for tachycardia, gave extra doses of BB: Toprol XL 25mg x1, Lopressor 5mg IV push x1 so far. HR at time of writing 90s. EKG revealed tachycardia w/o changes from this afternoon's EKG, no ST changes.     Vital Signs Last 24 Hrs  T(C): 36.3 (23 Jun 2018 01:05), Max: 37 (22 Jun 2018 14:16)  T(F): 97.4 (23 Jun 2018 01:05), Max: 98.6 (22 Jun 2018 14:16)  HR: 151 (23 Jun 2018 01:05) (91 - 154)  BP: 128/79 (23 Jun 2018 01:05) (98/62 - 134/86)  BP(mean): --  RR: 18 (23 Jun 2018 01:05) (17 - 18)  SpO2: 94% (23 Jun 2018 01:05) (94% - 99%)    GEN: Pt in bed, in moderate distress  CARDIO: S1S2+, ?extra sound, tachycardic  ABD: Diffusely tender and distended. BS+  EXT: B/L stasis changes and edema    #Tachycardia  - S/P Toprol XL 25mg, Lopressor 5mg IV push x1  - Will continue to monitor vitals  - Cardiology already following    Case discussed w/ Dr Roland PGY-2

## 2018-06-23 NOTE — PROGRESS NOTE ADULT - SUBJECTIVE AND OBJECTIVE BOX
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  Pt reports diffuse metastasis throughout the chest and abdomen, yet does not suffer chronic abdominal pain.  Pt c/o abdominal pain, diffuse throughout the entire abdomen since about 6pm after eating.  +nausea with vomiting x 4.  No diarrhea.  +chronic constipation.  Has a Rx for oxycodone, which she has never taken, until tonight when she took a dose which did not provide relief.  Past surgical hx significant for hernia repair.  Pain is located in the epigastric area and lower abd with vaginal irradiation at times; relieved by morphine. Had a temp of 101 in ED and received Dapto  Pt is off Imuran due to recent chemo; she received Etoposide and Carboplatin 1 week ago at [place]. (21 Jun 2018 10:20)    6/22-  seems short of breath but she states that is because she was having alot of abdominal pain.    states she seem to get sick on wednesday and has progressively gotten worse.     6/23-  continues with abdominal pain and distension. scheduled for CT of the abdomen today.   decreased appetite.  overnight, episodes of sinus tachycardia in the 150s. has been able to maintain a good blood pressure.   Echo showed moderate pericardial effusion which is unchanged.   scheduled for plasma transfusion today.  very uncomfortable.     Allergies    bacitracin (Rash)  Levaquin (Sedation/Somnol; Muscle Pain)  tetracyclines (Other)    Intolerances        MEDICATIONS  (STANDING):  aspirin enteric coated 81 milliGRAM(s) Oral daily  cefepime  Injectable. 1000 milliGRAM(s) IV Push every 12 hours  docusate sodium 100 milliGRAM(s) Oral three times a day  magnesium oxide 400 milliGRAM(s) Oral daily  metoprolol succinate ER 50 milliGRAM(s) Oral daily  pantoprazole  Injectable 40 milliGRAM(s) IV Push daily  predniSONE   Tablet 5 milliGRAM(s) Oral daily  sodium chloride 0.9% Bolus 500 milliLiter(s) IV Bolus every 15 minutes  vancomycin    Solution 125 milliGRAM(s) Oral every 6 hours    MEDICATIONS  (PRN):  acetaminophen   Tablet 650 milliGRAM(s) Oral every 6 hours PRN pain fever  aluminum hydroxide/magnesium hydroxide/simethicone Suspension 30 milliLiter(s) Oral every 4 hours PRN Dyspepsia  morphine  - Injectable 4 milliGRAM(s) IV Push every 3 hours PRN Moderate Pain (4 - 6)  ondansetron Injectable 4 milliGRAM(s) IV Push every 6 hours PRN Nausea    REVIEW OF SYSTEMS:    RESPIRATORY: No cough, wheezing, hemoptysis; No shortness of breath  CARDIOVASCULAR: No chest pain or palpitations  All other review of systems is negative unless indicated above      PHYSICAL EXAM:  Daily     Daily   Vital Signs Last 24 Hrs  T(C): 36.4 (23 Jun 2018 04:30), Max: 37 (22 Jun 2018 14:16)  T(F): 97.6 (23 Jun 2018 04:30), Max: 98.6 (22 Jun 2018 14:16)  HR: 62 (23 Jun 2018 04:30) (62 - 154)  BP: 113/55 (23 Jun 2018 04:30) (98/62 - 134/86)  BP(mean): --  RR: 17 (23 Jun 2018 04:30) (17 - 18)  SpO2: 98% (23 Jun 2018 04:30) (94% - 99%)    Constitutional: NAD, awake and alert, well-developed  HEENT: PERR, EOMI, Normal Hearing, MMM  Neck: Soft and supple, No LAD, No JVD  Respiratory: Breath sounds are clear bilaterally, No wheezing, rales or rhonchi  Cardiovascular: S1 and S2, regular rate and rhythm, no Murmurs, gallops or rubs  Gastrointestinal: distended, tender.  Extremities: No peripheral edema  Vascular: 2+ peripheral pulses  Neurological: A/O x 3, no focal deficits  Musculoskeletal: 5/5 strength b/l upper and lower extremities  Skin: No rashes    LABS: All Labs Reviewed:                        11.0   0.69  )-----------( 10       ( 23 Jun 2018 06:11 )             32.8     06-23    137  |  108  |  57<H>  ----------------------------<  173<H>  4.8   |  17<L>  |  2.21<H>    Ca    8.8      23 Jun 2018 06:11  Phos  5.9     06-23  Mg     2.2     06-23      ECHO:    < from: Transthoracic Echocardiogram (06.22.18 @ 08:09) >   EXAM:  2D ECHOCARDIOGRAM AD         PROCEDURE DATE:  06/22/2018        INTERPRETATION:  Transthoracic Echocardiography Report (TTE)     Demographics     Patient name       JUICE CHU       Age           67 year(s)     Med Rec #          [id number]            Gender        Female     Account #          [id number]              Date of Birth 1950     Interpreting       Veronica Black, Room Number   0010   Physician          MD     Referring          ANAND ALBERTO MD   Sonographer   Jonathan Vargas,   Physician                                             RDCS     Date of study      06/22/2018 07:55 AM     Height             65.98 in             Weight        120.15 pounds    Type of Study:     TTE procedure: 2D echocardiogram AD     BP: 112/65 mmHg     Study Location: 1NTechnical Quality: Limited study    Indications   1) I31.3 - Pericardial effusion noninflammatory    M-Mode Measurements (cm)     LVEDd: 5.42 cm                       LVESd: 4.67 cm   IVSEd: 1.15 cm   LVPWd:1.15 cm     Findings     Left Ventricle   Limited study to asses pericardial effusion.   Estimated left ventricular ejection fraction is 40 %, function is   decreased since prior study 05/08/2018   Prominent D shape of the interventricular septum in both systole and   diastole suggestive of RV pressure and volume overload.     Pericardial Effusion   A moderate-sized pericardial effusion is present.   No Significant changes since prior study.     Pleural Effusion   Pleural effusion cannot be ruledout.     Miscellaneous   IVC is collapsing with inspiration.     Impression     Summary     Limited study to asses pericardial effusion.   Estimated left ventricular ejection fraction is 40 %, function is   decreased since prior study 05/08/2018.   Prominent D shape of the interventricular septum in both systole and   diastole suggestive of RV pressure and volume overload.     Signature     ----------------------------------------------------------------   Electronically signed by Veronica Black MD(Interpreting   physician) on 06/22/2018 02:08 PM    < end of copied text >        TELEMETRY/EKG: Sinus Tachycardia    RADIOLOGY: < from: CT Abdomen and Pelvis No Cont (06.21.18 @ 00:36) >  EXAM:  CT ABDOMEN AND PELVIS                            PROCEDURE DATE:  06/21/2018          INTERPRETATION:  CT ABDOMEN AND PELVIS WITHOUT CONTRAST    INDICATION: Abdominal pain.    TECHNIQUE: Abdominopelvic CT without intravenous contrast.    COMPARISON 5/7/2018.    FINDINGS:    Evaluation of the solid organs and vasculature limited in the absence of   intravenous contrast.     Lower Thorax: Small right pleural effusion with adjacent probable   atelectasis although consolidation is possible, slightly increased from   prior exam. Cardiomegaly with moderate pericardial effusion which is   grossly stable.        Liver: Innumerable hypodense cysts throughout the liver.  Biliary: No dilatation. Cholelithiasis.  Spleen: No suspicious lesions.   Pancreas: No inflammatory changes or ductal dilatation.      Adrenals: Normal.      Kidneys: No hydronephrosis or solid mass. Polycystic disease. Stable   appearance of left lower quadrant renal transplant with possible vascular   aneurysm.  Vessels: Normal caliber.        GI tract: No evidence of small bowel obstruction. No wall thickening or   inflammatory changes.        Peritoneum/retroperitoneum and mesentery: No free air. No organized fluid   collection. No adenopathy. Small volume ascites.    Pelvic organs/Bladder: No pelvic masses. Bladder is normal.        Abdominal wall: Unremarkable.  Bones and soft tissues: No destructive lesion. Mild anasarca.   Degenerative changes of the spine. Grade 1 anterolisthesis of L4.    IMPRESSION:  No evidence of obstruction or definite evidence of bowel inflammation.    Small right pleural effusion with moderate pericardial effusion. Small   volume ascites and anasarca.    No other interval change from prior exam. Polycystic kidney and liver   disease.    < end of copied text >

## 2018-06-23 NOTE — PROGRESS NOTE ADULT - ASSESSMENT
*Pancytopenia most likely due to chemo  -S/P 2 units of PRBC (6/22)  -transfuse 1 unit of platelets today  -plt transfusion if <10K or signs of any bleeding  -Neutropenic precautions  -[facility] Evaluation - ?neulasta    *Fever and Abdominal Pain 2ndry to Perforation and Peritonitis  -CT abd  no contrast - NAD  -Stool softeners, Miralax, Enema  -GI and surgery consult appreciated  -comfort care  -ID consult appreciated  -neutropenic precautions  -S/P cefepime 1 gram q 12 hours and po vancomycin to prevent cdif  -switche to Zosyn and Flagyl    *Tachycardia 2ndry to anemia and possible infection  -continue tele  -increase Metoprolol 50 Am and 25 PM, if BP permits   -cardio consult appreciated     *Moderate pericardial effusion, Ascites and ansarca on CT 2ndry to Chronic Systolic CHF  -hold Diuretic due to low BP and ARF  -ECHO Estimated left ventricular ejection fraction is 40 %, function is decreased since prior study 05/08/2018  -cardio consult appreciated  - ?pericardiocentesis procedure.     *Urinary Retention  -Manning Placed  -TOV once stable    *Merkel cell carcinoma.  -On Chemo: Carboplatin and Etoposide   -patients outside [facility] Dr Gutierrez [phone]    *CKD stage 3 - Cr near baseline 1.5 - 1.6 while on lasix  -h/o Renal transplant - not on any immunosupression due to acitve malignancy  -Renal Fx is stable   -Continue prednisone  -Avoid nephrotoxic agents   -if we decide on CT w Dye then would give gentle IV saline preprocedure  -renal consult appreciated    *HTN  -Monitor BP, stable   -Hold amlodipine   -continue Metoprolol    *DVT PPXs: Cannot give chemical Anticoagulation due to very low platelet.    *dispostion:    addendum:  Discussed with patient and  and at this time patient does not want the surgery.  They understands the risk perioperatively and postoperatively.  She is neutropenic, plt of 10, renal failure with kidney transplant, CHF with moderate pericardial effusion. she is not a candidate for surgery. Patient and family agree to comfort care.  DNR/DNI was signed. Discussed management with Residents, Surgery, Dr dale and Hospitalist Dr Flores    Advanced Care Planning 35 minutes.  Discussion with patient and family regarding advance directives. At this time they would like to be DNR/DNI. Form signed  Total Critical care time 70 minutes

## 2018-06-23 NOTE — PROGRESS NOTE ADULT - SUBJECTIVE AND OBJECTIVE BOX
Patient is a 67y old  Female who presents with a chief complaint of Sent in by Dr. carole Roca for fever (05 May 2018 03:18)    Date of service: 06-23-18 @ 18:08  Patient still with severe abdominal pain, seen earlier in the day  ROS: no fever or chills; denies dizziness, no HA, no SOB or cough,  no diarrhea or constipation; no dysuria, no urinary frequency, no legs pain, no rashes    MEDICATIONS  (STANDING):  aspirin enteric coated 81 milliGRAM(s) Oral daily  docusate sodium 100 milliGRAM(s) Oral three times a day  folic acid 1 milliGRAM(s),dextrose 50 milliLiter(s) 1 milliGRAM(s) IV Intermittent daily  magnesium oxide 400 milliGRAM(s) Oral daily  metoprolol succinate ER 50 milliGRAM(s) Oral daily  metoprolol succinate ER 25 milliGRAM(s) Oral at bedtime  pantoprazole  Injectable 40 milliGRAM(s) IV Push daily  piperacillin/tazobactam IVPB. 3.375 Gram(s) IV Intermittent once  piperacillin/tazobactam IVPB. 3.375 Gram(s) IV Intermittent every 12 hours  polyethylene glycol 3350 17 Gram(s) Oral daily  predniSONE   Tablet 5 milliGRAM(s) Oral daily  senna 2 Tablet(s) Oral at bedtime  sodium chloride 0.45% 1000 milliLiter(s) (75 mL/Hr) IV Continuous <Continuous>  sodium chloride 0.9% Bolus 500 milliLiter(s) IV Bolus every 15 minutes  sodium ferric gluconate complex IVPB 125 milliGRAM(s) IV Intermittent <User Schedule>  vancomycin    Solution 125 milliGRAM(s) Oral every 6 hours    MEDICATIONS  (PRN):  acetaminophen   Tablet 650 milliGRAM(s) Oral every 6 hours PRN pain fever  aluminum hydroxide/magnesium hydroxide/simethicone Suspension 30 milliLiter(s) Oral every 4 hours PRN Dyspepsia  ondansetron Injectable 4 milliGRAM(s) IV Push every 6 hours PRN Nausea      Vital Signs Last 24 Hrs  T(C): 36.8 (23 Jun 2018 17:21), Max: 36.8 (23 Jun 2018 17:21)  T(F): 98.3 (23 Jun 2018 17:21), Max: 98.3 (23 Jun 2018 17:21)  HR: 157 (23 Jun 2018 17:21) (62 - 157)  BP: 107/74 (23 Jun 2018 17:21) (107/74 - 134/86)  BP(mean): --  RR: 18 (23 Jun 2018 17:21) (17 - 18)  SpO2: 90% (23 Jun 2018 17:21) (90% - 99%)    Physical Exam:          Constitutional: frail looking  HEENT: NC/AT, EOMI, PERRLA  Neck: supple  Back: no tenderness  Respiratory: clear  Cardiovascular: S1S2 regular, no murmurs  Abdomen: soft, difusely tender, distended, positive BS  Genitourinary: deferred  Rectal: deferred  Musculoskeletal: no muscle tenderness, no joint swelling or tenderness  Extremities: no pedal edema  Neurological: AxOx3, moving all extremities, no focal deficits  Skin:multiple skin lesions on extremities; dry and  crusting; no pus visible      Labs:                        11.0   0.69  )-----------( 10       ( 23 Jun 2018 06:11 )             32.8     06-23    137  |  108  |  57<H>  ----------------------------<  173<H>  4.8   |  17<L>  |  2.21<H>    Ca    8.8      23 Jun 2018 06:11  Phos  5.9     06-23  Mg     2.2     06-23    TPro  5.4<L>  /  Alb  2.0<L>  /  TBili  0.9  /  DBili  0.4<H>  /  AST  27  /  ALT  30  /  AlkPhos  65  06-23           Cultures:       Culture - Urine (collected 06-21-18 @ 01:10)  Source: .Urine Clean Catch (Midstream)  Final Report (06-22-18 @ 11:08):    No growth    Culture - Blood (collected 06-21-18 @ 01:01)  Source: .Blood None  Preliminary Report (06-22-18 @ 08:01):    No growth to date.    Culture - Blood (collected 06-21-18 @ 00:53)  Source: .Blood None  Preliminary Report (06-22-18 @ 08:01):    No growth to date.    < from: CT Abdomen and Pelvis w/ Oral Cont (06.23.18 @ 16:50) >    EXAM:  CT ABDOMEN AND PELVIS OC                            PROCEDURE DATE:  06/23/2018          INTERPRETATION:    CT OF THE ABDOMEN AND PELVIS WITHOUT IV CONTRAST     CLINICAL INDICATION: generalized diffuse abd pain    TECHNIQUE: A CT scan of theabdomen and pelvis was performed from the   domes of the diaphragm to the symphysis pubis with oral contrast only.    Sagittal and coronal reformatted images were provided.    COMPARISON: 6/21/2018    FINDINGS:   Note limited evaluation of the solid organs and vasculature in the   absence of intravenous contrast.    LOWER THORAX: Small to moderate right pleural effusion and small left   pleural effusion with underlying compressive atelectasis is markedly   worsened since prior exam. Moderate pericardial effusion is unchanged..    LIVER:  Innumerable cysts in the liver are again identified..  GALLBLADDER: Small thin linear calculus in a distended gallbladder.  BILIARY TREE: Unremarkable.  PANCREAS: Unremarkable.  SPLEEN: Unremarkable.  ADRENALS: Unremarkable.  KIDNEYS/URETERS: Diffusely enlarged kidneys with innumerable cysts in the   bilateral kidneys, compatible with known polycystic kidney disease.   Numerous calculi in the bilateral native kidneys. Renal transplant in the   left pelvisis present..    BOWEL: Diffusely dilated small bowel loops without a distinct transition   point. Oral contrast passes into the large bowel. These findings likely   reflect an ileus, however an obstruction cannot be excluded. No definite   CT evidence of diverticulitis.    PERITONEUM/RETROPERITONEUM: Moderate amount of scattered free air within   the upper and mid abdomen, new since prior exam, of unknown origin. Small   amount of free fluid in the perihepatic region is unchanged. Small new   focus of ascites in the left lateral mid abdomen. Moderate free fluid in   the pelvis.    BLADDER: Manning catheter within the bladder.  REPRODUCTIVE ORGANS: Unremarkable. Vaginal pessary is in place.    VASCULATURE: Within normal limits.  ABDOMINAL WALL:Unremarkable  BONES: No aggressive osseous lesion.    IMPRESSION:  Moderate amount of scattered free air within the upper and mid abdomen,   new since prior exam, of unknown origin. Small amount of free fluid in   the perihepatic region is unchanged.Small new focus of ascites in the   left lateral mid abdomen. Moderate free fluid in the pelvis.  Diffusely   dilated small bowel loops without a distinct transition point. Oral   contrast passes into the large bowel. These findings likely reflect an  ileus, however an obstruction cannot be excluded. No definite CT evidence   of diverticulitis.    Small to moderate right pleural effusion and small left pleural effusion   with underlying compressive atelectasis is markedly worsened since prior   exam. Moderate pericardial effusion is unchanged..    Rest of the findings as above.    < end of copied text >          < from: CT Abdomen and Pelvis No Cont (06.21.18 @ 00:36) >    EXAM:  CT ABDOMEN AND PELVIS                            PROCEDURE DATE:  06/21/2018          INTERPRETATION:  CT ABDOMEN AND PELVIS WITHOUT CONTRAST    INDICATION: Abdominal pain.    TECHNIQUE: Abdominopelvic CT without intravenous contrast.    COMPARISON 5/7/2018.    FINDINGS:    Evaluation of the solid organs and vasculature limited in the absence of   intravenous contrast.     Lower Thorax: Small right pleural effusion with adjacent probable   atelectasis although consolidation is possible, slightly increased from   prior exam. Cardiomegaly with moderate pericardial effusion which is   grossly stable.        Liver: Innumerable hypodense cysts throughout the liver.  Biliary: No dilatation. Cholelithiasis.  Spleen: No suspicious lesions.   Pancreas: No inflammatory changes or ductal dilatation.      Adrenals: Normal.      Kidneys: No hydronephrosis or solid mass. Polycystic disease. Stable   appearance of left lower quadrant renal transplant with possible vascular   aneurysm.  Vessels: Normal caliber.        GI tract: No evidence of small bowel obstruction. No wall thickening or   inflammatory changes.        Peritoneum/retroperitoneum and mesentery: No free air. No organized fluid   collection. No adenopathy. Small volume ascites.    Pelvic organs/Bladder: No pelvic masses. Bladder is normal.        Abdominal wall: Unremarkable.  Bones and soft tissues: No destructive lesion. Mild anasarca.   Degenerative changes of the spine. Grade 1 anterolisthesis of L4.    IMPRESSION:  No evidence of obstruction or definite evidence of bowel inflammation.    Small right pleural effusion with moderate pericardial effusion. Small   volume ascites and anasarca.    No other interval change from prior exam. Polycystic kidney and liver   disease.        < end of copied text >                  Radiology: all available radiological tests reviewed    Advanced directives addressed: full resuscitation

## 2018-06-23 NOTE — PROGRESS NOTE ADULT - SUBJECTIVE AND OBJECTIVE BOX
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  Pt reports diffuse metastasis throughout the chest and abdomen, yet does not suffer chronic abdominal pain.  Pt c/o abdominal pain, diffuse throughout the entire abdomen since about 6pm after eating.  +nausea with vomiting x 4.  No diarrhea.  +chronic constipation.  Has a Rx for oxycodone, which she has never taken, until tonight when she took a dose which did not provide relief.  Past surgical hx significant for hernia repair.  Pain is located in the epigastric area and lower abd with vaginal irradiation at times; relieved by morphine. Had a temp of 101 in ED and received Dapto  Pt is off Imuran due to recent chemo; she received Etoposide and Carboplatin 1 week ago at [place]. (21 Jun 2018 10:20)    6/22: louann was transferred yesterday from [facility] for tachycardia.  She continues to have generalized abdominal pain.  this morning labs showed pancytopenia; Called Dr Francois's offce [phone] and spoke with the nurse who will fax over the information  6/23:  continues to have abodminal pain; last bowel movement was monday. Seen by ID and placed on ABX and neutropenic precautions    Allergies:  bacitracin (Rash)  Levaquin (Sedation/Somnol; Muscle Pain)  tetracyclines (Other)    REVIEW OF SYSTEMS:  See HPI. All other review of systems is negative unless indicated above.     OBJECTIVE  Physical Exam:  Vital Signs Last 24 Hrs  T(C): 36.4 (23 Jun 2018 10:47), Max: 37 (22 Jun 2018 14:16)  T(F): 97.5 (23 Jun 2018 10:47), Max: 98.6 (22 Jun 2018 14:16)  HR: 92 (23 Jun 2018 10:47) (62 - 154)  BP: 112/54 (23 Jun 2018 10:47) (109/51 - 134/86)  BP(mean): --  RR: 18 (23 Jun 2018 10:47) (17 - 18)  SpO2: 98% (23 Jun 2018 10:47) (94% - 99%)    Constitutional: NAD, awake and alert, well-developed  Neurological: AAO x 3, no focal deficits  HEENT: PERRLA, EOMI, MMM  Neck: Soft and supple, No LAD, No JVD  Respiratory: Breath sounds are clear bilaterally, No wheezing, rales or rhonchi  Cardiovascular: S1 and S2, regular rate and rhythm; no Murmurs, gallops or rubs  Gastrointestinal: Bowel Sounds present, soft, nontender, nondistended, no guarding, no rebound tenderness  Back: No CVA tenderness   Extremities: No peripheral edema  Vascular: 2+ peripheral pulses  Musculoskeletal: 5/5 strength b/l upper and lower extremities  Skin: No rashes  Breast: Deferred  Rectal: Deferred    MEDICATIONS  (STANDING):  aspirin enteric coated 81 milliGRAM(s) Oral daily  cefepime  Injectable. 1000 milliGRAM(s) IV Push every 12 hours  docusate sodium 100 milliGRAM(s) Oral three times a day  furosemide   Injectable 40 milliGRAM(s) IV Push once  magnesium oxide 400 milliGRAM(s) Oral daily  metoprolol succinate ER 25 milliGRAM(s) Oral daily  pantoprazole  Injectable 40 milliGRAM(s) IV Push daily  predniSONE   Tablet 5 milliGRAM(s) Oral daily  sodium chloride 0.9% Bolus 500 milliLiter(s) IV Bolus every 15 minutes  vancomycin    Solution 125 milliGRAM(s) Oral every 6 hours    Lab Results:  CBC  CBC Full  -  ( 23 Jun 2018 06:11 )  WBC Count : 0.69 K/uL  Hemoglobin : 11.0 g/dL  Hematocrit : 32.8 %  Platelet Count - Automated : 10 K/uL  Mean Cell Volume : 93.2 fl  Mean Cell Hemoglobin : 31.3 pg  Mean Cell Hemoglobin Concentration : 33.5 gm/dL  Auto Neutrophil # : 0.30 K/uL  Auto Lymphocyte # : 0.22 K/uL  Auto Monocyte # : 0.14 K/uL  Auto Eosinophil # : 0.00 K/uL  Auto Basophil # : 0.00 K/uL  Auto Neutrophil % : 44.0 %  Auto Lymphocyte % : 32.0 %  Auto Monocyte % : 20.0 %  Auto Eosinophil % : 0.0 %  Auto Basophil % : 0.0 %    .		Differential:	[] Automated		[] Manual  Chemistry                        11.0   0.69  )-----------( 10       ( 23 Jun 2018 06:11 )             32.8     06-23    137  |  108  |  57<H>  ----------------------------<  173<H>  4.8   |  17<L>  |  2.21<H>    Ca    8.8      23 Jun 2018 06:11  Phos  5.9     06-23  Mg     2.2     06-23    MICROBIOLOGY/CULTURES:  Culture Results:   No growth (06-21 @ 01:10)  Culture Results:   No growth to date. (06-21 @ 01:01)  Culture Results:   No growth to date. (06-21 @ 00:53)      RADIOLOGY RESULTS:      < from: Transthoracic Echocardiogram (06.22.18 @ 08:09) >     Impression     Summary     Limited study to asses pericardial effusion.   Estimated left ventricular ejection fraction is 40 %, function is   decreased since prior study 05/08/2018.   Prominent D shape of the interventricular septum in both systole and   diastole suggestive of RV pressure and volume overload.     Signature     ----------------------------------------------------------------   Electronically signed by Veronica Black MD(Interpreting   physician) on 06/22/2018 02:08 PM   ----------------------------------------------------------------    < end of copied text >    RADIOLOGY/EKG:    < from: Xray Abdomen 2 View PORTABLE -Urgent (06.21.18 @ 11:00) >    IMPRESSION:    Nonobstructive bowel gas pattern. Moderate ascending colonic fecal   retention.    < end of copied text >    < from: Xray Chest 1 View AP/PA. (06.20.18 @ 23:41) >    FINDINGS/  IMPRESSION:          Pulmonary vascular congestion and tiny bilateral pleural effusions are   present. Cardiomegaly.      < end of copied text >    < from: CT Abdomen and Pelvis No Cont (06.21.18 @ 00:36) >    IMPRESSION:  No evidence of obstruction or definite evidence of bowel inflammation.    Small right pleural effusion with moderate pericardial effusion. Small   volume ascites and anasarca.    No other interval change from prior exam. Polycystic kidney and liver   disease.    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  Pt reports diffuse metastasis throughout the chest and abdomen, yet does not suffer chronic abdominal pain.  Pt c/o abdominal pain, diffuse throughout the entire abdomen since about 6pm after eating.  +nausea with vomiting x 4.  No diarrhea.  +chronic constipation.  Has a Rx for oxycodone, which she has never taken, until tonight when she took a dose which did not provide relief.  Past surgical hx significant for hernia repair.  Pain is located in the epigastric area and lower abd with vaginal irradiation at times; relieved by morphine. Had a temp of 101 in ED and received Dapto  Pt is off Imuran due to recent chemo; she received Etoposide and Carboplatin 1 week ago at [place]. (21 Jun 2018 10:20)    6/22: louann was transferred yesterday from [facility] for tachycardia.  She continues to have generalized abdominal pain.  this morning labs showed pancytopenia; Called Dr Francois's offce [phone] and spoke with the nurse who will fax over the information  6/23:  continues to have abodminal pain; last bowel movement was monday. Seen by ID and placed on ABX and neutropenic precautions    Allergies:  bacitracin (Rash)  Levaquin (Sedation/Somnol; Muscle Pain)  tetracyclines (Other)    REVIEW OF SYSTEMS:  See HPI. All other review of systems is negative unless indicated above.     OBJECTIVE  Physical Exam:  Vital Signs Last 24 Hrs  T(C): 36.4 (23 Jun 2018 10:47), Max: 37 (22 Jun 2018 14:16)  T(F): 97.5 (23 Jun 2018 10:47), Max: 98.6 (22 Jun 2018 14:16)  HR: 92 (23 Jun 2018 10:47) (62 - 154)  BP: 112/54 (23 Jun 2018 10:47) (109/51 - 134/86)  BP(mean): --  RR: 18 (23 Jun 2018 10:47) (17 - 18)  SpO2: 98% (23 Jun 2018 10:47) (94% - 99%)    Constitutional: NAD, awake and alert, well-developed  Neurological: AAO x 3, no focal deficits  HEENT: PERRLA, EOMI, MMM  Neck: Soft and supple, No LAD, No JVD  Respiratory: Breath sounds are clear bilaterally, No wheezing, rales or rhonchi  Cardiovascular: S1 and S2, regular rate and rhythm; no Murmurs, gallops or rubs  Gastrointestinal: Bowel Sounds present, soft, generalized tenderness, +distended, + guarding, no rebound tenderness  Back: No CVA tenderness   Extremities: No peripheral edema  Vascular: 2+ peripheral pulses  Musculoskeletal: 5/5 strength b/l upper and lower extremities  Skin: No rashes  Breast: Deferred  Rectal: Deferred    MEDICATIONS  (STANDING):  aspirin enteric coated 81 milliGRAM(s) Oral daily  cefepime  Injectable. 1000 milliGRAM(s) IV Push every 12 hours  docusate sodium 100 milliGRAM(s) Oral three times a day  furosemide   Injectable 40 milliGRAM(s) IV Push once  magnesium oxide 400 milliGRAM(s) Oral daily  metoprolol succinate ER 25 milliGRAM(s) Oral daily  pantoprazole  Injectable 40 milliGRAM(s) IV Push daily  predniSONE   Tablet 5 milliGRAM(s) Oral daily  sodium chloride 0.9% Bolus 500 milliLiter(s) IV Bolus every 15 minutes  vancomycin    Solution 125 milliGRAM(s) Oral every 6 hours    Lab Results:  CBC  CBC Full  -  ( 23 Jun 2018 06:11 )  WBC Count : 0.69 K/uL  Hemoglobin : 11.0 g/dL  Hematocrit : 32.8 %  Platelet Count - Automated : 10 K/uL  Mean Cell Volume : 93.2 fl  Mean Cell Hemoglobin : 31.3 pg  Mean Cell Hemoglobin Concentration : 33.5 gm/dL  Auto Neutrophil # : 0.30 K/uL  Auto Lymphocyte # : 0.22 K/uL  Auto Monocyte # : 0.14 K/uL  Auto Eosinophil # : 0.00 K/uL  Auto Basophil # : 0.00 K/uL  Auto Neutrophil % : 44.0 %  Auto Lymphocyte % : 32.0 %  Auto Monocyte % : 20.0 %  Auto Eosinophil % : 0.0 %  Auto Basophil % : 0.0 %    .		Differential:	[] Automated		[] Manual  Chemistry                        11.0   0.69  )-----------( 10       ( 23 Jun 2018 06:11 )             32.8     06-23    137  |  108  |  57<H>  ----------------------------<  173<H>  4.8   |  17<L>  |  2.21<H>    Ca    8.8      23 Jun 2018 06:11  Phos  5.9     06-23  Mg     2.2     06-23    MICROBIOLOGY/CULTURES:  Culture Results:   No growth (06-21 @ 01:10)  Culture Results:   No growth to date. (06-21 @ 01:01)  Culture Results:   No growth to date. (06-21 @ 00:53)      RADIOLOGY RESULTS:      < from: Transthoracic Echocardiogram (06.22.18 @ 08:09) >     Impression     Summary     Limited study to asses pericardial effusion.   Estimated left ventricular ejection fraction is 40 %, function is   decreased since prior study 05/08/2018.   Prominent D shape of the interventricular septum in both systole and   diastole suggestive of RV pressure and volume overload.     Signature     ----------------------------------------------------------------   Electronically signed by Veronica Black MD(Interpreting   physician) on 06/22/2018 02:08 PM   ----------------------------------------------------------------    < end of copied text >    RADIOLOGY/EKG:    < from: Xray Abdomen 2 View PORTABLE -Urgent (06.21.18 @ 11:00) >    IMPRESSION:    Nonobstructive bowel gas pattern. Moderate ascending colonic fecal   retention.    < end of copied text >    < from: Xray Chest 1 View AP/PA. (06.20.18 @ 23:41) >    FINDINGS/  IMPRESSION:          Pulmonary vascular congestion and tiny bilateral pleural effusions are   present. Cardiomegaly.      < end of copied text >    < from: CT Abdomen and Pelvis No Cont (06.21.18 @ 00:36) >    IMPRESSION:  No evidence of obstruction or definite evidence of bowel inflammation.    Small right pleural effusion with moderate pericardial effusion. Small   volume ascites and anasarca.    No other interval change from prior exam. Polycystic kidney and liver   disease.    < end of copied text >

## 2018-06-23 NOTE — CONSULT NOTE ADULT - ASSESSMENT
impression: from an oncologic standpoint there is no intervention at this time  Pancytopenia is related to chemotherapy and nutritional depletion  We'll order IV iron and folic acid in light of deficiencies.  Transfuse platelets if less than 10,000 or evidence of active bleeding  transfuse Packed red blood cells based on hemodynamic /pulmonary status  patient has already had Neulasta there is little role for additional growth factors.  Agree with broad-spectrum antibiotics.  additional nutritional supports if  possible  Patient is scheduled for additional CAT scan and management of the abdominal pain as per GI and surgery.  In light of diffuse cytopenias, compromised renal function, and general condition patient is critically ill and prognosis is guarded  above reviewed with the patient's  and patient; and later with hospitals

## 2018-06-23 NOTE — PROGRESS NOTE ADULT - ASSESSMENT
*Pancytopenia most likely due to chemo  -S/P 2 units of PRBC (6/22)  -transfuse 1 unit of platelets today  -plt transfusion if <10K or signs of any bleeding  -Neutropenic precautions  -[facility] Evaluation - ?neulasta    *Fever and Abdominal Pain unclear of Etiology ?constipation vs metastasis vs infection  -CT abd  no contrast - NAD  -Stool softeners, Miralax, Enema  -GI consult   -If no improvement and constipation is not the cause will need CT abd with po contrast - patient will be at high risk for contrast nephropathy; Patient and family understand the risks  -ID consult appreciated  -neutropenic precautions  -continue cefepime 1 gram q 12 hours  -continue po vancomycin to prevent cdif    *Tachycardia 2ndry to anemia and possible infection  -continue tele  -increase Metoprolol 50 Am and 25 PM, if BP permits   -cardio consult appreciated     *Moderate pericardial effusion, Ascites and ansarca on CT 2ndry to Chronic Systolic CHF  -hold Diuretic due to low BP and ARF  -ECHO Estimated left ventricular ejection fraction is 40 %, function is decreased since prior study 05/08/2018  -cardio consult appreciated  - ?pericardiocentesis procedure.     *Urinary Retention  -[place] Placed  -TOV once stable    *Merkel cell carcinoma.  -On Chemo: Carboplatin and Etoposide   -patients outside [facility] Dr Gutierrez [phone]    *CKD stage 3 - Cr near baseline 1.5 - 1.6 while on lasix  -h/o Renal transplant - not on any immunosupression due to acitve malignancy  -Renal Fx is stable   -Continue prednisone  -Avoid nephrotoxic agents   -if we decide on CT w Dye then would give gentle IV saline preprocedure  -renal consult appreciated    *HTN  -Monitor BP, stable   -Hold amlodipine   -continue Metoprolol    *DVT PPXs: Cannot give chemical Anticoagulation due to very low platelet.

## 2018-06-23 NOTE — PROGRESS NOTE ADULT - ASSESSMENT
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    CKD 3 /REJI   -renal transplant - not on any immunosupression due to acitve malignancy    -continue prednisone for minimal therapy    -run of IVF with REJI, acidosis, monitor volume status closely. Bicarbonate based IVF   -urine retention - garcia - TOV when stable clinically      abdominal pain  -Suggested GI/Surgical eval  -If contrast is required, would have no renal issue based on risk benefit. Patient would be at significant risk of Reji and possibly requiring HD with contrast but if GI issues are critical/concerning and IV contrast deemed necessary then little option remains  -D/c above with patient and  at length, they agree and await further discussion with GI vs surgery regarding plans    HTN  - Monitor BP, stable   - c/w metoprolol for tachycardia - keep sbp > 110    Pancytopenia likely due to chemo    - heme onc     - product per they're service       low threshold to transfer to higher level of care     d/w family at bedside at length   d/w dr Ann at length

## 2018-06-23 NOTE — PROGRESS NOTE ADULT - SUBJECTIVE AND OBJECTIVE BOX
Decision was made to make patient comfort care/ DNR, no surgery, Surgery would be too risky. Poor prognosis is very poor based on her metastatic merkel cell carcinoma. Likely patient perforated bowel  showed free air on CT scan. I tried to get in touch with DR Meghna Mendosa (oncologist at [facility]), I talked to DR Mccarthy (on call surgeon) who would talk to Joan. Family decided to go comfort care for patient.

## 2018-06-23 NOTE — PROGRESS NOTE ADULT - SUBJECTIVE AND OBJECTIVE BOX
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  Pt reports diffuse metastasis throughout the chest and abdomen, yet does not suffer chronic abdominal pain.  Pt c/o abdominal pain, diffuse throughout the entire abdomen since about 6pm after eating.  +nausea with vomiting x 4.  No diarrhea.  +chronic constipation.  Has a Rx for oxycodone, which she has never taken, until tonight when she took a dose which did not provide relief.  Past surgical hx significant for hernia repair.  Pain is located in the epigastric area and lower abd with vaginal irradiation at times; relieved by morphine. Had a temp of 101 in ED and received Dapto  Pt is off Imuran due to recent chemo; she received Etoposide and Carboplatin 1 week ago at [place]. (21 Jun 2018 10:20)    6/22: louann was transferred yesterday from [facility] for tachycardia.  She continues to have generalized abdominal pain.  this morning labs showed pancytopenia; Called Dr Francois's offce [phone] and spoke with the nurse who will fax over the information  6/23:  continues to have abodminal pain; last bowel movement was monday. Seen by ID and placed on ABX and neutropenic precautions    addendum:  Patient was seen and examine by me. Called by Dr Dickinson regarding Air in the CT abdomen.    Discussed with patient and  and at this time patient does not want the surgery.  They understands the risk perioperatively and postoperatively.  She is neutropenic, plt of 10, renal failure with kidney transplant, CHF with moderate pericardial effusion. she is not a candidate for surgery. Patient and family agree to comfort care.  DNR/DNI was signed      Allergies:  bacitracin (Rash)  Levaquin (Sedation/Somnol; Muscle Pain)  tetracyclines (Other)    REVIEW OF SYSTEMS:  See HPI. All other review of systems is negative unless indicated above.     OBJECTIVE  Physical Exam:  Vital Signs Last 24 Hrs  T(C): 36.4 (23 Jun 2018 10:47), Max: 37 (22 Jun 2018 14:16)  T(F): 97.5 (23 Jun 2018 10:47), Max: 98.6 (22 Jun 2018 14:16)  HR: 92 (23 Jun 2018 10:47) (62 - 154)  BP: 112/54 (23 Jun 2018 10:47) (109/51 - 134/86)  BP(mean): --  RR: 18 (23 Jun 2018 10:47) (17 - 18)  SpO2: 98% (23 Jun 2018 10:47) (94% - 99%)    Constitutional: NAD, awake and alert, well-developed  Neurological: AAO x 3, no focal deficits  HEENT: PERRLA, EOMI, MMM  Neck: Soft and supple, No LAD, No JVD  Respiratory: Breath sounds are clear bilaterally, No wheezing, rales or rhonchi  Cardiovascular: S1 and S2, regular rate and rhythm; no Murmurs, gallops or rubs  Gastrointestinal: Bowel Sounds present, soft, generalized tenderness, +distended, + guarding, no rebound tenderness  Back: No CVA tenderness   Extremities: No peripheral edema  Vascular: 2+ peripheral pulses  Musculoskeletal: 5/5 strength b/l upper and lower extremities  Skin: No rashes  Breast: Deferred  Rectal: Deferred    MEDICATIONS  (STANDING):  aspirin enteric coated 81 milliGRAM(s) Oral daily  cefepime  Injectable. 1000 milliGRAM(s) IV Push every 12 hours  docusate sodium 100 milliGRAM(s) Oral three times a day  furosemide   Injectable 40 milliGRAM(s) IV Push once  magnesium oxide 400 milliGRAM(s) Oral daily  metoprolol succinate ER 25 milliGRAM(s) Oral daily  pantoprazole  Injectable 40 milliGRAM(s) IV Push daily  predniSONE   Tablet 5 milliGRAM(s) Oral daily  sodium chloride 0.9% Bolus 500 milliLiter(s) IV Bolus every 15 minutes  vancomycin    Solution 125 milliGRAM(s) Oral every 6 hours    Lab Results:  CBC  CBC Full  -  ( 23 Jun 2018 06:11 )  WBC Count : 0.69 K/uL  Hemoglobin : 11.0 g/dL  Hematocrit : 32.8 %  Platelet Count - Automated : 10 K/uL  Mean Cell Volume : 93.2 fl  Mean Cell Hemoglobin : 31.3 pg  Mean Cell Hemoglobin Concentration : 33.5 gm/dL  Auto Neutrophil # : 0.30 K/uL  Auto Lymphocyte # : 0.22 K/uL  Auto Monocyte # : 0.14 K/uL  Auto Eosinophil # : 0.00 K/uL  Auto Basophil # : 0.00 K/uL  Auto Neutrophil % : 44.0 %  Auto Lymphocyte % : 32.0 %  Auto Monocyte % : 20.0 %  Auto Eosinophil % : 0.0 %  Auto Basophil % : 0.0 %    .		Differential:	[] Automated		[] Manual  Chemistry                        11.0   0.69  )-----------( 10       ( 23 Jun 2018 06:11 )             32.8     06-23    137  |  108  |  57<H>  ----------------------------<  173<H>  4.8   |  17<L>  |  2.21<H>    Ca    8.8      23 Jun 2018 06:11  Phos  5.9     06-23  Mg     2.2     06-23    MICROBIOLOGY/CULTURES:  Culture Results:   No growth (06-21 @ 01:10)  Culture Results:   No growth to date. (06-21 @ 01:01)  Culture Results:   No growth to date. (06-21 @ 00:53)      RADIOLOGY RESULTS:      < from: Transthoracic Echocardiogram (06.22.18 @ 08:09) >     Impression     Summary     Limited study to asses pericardial effusion.   Estimated left ventricular ejection fraction is 40 %, function is   decreased since prior study 05/08/2018.   Prominent D shape of the interventricular septum in both systole and   diastole suggestive of RV pressure and volume overload.     Signature     ----------------------------------------------------------------   Electronically signed by Veronica Black MD(Interpreting   physician) on 06/22/2018 02:08 PM   ----------------------------------------------------------------    < end of copied text >    RADIOLOGY/EKG:    < from: Xray Abdomen 2 View PORTABLE -Urgent (06.21.18 @ 11:00) >    IMPRESSION:    Nonobstructive bowel gas pattern. Moderate ascending colonic fecal   retention.    < end of copied text >    < from: Xray Chest 1 View AP/PA. (06.20.18 @ 23:41) >    FINDINGS/  IMPRESSION:          Pulmonary vascular congestion and tiny bilateral pleural effusions are   present. Cardiomegaly.      < end of copied text >    < from: CT Abdomen and Pelvis No Cont (06.21.18 @ 00:36) >    IMPRESSION:  No evidence of obstruction or definite evidence of bowel inflammation.    Small right pleural effusion with moderate pericardial effusion. Small   volume ascites and anasarca.    No other interval change from prior exam. Polycystic kidney and liver   disease.    < end of copied text >

## 2018-06-23 NOTE — CONSULT NOTE ADULT - SUBJECTIVE AND OBJECTIVE BOX
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 about 10 days ago, hx MRSA skin infection and PCKD, hx renal transplant 17 yr ago   presented with diffuse abd pain (upper to lower abdomen) associated with nausea, vomiting and fever at onset  now with abd pain and distension  last bm was on last Tuesday-brown formed stool  no flatus or bm since Tuesday  took pain med on adm  no hematemesis      PAST MEDICAL & SURGICAL HISTORY:  MVP (mitral valve prolapse)  Raynaud's disease  Squamous cell carcinoma: multiple extensive lesions of torso-all extremities-face/scalp  Hypertension  Hyperlipidemia  Polycystic kidney disease  Fibroid uterus  H/O lumpectomy: left  History of incisional hernia repair  History of kidney transplant: left  History of Mohs surgery for squamous cell carcinoma of skin: recent left arm procedure-multiple Moh&#x27;s procedure in the past      Allergies    bacitracin (Rash)  Levaquin (Sedation/Somnol; Muscle Pain)  tetracyclines (Other)    Intolerances        MEDICATIONS  (STANDING):  aspirin enteric coated 81 milliGRAM(s) Oral daily  cefepime  Injectable. 1000 milliGRAM(s) IV Push every 12 hours  docusate sodium 100 milliGRAM(s) Oral three times a day  magnesium oxide 400 milliGRAM(s) Oral daily  metoprolol succinate ER 50 milliGRAM(s) Oral daily  metoprolol succinate ER 25 milliGRAM(s) Oral at bedtime  pantoprazole  Injectable 40 milliGRAM(s) IV Push daily  polyethylene glycol 3350 17 Gram(s) Oral daily  predniSONE   Tablet 5 milliGRAM(s) Oral daily  senna 2 Tablet(s) Oral at bedtime  sodium chloride 0.45% 1000 milliLiter(s) (75 mL/Hr) IV Continuous <Continuous>  sodium chloride 0.9% Bolus 500 milliLiter(s) IV Bolus every 15 minutes  vancomycin    Solution 125 milliGRAM(s) Oral every 6 hours    MEDICATIONS  (PRN):  acetaminophen   Tablet 650 milliGRAM(s) Oral every 6 hours PRN pain fever  aluminum hydroxide/magnesium hydroxide/simethicone Suspension 30 milliLiter(s) Oral every 4 hours PRN Dyspepsia  morphine  - Injectable 4 milliGRAM(s) IV Push every 3 hours PRN Moderate Pain (4 - 6)  ondansetron Injectable 4 milliGRAM(s) IV Push every 6 hours PRN Nausea      FAMILY HISTORY:  No pertinent family history in first degree relatives      Social History:    REVIEW OF SYSTEMS      General:	No fever or chills    Skin/Breast: No jaundice or rash   		  ENMT: denies sore throat or thrush    Respiratory and Thorax: Denies dyspnea or cough or shortness of breath  	  Cardiovascular: Denies chest pain or palpitations 	    Gastrointestinal: Denies jaundice or pruritis    Genitourinary: Denies dysuria or hematuria	    Musculoskeletal: Denies muscular pain or swelling	    Neurological: Denies confusion or tremor	    Hematology/Lymphatics: Denies easy bruising or bleeding 	    Endocrine:	Denies polyphagia or polyuria    See above hx otherwise neg for any major organ systems    PHYSICAL EXAM:    Vital Signs Last 24 Hrs  T(C): 36.7 (23 Jun 2018 12:35), Max: 37 (22 Jun 2018 14:16)  T(F): 98.1 (23 Jun 2018 12:35), Max: 98.6 (22 Jun 2018 14:16)  HR: 87 (23 Jun 2018 12:35) (62 - 154)  BP: 115/47 (23 Jun 2018 12:35) (109/51 - 134/86)  BP(mean): --  RR: 18 (23 Jun 2018 12:35) (17 - 18)  SpO2: 97% (23 Jun 2018 12:35) (94% - 99%)    Constitutional: no acute distress    ENMT: NC/AT scl anicteric opm pink no lesions     Neck: supple. No jvd or LN    Respiratory: Clear     Cardiovascular: RRR s1s2     Gastrointestinal: Pos bs , diffusely tender and distended moderately , focal guarding and no rebound  no palpable mass      Back: No CVA tenderness    Extremities: NO cce     Neurological: Alert and oriented x 3     Skin: No rash or jaundice     Date/Time:06-23 @ 06:11    Albumin: --  ALT/SGPT: --  Alk Phos: --  AST/SGOT: --  Bilirubin Direct: --  Bilirubin Total: --  Ca: 8.8  eGFR : 26  eGFR Non-: 22  Lipase: --  Amylase: --  INR: --  PTT: --      06-23    137  |  108  |  57<H>  ----------------------------<  173<H>  4.8   |  17<L>  |  2.21<H>    Ca    8.8      23 Jun 2018 06:11  Phos  5.9     06-23  Mg     2.2     06-23                              11.0   0.69  )-----------( 10       ( 23 Jun 2018 06:11 )             32.8   < from: CT Abdomen and Pelvis No Cont (06.21.18 @ 00:36) >    EXAM:  CT ABDOMEN AND PELVIS                            PROCEDURE DATE:  06/21/2018          INTERPRETATION:  CT ABDOMEN AND PELVIS WITHOUT CONTRAST    INDICATION: Abdominal pain.    TECHNIQUE: Abdominopelvic CT without intravenous contrast.    COMPARISON 5/7/2018.    FINDINGS:    Evaluation of the solid organs and vasculature limited in the absence of   intravenous contrast.     Lower Thorax: Small right pleural effusion with adjacent probable   atelectasis although consolidation is possible, slightly increased from   prior exam. Cardiomegaly with moderate pericardial effusion which is   grossly stable.        Liver: Innumerable hypodense cysts throughout the liver.  Biliary: No dilatation. Cholelithiasis.  Spleen: No suspicious lesions.   Pancreas: No inflammatory changes or ductal dilatation.      Adrenals: Normal.      Kidneys: No hydronephrosis or solid mass. Polycystic disease. Stable   appearance of left lower quadrant renal transplant with possible vascular   aneurysm.  Vessels: Normal caliber.        GI tract: No evidence of small bowel obstruction. No wall thickening or   inflammatory changes.        Peritoneum/retroperitoneum and mesentery: No free air. No organized fluid   collection. No adenopathy. Small volume ascites.    Pelvic organs/Bladder: No pelvic masses. Bladder is normal.        Abdominal wall: Unremarkable.  Bones and soft tissues: No destructive lesion. Mild anasarca.   Degenerative changes of the spine. Grade 1 anterolisthesis of L4.    IMPRESSION:  No evidence of obstruction or definite evidence of bowel inflammation.    Small right pleural effusion with moderate pericardial effusion. Small   volume ascites and anasarca.    No other interval change from prior exam. Polycystic kidney and liver   disease.                ANEL SANDOVAL   This document has been electronically signed. Jun 21 2018 12:52AM        < end of copied text >  < from: US Abdomen Complete (06.21.18 @ 11:52) >    EXAM:  US COMPLETE ABDOMEN SONOGRAM                            PROCEDURE DATE:  06/21/2018          INTERPRETATION:      Ultrasound of the abdomen         CLINICAL INFORMATION:  Diffuse abdominal pain after eating, came of   therapy for Merkel cell        TECHNIQUE:   Transcutaneous ultrasonography of the abdomen was performed.    FINDINGS:    No previous examinations are available for review.    The liver demonstrates heterogeneous echotexture with multiple liver   cysts.  Hepatic size and contours are maintained.  The main hepatic and   portal veins appear patent.  There is mild intrahepatic biliary ductal   dilatation. The common bile duct is dilated measuring 1.3 cm. The   gallbladder is distended without calculi. The wall is thickenedmeasuring   0.45 cm.  The pancreas appears intact, allowing for the limited   evaluation by the ultrasound technique.  The spleen size is normal.    The right kidney measures 17.4 cm in length.  It demonstrates multiple   cysts consistent with polycystic kidney disease. there is no   hydronephrosis.     The left kidney measures 15.3 cm in length.  It demonstrates small cysts   consistent with polycystic kidney disease. There is no hydronephrosis.    The LEFT renal transplant measures 11.6 cm and contains a 1.7 cm cyst in   the midpole. The transplant LEFT renal artery and vein are intact.    The abdominal aorta measures normal caliber at maximum anterior to   posterior.  The retroperitoneal structures and IVC appear intact.    Bilateral pleural effusions are noted.      IMPRESSION:   Multiple hepatic cysts. Polycystic renal disease with LEFT   renal transplant which appears to be intact. Mild intra and extrahepatic   biliary ductal dilatation. Thickened gallbladder wall which may be   reactive due to fluid imbalance. Bilateral pleural effusions.                      TERESA LAKHANI M.D., ATTENDING RADIOLOGIST  This document has been electronically signed. Jun 21 2018  8:19PM    < end of copied text >

## 2018-06-23 NOTE — CONSULT NOTE ADULT - ASSESSMENT
Hx merkel cell cancer   chemo 10 days ago-etopaside and carboplatin and now pancytopenic   PCKD with elevated creatinine    abd pelvis ct from 6/21 noted but abdominal exam concerning given abd guarding and distension   previous ct scan with sb lesion / ?mass   r/o obstruction /r/o perforation  surgery stat consult  abx   also check lft  cholethiasis and dilated bile duct on sonogram study r/o bile duct stone  check ct scan repeat today  npo except po meds    note low ef and fluid thirdspacing and elevated creatinine-consider cardiology and renal follow up/consultation Hx merkel cell cancer   chemo 10 days ago-etopaside and carboplatin and now pancytopenic   PCKD with elevated creatinine    abd pelvis ct from 6/21 noted but abdominal exam concerning given abd guarding and distension   previous ct scan with sb lesion / ?mass   r/o obstruction /r/o perforation  surgery stat consult  abx   also check lft  cholethiasis and dilated bile duct on sonogram study r/o bile duct stone  check ct scan repeat today  npo except po meds    note low ef and fluid thirdspacing and elevated creatinine-consider cardiology and renal follow up/consultation    addendum-ct scan with dilated small bowel without transition point suggestive of Ileus  rec-check mg, phos, electrolytes , renal consult for worsening renal function, reduce narcotic use and consider ng tube if vomiting recurs  liver cysts and kidney cysts consistent with PCKD  no evidence for biliary obstruction, lft normal  Hepatic Function Panel (06.23.18 @ 06:11)    Indirect Reacting Bilirubin: 0.5 mg/dL    Protein Total, Serum: 5.4 gm/dL    Albumin, Serum: 2.0 g/dL    Bilirubin Total, Serum: 0.9 mg/dL    Bilirubin Direct, Serum: 0.4 mg/dL    Alkaline Phosphatase, Serum: 65 U/L    Aspartate Aminotransferase (AST/SGOT): 27 U/L    Alanine Aminotransferase (ALT/SGPT): 30 U/L

## 2018-06-23 NOTE — CHART NOTE - NSCHARTNOTEFT_GEN_A_CORE
Patient seen at the bedside. Reports that her heart is racing and that she has pressured abdominal pain that hurts when she moves. Reports these symptoms are unchanged from earlier today and that is considering further abdominal imaging despite understanding the risk of contrast. However, she would like to wait until morning. No CP.    PE  CV: S1/S2  Resp: CTA b/l, heavy breathing  Abd: Soft, distended. RUQ tenderness to palpation. No rigidity, guarding or rebound tenderness. Pitting edema over abdomen  Peripheral: Pulses present    A/P  -Consider additional abdomen imaging in AM with contrast  -Continue BB for rate control.   -EKG reviewed and no significant changes

## 2018-06-23 NOTE — CONSULT NOTE ADULT - ASSESSMENT
68 yo fem h/o Skin cell cancer with groin lymph nodes mets, neutropenic, ileus, worsening renal failure  -Will f/u CT scan  -I think ileus is due to being neutropenic/ chemotherapy. Patient has been receiving morphine which doesn't help with ileus  -Patient needs to get OOB and ambulate which would help to regain bowel function  -Wait for bowel activity in order to start diet  -Cont PO laxatives  -Worsening renal failure won't help to clear out metabolites in her system which could be causing ileus, she might need Hemodyalisis?

## 2018-06-23 NOTE — PROGRESS NOTE ADULT - ASSESSMENT
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  Pt reports diffuse metastasis throughout the chest and abdomen, yet does not suffer chronic abdominal pain.  Pt c/o abdominal pain, diffuse throughout the entire abdomen since about 6pm after eating.  +nausea with vomiting x 4.  No diarrhea.  +chronic constipation.  Has a Rx for oxycodone, which she has never taken, until tonight when she took a dose which did not provide relief.  Past surgical hx significant for hernia repair.  Pain is located in the epigastric area and lower abd with vaginal irradiation at times; relieved by morphine. Had a temp of 101 in ED and received Dapto  Pt is off Imuran due to recent chemo; she received Etoposide and Carboplatin 1 week ago at [facility]. (21 Jun 2018 10:20)    6/22-  Echo suggests that the pericardial effusion is unchanged from an older study. Although, there is signs of RV compromise.   Will discuss pericardial with the team. She is clinically asymptomatic but concerned that if we don't drain the pericardium, she will be in trouble down the road.     6/23-  I tried to contact her outside cardiologist, Dr Aguilar but he is unavailable until Monday.  From a cardiac standpoint, she is able to maintain a good blood pressure.   She is short of breath but I am not sure how much is from her heart and how much is from her painful, distended abdomen.   Today, will increase the Lopressor for better heart rate control.   will speak to her outside cardiologist on monday and discuss whether we should have a pericardiocentesis procedure.   Her creatinine has increased. She is due for a transfusion today which will give her volume. will recheck BMP tomorrow.   continued care as per hematology regarding low WBC, Low Platelets, and anemia.

## 2018-06-23 NOTE — BRIEF OPERATIVE NOTE - PROCEDURE
<<-----Click on this checkbox to enter Procedure Cholecystectomies, laparoscopic  06/23/2018    Active  SILVERIO

## 2018-06-23 NOTE — CONSULT NOTE ADULT - SUBJECTIVE AND OBJECTIVE BOX
history of present illness as per chart and discussion with patient and   67-year-old female with multiple medical problems including metastatic Merkel cell carcinoma; being treated at [facility] [place].  Previously treated with immunotherapy 2 cycles without apparent response.  Subsequent changed to carboplatin and -16; after 2 cycles radiographic studies suggested in response and the patient has been given  additional  3rd cycle  on June 12 13 and 14; the day after she received Neulasta.  Several days ago patient developed new onset abdominal pain, excruciating and she subsequently took oxycodone;  This was insufficient and she presented to the hospital for evaluation.  Patient has persistent aabdominal pain,  nausea some regurgitation no cyndie vomiting no diarrhea and no flatus; denies fevers or shaking chills. She is weak, has no appetite and is barely eating.  Found to have pancytopenia.    Past medical history social history as per previous chart in consultation May 9, 2018  Includes polycystic kidney disease, kidney transplant 2000 at [facility].  Patient has been off immunosuppression suppression therapy for many months  Extensive squamous cell carcinomas of the skin, hypertension, hyperlipidemia, Raynaud's disease, mitral valve prolapse    Physical examination chronically ill debilitated-looking 67-year-old female awake alert in bed  HEENT normocephalic oropharynx dry  Neck supple  Lungs without rhonchi or wheezes decreased air movement bilaterally  Heart tachycardia  Abdomen lower abdomen distended soft diffuse tenderness bowel sounds not appreciated  Extremities with muscle wasting, left upper arm with dialysis fistula  Skin numerous scars from previous skin cancer resections, very poor turgor ecchymoses on extremities    WBC 0.69 Hgb 11 HCT 32.8 platelets 10,000  BUN 57 creatinine 2.2  Albumin 2.0 total protein 5.4  Haptoglobin 254  Iron  decreased 13%        iron saturation decreased 9%      CT scan abdomen and pelvis without any contrast  No evident obstruction or free air  Ascites  Pericardial effusion, small right pleural effusion

## 2018-06-23 NOTE — PROGRESS NOTE ADULT - SUBJECTIVE AND OBJECTIVE BOX
Patient is a 67y Female who continues with abdominal pain, they report stable/peristent sicne wednesday. limited intake or appetites    REVIEW OF SYSTEMS:    CONSTITUTIONAL: stable weakness, fevers or chills  RESPIRATORY: No cough, wheezing, hemoptysis; No shortness of breath  CARDIOVASCULAR: No chest pain or palpitations  GENITOURINARY: No dysuria, frequency or hematuria  All other review of systems is negative unless indicated above.    MEDICATIONS  (STANDING):  aspirin enteric coated 81 milliGRAM(s) Oral daily  cefepime  Injectable. 1000 milliGRAM(s) IV Push every 12 hours  docusate sodium 100 milliGRAM(s) Oral three times a day  magnesium oxide 400 milliGRAM(s) Oral daily  metoprolol succinate ER 50 milliGRAM(s) Oral daily  metoprolol succinate ER 25 milliGRAM(s) Oral at bedtime  pantoprazole  Injectable 40 milliGRAM(s) IV Push daily  predniSONE   Tablet 5 milliGRAM(s) Oral daily  sodium chloride 0.9% Bolus 500 milliLiter(s) IV Bolus every 15 minutes  vancomycin    Solution 125 milliGRAM(s) Oral every 6 hours    MEDICATIONS  (PRN):  acetaminophen   Tablet 650 milliGRAM(s) Oral every 6 hours PRN pain fever  aluminum hydroxide/magnesium hydroxide/simethicone Suspension 30 milliLiter(s) Oral every 4 hours PRN Dyspepsia  morphine  - Injectable 4 milliGRAM(s) IV Push every 3 hours PRN Moderate Pain (4 - 6)  ondansetron Injectable 4 milliGRAM(s) IV Push every 6 hours PRN Nausea        T(C): , Max: 37 (06-22-18 @ 14:16)  T(F): , Max: 98.6 (06-22-18 @ 14:16)  HR: 92 (06-23-18 @ 10:47)  BP: 112/54 (06-23-18 @ 10:47)  BP(mean): --  RR: 18 (06-23-18 @ 10:47)  SpO2: 98% (06-23-18 @ 10:47)  Wt(kg): --    06-22 @ 07:01  -  06-23 @ 07:00  --------------------------------------------------------  IN: 629 mL / OUT: 400 mL / NET: 229 mL    PHYSICAL EXAM:    Constitutional: frail/cachectic  HEENT: PERRLA, EOMI,  MMM  Neck: No LAD, No JVD  Respiratory: good aeration b/l  Cardiovascular: S1 and S2, RRR  Gastrointestinal: distended  Extremities: le chronic changes/ peripheral edema  Neurological: A/O x 3, no focal deficits  Psychiatric: Normal mood, normal affect  : No Manning  Skin: No rashes  Access: Not applicable        LABS:                        11.0   0.69  )-----------( 10       ( 23 Jun 2018 06:11 )             32.8     23 Jun 2018 06:11    137    |  108    |  57     ----------------------------<  173    4.8     |  17     |  2.21   22 Jun 2018 06:16    140    |  110    |  39     ----------------------------<  134    4.9     |  19     |  1.55   20 Jun 2018 23:07    139    |  105    |  37     ----------------------------<  139    4.2     |  26     |  1.27     Ca    8.8        23 Jun 2018 06:11  Ca    8.4        22 Jun 2018 06:16  Ca    8.6        20 Jun 2018 23:07  Phos  5.9       23 Jun 2018 06:11  Phos  2.5       20 Jun 2018 23:07  Mg     2.2       23 Jun 2018 06:11  Mg     1.7       20 Jun 2018 23:07    TPro  5.4    /  Alb  2.7    /  TBili  0.7    /  DBili  x      /  AST  19     /  ALT  12     /  AlkPhos  121    20 Jun 2018 23:07          Urine Studies:          RADIOLOGY & ADDITIONAL STUDIES:

## 2018-06-24 VITALS — TEMPERATURE: 99 F | OXYGEN SATURATION: 90 % | RESPIRATION RATE: 16 BRPM | HEART RATE: 111 BPM

## 2018-06-24 PROCEDURE — 93010 ELECTROCARDIOGRAM REPORT: CPT

## 2018-06-24 RX ORDER — SODIUM CHLORIDE 9 MG/ML
1000 INJECTION, SOLUTION INTRAVENOUS
Qty: 0 | Refills: 0 | Status: DISCONTINUED | OUTPATIENT
Start: 2018-06-24 | End: 2018-06-25

## 2018-06-24 RX ADMIN — MORPHINE SULFATE 2 MILLIGRAM(S): 50 CAPSULE, EXTENDED RELEASE ORAL at 06:14

## 2018-06-24 RX ADMIN — Medication 100 MILLIGRAM(S): at 13:57

## 2018-06-24 RX ADMIN — MORPHINE SULFATE 2 MILLIGRAM(S): 50 CAPSULE, EXTENDED RELEASE ORAL at 21:44

## 2018-06-24 RX ADMIN — Medication 125 MILLIGRAM(S): at 17:23

## 2018-06-24 RX ADMIN — Medication 100 MILLIGRAM(S): at 05:33

## 2018-06-24 RX ADMIN — MORPHINE SULFATE 2 MILLIGRAM(S): 50 CAPSULE, EXTENDED RELEASE ORAL at 21:11

## 2018-06-24 RX ADMIN — Medication 125 MILLIGRAM(S): at 23:31

## 2018-06-24 RX ADMIN — SODIUM CHLORIDE 50 MILLILITER(S): 9 INJECTION, SOLUTION INTRAVENOUS at 21:03

## 2018-06-24 RX ADMIN — Medication 125 MILLIGRAM(S): at 13:21

## 2018-06-24 RX ADMIN — MORPHINE SULFATE 2 MILLIGRAM(S): 50 CAPSULE, EXTENDED RELEASE ORAL at 00:41

## 2018-06-24 RX ADMIN — Medication 100 MILLIGRAM(S): at 21:03

## 2018-06-24 RX ADMIN — Medication 100 MILLIGRAM(S): at 06:08

## 2018-06-24 RX ADMIN — PANTOPRAZOLE SODIUM 40 MILLIGRAM(S): 20 TABLET, DELAYED RELEASE ORAL at 13:57

## 2018-06-24 RX ADMIN — PIPERACILLIN AND TAZOBACTAM 25 GRAM(S): 4; .5 INJECTION, POWDER, LYOPHILIZED, FOR SOLUTION INTRAVENOUS at 17:22

## 2018-06-24 RX ADMIN — PIPERACILLIN AND TAZOBACTAM 25 GRAM(S): 4; .5 INJECTION, POWDER, LYOPHILIZED, FOR SOLUTION INTRAVENOUS at 06:47

## 2018-06-24 RX ADMIN — Medication 125 MILLIGRAM(S): at 06:13

## 2018-06-24 RX ADMIN — Medication 5 MILLIGRAM(S): at 06:12

## 2018-06-24 RX ADMIN — Medication 50 MILLIGRAM(S): at 06:11

## 2018-06-24 RX ADMIN — MORPHINE SULFATE 2 MILLIGRAM(S): 50 CAPSULE, EXTENDED RELEASE ORAL at 17:22

## 2018-06-24 RX ADMIN — Medication 110 MILLIGRAM(S): at 22:24

## 2018-06-24 NOTE — PROVIDER CONTACT NOTE (OTHER) - SITUATION
Spoke to Michelle @ doctor's office.
Pts lactate elevated at 2.6 after 4 L of fluid given.  Trended down from 3.3 to 2.3 and then went to 2.6.
Spoke to Marta @ doctor's office.
Spoke to Tessy @ doctor's office.
Spoke with Belkis at service
pt made comfort care today, ivf still ordered but was not in place when patient came to unit. RN wondering if IVF should continue or not.
spoke with Adilia at service

## 2018-06-24 NOTE — PROGRESS NOTE ADULT - SUBJECTIVE AND OBJECTIVE BOX
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  Pt reports diffuse metastasis throughout the chest and abdomen, yet does not suffer chronic abdominal pain.  Pt c/o abdominal pain, diffuse throughout the entire abdomen since about 6pm after eating.  +nausea with vomiting x 4.  No diarrhea.  +chronic constipation.  Has a Rx for oxycodone, which she has never taken, until tonight when she took a dose which did not provide relief.  Past surgical hx significant for hernia repair.  Pain is located in the epigastric area and lower abd with vaginal irradiation at times; relieved by morphine. Had a temp of 101 in ED and received Dapto  Pt is off Imuran due to recent chemo; she received Etoposide and Carboplatin 1 week ago at [facility]. (2018 10:20)    -  seems short of breath but she states that is because she was having alot of abdominal pain.    states she seem to get sick on wednesday and has progressively gotten worse.     -  continues with abdominal pain and distension. scheduled for CT of the abdomen today.   decreased appetite.  overnight, episodes of sinus tachycardia in the 150s. has been able to maintain a good blood pressure.   Echo showed moderate pericardial effusion which is unchanged.   scheduled for plasma transfusion today.  very uncomfortable.     -  Because of the worsening condition, she is now on comfort care.   denies chest pain or shortness of breath.       Allergies    bacitracin (Rash)  Levaquin (Sedation/Somnol; Muscle Pain)  tetracyclines (Other)    Intolerances        MEDICATIONS  (STANDING):  aspirin enteric coated 81 milliGRAM(s) Oral daily  docusate sodium 100 milliGRAM(s) Oral three times a day  folic acid 1 milliGRAM(s),dextrose 50 milliLiter(s) 1 milliGRAM(s) IV Intermittent daily  magnesium oxide 400 milliGRAM(s) Oral daily  metoprolol succinate ER 50 milliGRAM(s) Oral daily  metoprolol succinate ER 25 milliGRAM(s) Oral at bedtime  metroNIDAZOLE  IVPB      metroNIDAZOLE  IVPB 500 milliGRAM(s) IV Intermittent every 8 hours  pantoprazole  Injectable 40 milliGRAM(s) IV Push daily  piperacillin/tazobactam IVPB. 3.375 Gram(s) IV Intermittent every 12 hours  polyethylene glycol 3350 17 Gram(s) Oral daily  predniSONE   Tablet 5 milliGRAM(s) Oral daily  senna 2 Tablet(s) Oral at bedtime  sodium chloride 0.45% 1000 milliLiter(s) (75 mL/Hr) IV Continuous <Continuous>  sodium chloride 0.9% Bolus 500 milliLiter(s) IV Bolus every 15 minutes  sodium ferric gluconate complex IVPB 125 milliGRAM(s) IV Intermittent <User Schedule>  vancomycin    Solution 125 milliGRAM(s) Oral every 6 hours    MEDICATIONS  (PRN):  acetaminophen   Tablet 650 milliGRAM(s) Oral every 6 hours PRN pain fever  aluminum hydroxide/magnesium hydroxide/simethicone Suspension 30 milliLiter(s) Oral every 4 hours PRN Dyspepsia  morphine  - Injectable 2 milliGRAM(s) IV Push every 4 hours PRN Severe Pain (7 - 10)  morphine  - Injectable 2 milliGRAM(s) SubCutaneous every 6 hours PRN Severe Pain (7 - 10)  ondansetron Injectable 4 milliGRAM(s) IV Push every 6 hours PRN Nausea    REVIEW OF SYSTEMS:    RESPIRATORY: No cough, wheezing, hemoptysis; No shortness of breath  CARDIOVASCULAR: No chest pain or palpitations  All other review of systems is negative unless indicated above      PHYSICAL EXAM:  Daily     Daily Weight in k.6 (2018 10:47)  Vital Signs Last 24 Hrs  T(C): 37.1 (2018 05:28), Max: 37.1 (2018 05:28)  T(F): 98.7 (2018 05:28), Max: 98.7 (2018 05:28)  HR: 111 (2018 05:28) (87 - 157)  BP: 105/76 (2018 21:54) (105/76 - 115/47)  BP(mean): --  RR: 16 (2018 05:28) (16 - 18)  SpO2: 90% (2018 05:28) (90% - 99%)    Constitutional: NAD, awake and alert, well-developed  HEENT: PERR, EOMI, Normal Hearing, MMM  Neck: Soft and supple, No LAD, No JVD  Respiratory: Breath sounds are clear bilaterally, No wheezing, rales or rhonchi  Cardiovascular: S1 and S2, regular rate and rhythm, no Murmurs, gallops or rubs  Gastrointestinal: tender and distended  Extremities: No peripheral edema  Vascular: 2+ peripheral pulses  Neurological: A/O x 3, no focal deficits  Musculoskeletal: 5/5 strength b/l upper and lower extremities  Skin: No rashes    LABS: All Labs Reviewed:                        11.0   0.69  )-----------( 10       ( 2018 06:11 )             32.8         137  |  108  |  57<H>  ----------------------------<  173<H>  4.8   |  17<L>  |  2.21<H>    Ca    8.8      2018 06:11  Phos  5.9       Mg     2.2         TPro  5.4<L>  /  Alb  2.0<L>  /  TBili  0.9  /  DBili  0.4<H>  /  AST  27  /  ALT  30  /  AlkPhos  65

## 2018-06-24 NOTE — PROGRESS NOTE ADULT - ASSESSMENT
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  Pt reports diffuse metastasis throughout the chest and abdomen, yet does not suffer chronic abdominal pain.  Pt c/o abdominal pain, diffuse throughout the entire abdomen since about 6pm after eating.  +nausea with vomiting x 4.  No diarrhea.  +chronic constipation.  Has a Rx for oxycodone, which she has never taken, until tonight when she took a dose which did not provide relief.  Past surgical hx significant for hernia repair.  Pain is located in the epigastric area and lower abd with vaginal irradiation at times; relieved by morphine. Had a temp of 101 in ED and received Dapto  Pt is off Imuran due to recent chemo; she received Etoposide and Carboplatin 1 week ago at [facility]. (21 Jun 2018 10:20)    6/22-  Echo suggests that the pericardial effusion is unchanged from an older study. Although, there is signs of RV compromise.   Will discuss pericardial with the team. She is clinically asymptomatic but concerned that if we don't drain the pericardium, she will be in trouble down the road.     6/23-  I tried to contact her outside cardiologist, Dr Aguilar but he is unavailable until Monday.  From a cardiac standpoint, she is able to maintain a good blood pressure.   She is short of breath but I am not sure how much is from her heart and how much is from her painful, distended abdomen.   Today, will increase the Lopressor for better heart rate control.   will speak to her outside cardiologist on monday and discuss whether we should have a pericardiocentesis procedure.   Her creatinine has increased. She is due for a transfusion today which will give her volume. will recheck BMP tomorrow.   continued care as per hematology regarding low WBC, Low Platelets, and anemia.     6/24-  comfort care now.  will still speak to her cardiologist about the pericardial effusion but as of now, no intervention indicated.   Please reconsult as needed

## 2018-06-24 NOTE — PROGRESS NOTE ADULT - ASSESSMENT
Merkel cell cancer met  perforated bowel    pt/ decided for pt not to have surgery  comfort measures   will sign off.

## 2018-06-24 NOTE — PROGRESS NOTE ADULT - SUBJECTIVE AND OBJECTIVE BOX
Patient is a 67y old  Female who presents with a chief complaint of Sent in by Dr. carole Roca for fever (05 May 2018 03:18)    Date of service: 06-24-18 @ 10:34  Patient events noted; still with abdominal pain, lethargic but arousable  ROS: unable to obtain secondary to patient medical condition     MEDICATIONS  (STANDING):  aspirin enteric coated 81 milliGRAM(s) Oral daily  docusate sodium 100 milliGRAM(s) Oral three times a day  folic acid 1 milliGRAM(s),dextrose 50 milliLiter(s) 1 milliGRAM(s) IV Intermittent daily  magnesium oxide 400 milliGRAM(s) Oral daily  metoprolol succinate ER 50 milliGRAM(s) Oral daily  metoprolol succinate ER 25 milliGRAM(s) Oral at bedtime  metroNIDAZOLE  IVPB      metroNIDAZOLE  IVPB 500 milliGRAM(s) IV Intermittent every 8 hours  pantoprazole  Injectable 40 milliGRAM(s) IV Push daily  piperacillin/tazobactam IVPB. 3.375 Gram(s) IV Intermittent every 12 hours  polyethylene glycol 3350 17 Gram(s) Oral daily  predniSONE   Tablet 5 milliGRAM(s) Oral daily  senna 2 Tablet(s) Oral at bedtime  sodium chloride 0.45% 1000 milliLiter(s) (75 mL/Hr) IV Continuous <Continuous>  sodium chloride 0.9% Bolus 500 milliLiter(s) IV Bolus every 15 minutes  sodium ferric gluconate complex IVPB 125 milliGRAM(s) IV Intermittent <User Schedule>  vancomycin    Solution 125 milliGRAM(s) Oral every 6 hours    MEDICATIONS  (PRN):  acetaminophen   Tablet 650 milliGRAM(s) Oral every 6 hours PRN pain fever  aluminum hydroxide/magnesium hydroxide/simethicone Suspension 30 milliLiter(s) Oral every 4 hours PRN Dyspepsia  morphine  - Injectable 2 milliGRAM(s) IV Push every 4 hours PRN Severe Pain (7 - 10)  morphine  - Injectable 2 milliGRAM(s) SubCutaneous every 6 hours PRN Severe Pain (7 - 10)  ondansetron Injectable 4 milliGRAM(s) IV Push every 6 hours PRN Nausea      Vital Signs Last 24 Hrs  T(C): 37.1 (24 Jun 2018 05:28), Max: 37.1 (24 Jun 2018 05:28)  T(F): 98.7 (24 Jun 2018 05:28), Max: 98.7 (24 Jun 2018 05:28)  HR: 111 (24 Jun 2018 05:28) (87 - 157)  BP: 105/76 (23 Jun 2018 21:54) (105/76 - 115/47)  BP(mean): --  RR: 16 (24 Jun 2018 05:28) (16 - 18)  SpO2: 90% (24 Jun 2018 05:28) (90% - 98%)    Physical Exam:        Constitutional: frail looking  HEENT: NC/AT, EOMI, PERRLA  Neck: supple  Back: no tenderness  Respiratory: clear  Cardiovascular: S1S2 regular, no murmurs  Abdomen: soft, difusely tender, distended  Genitourinary: deferred  Rectal: deferred  Musculoskeletal: no muscle tenderness, no joint swelling or tenderness  Extremities: no pedal edema  Neurological: AxOx3, moving all extremities, no focal deficits  Skin:multiple skin lesions on extremities; dry and  crusting; no pus visible      Labs:                        11.0   0.69  )-----------( 10       ( 23 Jun 2018 06:11 )             32.8     06-23    137  |  108  |  57<H>  ----------------------------<  173<H>  4.8   |  17<L>  |  2.21<H>    Ca    8.8      23 Jun 2018 06:11  Phos  5.9     06-23  Mg     2.2     06-23    TPro  5.4<L>  /  Alb  2.0<L>  /  TBili  0.9  /  DBili  0.4<H>  /  AST  27  /  ALT  30  /  AlkPhos  65  06-23           Cultures:       Culture - Urine (collected 06-21-18 @ 01:10)  Source: .Urine Clean Catch (Midstream)  Final Report (06-22-18 @ 11:08):    No growth    Culture - Blood (collected 06-21-18 @ 01:01)  Source: .Blood None  Preliminary Report (06-22-18 @ 08:01):    No growth to date.    Culture - Blood (collected 06-21-18 @ 00:53)  Source: .Blood None  Preliminary Report (06-22-18 @ 08:01):    No growth to date.    < from: CT Abdomen and Pelvis w/ Oral Cont (06.23.18 @ 16:50) >    EXAM:  CT ABDOMEN AND PELVIS OC                            PROCEDURE DATE:  06/23/2018          INTERPRETATION:    CT OF THE ABDOMEN AND PELVIS WITHOUT IV CONTRAST     CLINICAL INDICATION: generalized diffuse abd pain    TECHNIQUE: A CT scan of theabdomen and pelvis was performed from the   domes of the diaphragm to the symphysis pubis with oral contrast only.    Sagittal and coronal reformatted images were provided.    COMPARISON: 6/21/2018    FINDINGS:   Note limited evaluation of the solid organs and vasculature in the   absence of intravenous contrast.    LOWER THORAX: Small to moderate right pleural effusion and small left   pleural effusion with underlying compressive atelectasis is markedly   worsened since prior exam. Moderate pericardial effusion is unchanged..    LIVER:  Innumerable cysts in the liver are again identified..  GALLBLADDER: Small thin linear calculus in a distended gallbladder.  BILIARY TREE: Unremarkable.  PANCREAS: Unremarkable.  SPLEEN: Unremarkable.  ADRENALS: Unremarkable.  KIDNEYS/URETERS: Diffusely enlarged kidneys with innumerable cysts in the   bilateral kidneys, compatible with known polycystic kidney disease.   Numerous calculi in the bilateral native kidneys. Renal transplant in the   left pelvisis present..    BOWEL: Diffusely dilated small bowel loops without a distinct transition   point. Oral contrast passes into the large bowel. These findings likely   reflect an ileus, however an obstruction cannot be excluded. No definite   CT evidence of diverticulitis.    PERITONEUM/RETROPERITONEUM: Moderate amount of scattered free air within   the upper and mid abdomen, new since prior exam, of unknown origin. Small   amount of free fluid in the perihepatic region is unchanged. Small new   focus of ascites in the left lateral mid abdomen. Moderate free fluid in   the pelvis.    BLADDER: Manning catheter within the bladder.  REPRODUCTIVE ORGANS: Unremarkable. Vaginal pessary is in place.    VASCULATURE: Within normal limits.  ABDOMINAL WALL:Unremarkable  BONES: No aggressive osseous lesion.    IMPRESSION:  Moderate amount of scattered free air within the upper and mid abdomen,   new since prior exam, of unknown origin. Small amount of free fluid in   the perihepatic region is unchanged.Small new focus of ascites in the   left lateral mid abdomen. Moderate free fluid in the pelvis.  Diffusely   dilated small bowel loops without a distinct transition point. Oral   contrast passes into the large bowel. These findings likely reflect an  ileus, however an obstruction cannot be excluded. No definite CT evidence   of diverticulitis.    Small to moderate right pleural effusion and small left pleural effusion   with underlying compressive atelectasis is markedly worsened since prior   exam. Moderate pericardial effusion is unchanged..    Rest of the findings as above.    < end of copied text >          < from: CT Abdomen and Pelvis No Cont (06.21.18 @ 00:36) >    EXAM:  CT ABDOMEN AND PELVIS                            PROCEDURE DATE:  06/21/2018          INTERPRETATION:  CT ABDOMEN AND PELVIS WITHOUT CONTRAST    INDICATION: Abdominal pain.    TECHNIQUE: Abdominopelvic CT without intravenous contrast.    COMPARISON 5/7/2018.    FINDINGS:    Evaluation of the solid organs and vasculature limited in the absence of   intravenous contrast.     Lower Thorax: Small right pleural effusion with adjacent probable   atelectasis although consolidation is possible, slightly increased from   prior exam. Cardiomegaly with moderate pericardial effusion which is   grossly stable.        Liver: Innumerable hypodense cysts throughout the liver.  Biliary: No dilatation. Cholelithiasis.  Spleen: No suspicious lesions.   Pancreas: No inflammatory changes or ductal dilatation.      Adrenals: Normal.      Kidneys: No hydronephrosis or solid mass. Polycystic disease. Stable   appearance of left lower quadrant renal transplant with possible vascular   aneurysm.  Vessels: Normal caliber.        GI tract: No evidence of small bowel obstruction. No wall thickening or   inflammatory changes.        Peritoneum/retroperitoneum and mesentery: No free air. No organized fluid   collection. No adenopathy. Small volume ascites.    Pelvic organs/Bladder: No pelvic masses. Bladder is normal.        Abdominal wall: Unremarkable.  Bones and soft tissues: No destructive lesion. Mild anasarca.   Degenerative changes of the spine. Grade 1 anterolisthesis of L4.    IMPRESSION:  No evidence of obstruction or definite evidence of bowel inflammation.    Small right pleural effusion with moderate pericardial effusion. Small   volume ascites and anasarca.    No other interval change from prior exam. Polycystic kidney and liver   disease.        < end of copied text >                  Radiology: all available radiological tests reviewed    Advanced directives addressed: full resuscitation

## 2018-06-24 NOTE — PROGRESS NOTE ADULT - ASSESSMENT
*Pancytopenia most likely due to chemo  -transfer to [facility] surg - comfort care   -S/P 2 units of PRBC (6/22)  -S/P 1 unit of platelets (6/23)  -plt transfusion if <10K or signs of any bleeding  -Neutropenic precautions  -[facility] Evaluation - ?neulasta    *Fever and Abdominal Pain 2ndry to Perforated Bowel and Peritonitis  -CT abd  no contrast - NAD  -CT Abd with Oral contrast - Moderate air and ileus  -GI and surgery consult appreciated  -comfort care  -ID consult appreciated  -neutropenic precautions  -S/P cefepime 1 gram q 12 hours   -continue po vancomycin to prevent cdif  -switch to Zosyn and Flagyl    *Tachycardia 2ndry to anemia and possible infection  -continue tele  -increase Metoprolol 50 Am and 25 PM, if BP permits   -cardio consult appreciated     *Moderate pericardial effusion, Ascites and ansarca on CT 2ndry to Chronic Systolic CHF  -hold Diuretic due to low BP and ARF  -ECHO Estimated left ventricular ejection fraction is 40 %, function is decreased since prior study 05/08/2018  -cardio consult appreciated      *Urinary Retention  -Manning Placed  -TOV once stable    *Merkel cell carcinoma.  -On Chemo: Carboplatin and Etoposide   -patients outside [facility] Dr Gutierrez [phone]    *CKD stage 3 - Cr near baseline 1.5 - 1.6 while on lasix  -h/o Renal transplant - not on any immunosupression due to acitve malignancy  -Renal Fx is stable   -Continue prednisone  -Avoid nephrotoxic agents   -if we decide on CT w Dye then would give gentle IV saline preprocedure  -renal consult appreciated    *HTN  -Monitor BP, stable   -Hold amlodipine   -continue Metoprolol    *DVT PPXs: Cannot give chemical Anticoagulation due to very low platelet.    *Discussed with patient and  and at this time patient does not want the surgery.  Deciding upon Home hospice versus inpatient hospice. Stop Blood draws, vitals, tele monitor and oral meds

## 2018-06-24 NOTE — PROVIDER CONTACT NOTE (OTHER) - DATE AND TIME:
22-Jun-2018 10:57
21-Jun-2018 06:00
21-Jun-2018 14:59
22-Jun-2018 05:44
22-Jun-2018 11:00
22-Jun-2018 11:05
23-Jun-2018 06:41
23-Jun-2018 12:21
24-Jun-2018 20:20

## 2018-06-24 NOTE — PROVIDER CONTACT NOTE (OTHER) - NAME OF MD/NP/PA/DO NOTIFIED:
Dr Mccormack
Dr Patterson
Dr. Aguilar
Dr. Erazo
Dr. Flores
Dr. Valle
[facility] [place] service aware
dr. varghese
Dr. Oscar

## 2018-06-24 NOTE — PROGRESS NOTE ADULT - ASSESSMENT
68 y/o F with a PMHx of Merkel cell carcinoma, HLD, HTN, s/p kidney transplant (17 years ago, off immunotherapy), history of cdiff in past, recent hospitalization in May 2018 for sepsis secondary to Klebsiella admitted on 6/20 for evaluation of acute onset of abdominal pain; patient had chemotherapy with cisplatin and etoposide. Notes nausea no vomiting. No other complaints.   1. Patient admitted with abdominal pain, now neutropenic; unclear source of her pain; possibly secondary to constipation? Found to have free air in abdomen, perforated viscus?  - follow up cultures  - serial cbc and monitor temperature   - neutropenic precautions  - day #2 zosyn and flagyl  - suggest palliative care eval--hospice?  - surgery evaluation  - on po vancomycin to prevent cdiff  - skin lesions do not look infected  - cardiology and nephrology evaluations  2. other issues: Merkel cell carcinoma, HLD, HTN, s/p kidney transplant (17 years ago, off immunotherapy)  - per medicine

## 2018-06-24 NOTE — PROGRESS NOTE ADULT - SUBJECTIVE AND OBJECTIVE BOX
The pt on ct scan had free air and dilated sb  She has distension of abdomen but appears comfortable today  not passing flatus    Allergies    bacitracin (Rash)  Levaquin (Sedation/Somnol; Muscle Pain)  tetracyclines (Other)    Intolerances        MEDICATIONS  (STANDING):  aspirin enteric coated 81 milliGRAM(s) Oral daily  docusate sodium 100 milliGRAM(s) Oral three times a day  folic acid 1 milliGRAM(s),dextrose 50 milliLiter(s) 1 milliGRAM(s) IV Intermittent daily  magnesium oxide 400 milliGRAM(s) Oral daily  metoprolol succinate ER 50 milliGRAM(s) Oral daily  metoprolol succinate ER 25 milliGRAM(s) Oral at bedtime  metroNIDAZOLE  IVPB      metroNIDAZOLE  IVPB 500 milliGRAM(s) IV Intermittent every 8 hours  pantoprazole  Injectable 40 milliGRAM(s) IV Push daily  piperacillin/tazobactam IVPB. 3.375 Gram(s) IV Intermittent every 12 hours  polyethylene glycol 3350 17 Gram(s) Oral daily  predniSONE   Tablet 5 milliGRAM(s) Oral daily  senna 2 Tablet(s) Oral at bedtime  sodium chloride 0.45% 1000 milliLiter(s) (75 mL/Hr) IV Continuous <Continuous>  sodium chloride 0.9% Bolus 500 milliLiter(s) IV Bolus every 15 minutes  sodium ferric gluconate complex IVPB 125 milliGRAM(s) IV Intermittent <User Schedule>  vancomycin    Solution 125 milliGRAM(s) Oral every 6 hours    MEDICATIONS  (PRN):  acetaminophen   Tablet 650 milliGRAM(s) Oral every 6 hours PRN pain fever  aluminum hydroxide/magnesium hydroxide/simethicone Suspension 30 milliLiter(s) Oral every 4 hours PRN Dyspepsia  morphine  - Injectable 2 milliGRAM(s) IV Push every 4 hours PRN Severe Pain (7 - 10)  morphine  - Injectable 2 milliGRAM(s) SubCutaneous every 6 hours PRN Severe Pain (7 - 10)  ondansetron Injectable 4 milliGRAM(s) IV Push every 6 hours PRN Nausea      REVIEW OF SYSTEMS      General:	No fever or chills    Skin/Breast: No jaundice or rash   		  ENMT: denies sore throat or thrush    Respiratory and Thorax: Denies dyspnea or cough or shortness of breath  	  Cardiovascular: Denies chest pain or palpitations 	    Gastrointestinal: Denies jaundice or pruritis    Genitourinary: Denies dysuria or hematuria	    Musculoskeletal: Denies muscular pain or swelling	    Neurological: Denies confusion or tremor	    Hematology/Lymphatics: Denies easy bruising or bleeding 	    Endocrine:	Denies polyphagia or polyuria    See above hx otherwise neg for any major organ systems    PHYSICAL EXAM:    Vital Signs Last 24 Hrs  T(C): 37.1 (24 Jun 2018 05:28), Max: 37.1 (24 Jun 2018 05:28)  T(F): 98.7 (24 Jun 2018 05:28), Max: 98.7 (24 Jun 2018 05:28)  HR: 111 (24 Jun 2018 05:28) (87 - 157)  BP: 105/76 (23 Jun 2018 21:54) (105/76 - 115/47)  BP(mean): --  RR: 16 (24 Jun 2018 05:28) (16 - 18)  SpO2: 90% (24 Jun 2018 05:28) (90% - 97%)    Constitutional: no acute distress    ENMT: NC/AT scl anicteric opm pink no lesions     Neck: supple. No jvd or LN    Respiratory: Clear     Cardiovascular: RRR s1s2     Gastrointestinal: Pos bs , soft  but diffusely distended and mildly diffuse tenderness       Back: No CVA tenderness    Extremities: NO cce     Neurological: Alert and oriented x 3     Skin: No rash or jaundice    Date/Time:06-23 @ 06:11    ALT/SGPT: 30  Albumin: 2.0  AST/SGOT: 27  Bilirubin Direct: 0.4  Bilirubin Total: 0.9  Ca: 8.8  eGFR : 26  eGFR Non-: 22  Lipase: 72  Amylase: --  INR: --  PTT: --        Date/Time:06-22 @ 06:16    ALT/SGPT: --  Albumin: --  AST/SGOT: --  Bilirubin Direct: --  Bilirubin Total: --  Ca: 8.4  eGFR : 40  eGFR Non-: 35  Lipase: --  Amylase: --  INR: --  PTT: --            06-23    137  |  108  |  57<H>  ----------------------------<  173<H>  4.8   |  17<L>  |  2.21<H>    Ca    8.8      23 Jun 2018 06:11  Phos  5.9     06-23  Mg     2.2     06-23    TPro  5.4<L>  /  Alb  2.0<L>  /  TBili  0.9  /  DBili  0.4<H>  /  AST  27  /  ALT  30  /  AlkPhos  65  06-23                            11.0   0.69  )-----------( 10       ( 23 Jun 2018 06:11 )             32.8

## 2018-06-24 NOTE — PROGRESS NOTE ADULT - SUBJECTIVE AND OBJECTIVE BOX
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  Pt reports diffuse metastasis throughout the chest and abdomen, yet does not suffer chronic abdominal pain.  Pt c/o abdominal pain, diffuse throughout the entire abdomen since about 6pm after eating.  +nausea with vomiting x 4.  No diarrhea.  +chronic constipation.  Has a Rx for oxycodone, which she has never taken, until tonight when she took a dose which did not provide relief.  Past surgical hx significant for hernia repair.  Pain is located in the epigastric area and lower abd with vaginal irradiation at times; relieved by morphine. Had a temp of 101 in ED and received Dapto  Pt is off Imuran due to recent chemo; she received Etoposide and Carboplatin 1 week ago at [facility]. (21 Jun 2018 10:20)    6/22: louann was transferred yesterday from [facility] for tachycardia.  She continues to have generalized abdominal pain.  this morning labs showed pancytopenia; Called Dr Francois's offce [phone] and spoke with the nurse who will fax over the information  6/23:  continues to have abodminal pain; last bowel movement was monday. Seen by ID and placed on ABX and neutropenic precautions  addendum:  Patient was seen and examine by me. Called by Dr Dickinson regarding Air in the CT abdomen.    Discussed with patient and  and at this time patient does not want the surgery.  They understands the risk perioperatively and postoperatively.  She is neutropenic, plt of 10, renal failure with kidney transplant, CHF with moderate pericardial effusion. she is not a candidate for surgery. Patient and family agree to comfort care.  DNR/DNI was signed  6/24: patient and  is think about home hospice versus inpatient hospice.  Continues to have abdominal pain    Allergies:  bacitracin (Rash)  Levaquin (Sedation/Somnol; Muscle Pain)  tetracyclines (Other)    REVIEW OF SYSTEMS:  See HPI. All other review of systems is negative unless indicated above.     OBJECTIVE  Physical Exam:  Vital Signs Last 24 Hrs  T(C): 37.1 (24 Jun 2018 05:28), Max: 37.1 (24 Jun 2018 05:28)  T(F): 98.7 (24 Jun 2018 05:28), Max: 98.7 (24 Jun 2018 05:28)  HR: 111 (24 Jun 2018 05:28) (87 - 157)  BP: 105/76 (23 Jun 2018 21:54) (105/76 - 115/47)  BP(mean): --  RR: 16 (24 Jun 2018 05:28) (16 - 18)  SpO2: 90% (24 Jun 2018 05:28) (90% - 97%)    Constitutional: NAD, awake and alert, well-developed  Neurological: AAO x 3, no focal deficits  HEENT: PERRLA, EOMI, MMM  Neck: Soft and supple, No LAD, No JVD  Respiratory: Breath sounds are clear bilaterally, No wheezing, rales or rhonchi  Cardiovascular: S1 and S2, regular rate and rhythm; no Murmurs, gallops or rubs  Gastrointestinal: Bowel Sounds present, soft, generalized tenderness, +distended, + guarding, no rebound tenderness  Back: No CVA tenderness   Extremities: No peripheral edema  Vascular: 2+ peripheral pulses  Musculoskeletal: 5/5 strength b/l upper and lower extremities  Skin: No rashes  Breast: Deferred  Rectal: Deferred    MEDICATIONS  (STANDING):  aspirin enteric coated 81 milliGRAM(s) Oral daily  cefepime  Injectable. 1000 milliGRAM(s) IV Push every 12 hours  docusate sodium 100 milliGRAM(s) Oral three times a day  furosemide   Injectable 40 milliGRAM(s) IV Push once  magnesium oxide 400 milliGRAM(s) Oral daily  metoprolol succinate ER 25 milliGRAM(s) Oral daily  pantoprazole  Injectable 40 milliGRAM(s) IV Push daily  predniSONE   Tablet 5 milliGRAM(s) Oral daily  sodium chloride 0.9% Bolus 500 milliLiter(s) IV Bolus every 15 minutes  vancomycin    Solution 125 milliGRAM(s) Oral every 6 hours    Lab Results:  CBC  CBC Full  -  ( 23 Jun 2018 06:11 )  WBC Count : 0.69 K/uL  Hemoglobin : 11.0 g/dL  Hematocrit : 32.8 %  Platelet Count - Automated : 10 K/uL  Mean Cell Volume : 93.2 fl  Mean Cell Hemoglobin : 31.3 pg  Mean Cell Hemoglobin Concentration : 33.5 gm/dL  Auto Neutrophil # : 0.30 K/uL  Auto Lymphocyte # : 0.22 K/uL  Auto Monocyte # : 0.14 K/uL  Auto Eosinophil # : 0.00 K/uL  Auto Basophil # : 0.00 K/uL  Auto Neutrophil % : 44.0 %  Auto Lymphocyte % : 32.0 %  Auto Monocyte % : 20.0 %  Auto Eosinophil % : 0.0 %  Auto Basophil % : 0.0 %    .		Differential:	[] Automated		[] Manual  Chemistry                        11.0   0.69  )-----------( 10       ( 23 Jun 2018 06:11 )             32.8     06-23    137  |  108  |  57<H>  ----------------------------<  173<H>  4.8   |  17<L>  |  2.21<H>    Ca    8.8      23 Jun 2018 06:11  Phos  5.9     06-23  Mg     2.2     06-23    MICROBIOLOGY/CULTURES:  Culture Results:   No growth (06-21 @ 01:10)  Culture Results:   No growth to date. (06-21 @ 01:01)  Culture Results:   No growth to date. (06-21 @ 00:53)      RADIOLOGY RESULTS:      < from: Transthoracic Echocardiogram (06.22.18 @ 08:09) >     Impression     Summary     Limited study to asses pericardial effusion.   Estimated left ventricular ejection fraction is 40 %, function is   decreased since prior study 05/08/2018.   Prominent D shape of the interventricular septum in both systole and   diastole suggestive of RV pressure and volume overload.     Signature     ----------------------------------------------------------------   Electronically signed by Veronica Black MD(Interpreting   physician) on 06/22/2018 02:08 PM   ----------------------------------------------------------------    < end of copied text >    RADIOLOGY/EKG:    < from: Xray Abdomen 2 View PORTABLE -Urgent (06.21.18 @ 11:00) >    IMPRESSION:    Nonobstructive bowel gas pattern. Moderate ascending colonic fecal   retention.    < end of copied text >    < from: Xray Chest 1 View AP/PA. (06.20.18 @ 23:41) >    FINDINGS/  IMPRESSION:          Pulmonary vascular congestion and tiny bilateral pleural effusions are   present. Cardiomegaly.      < end of copied text >    < from: CT Abdomen and Pelvis No Cont (06.21.18 @ 00:36) >    IMPRESSION:  No evidence of obstruction or definite evidence of bowel inflammation.    Small right pleural effusion with moderate pericardial effusion. Small   volume ascites and anasarca.    No other interval change from prior exam. Polycystic kidney and liver   disease.    < end of copied text >    < from: CT Abdomen and Pelvis w/ Oral Cont (06.23.18 @ 16:50) >    IMPRESSION:  Moderate amount of scattered free air within the upper and mid abdomen,   new since prior exam, of unknown origin. Small amount of free fluid in   the perihepatic region is unchanged.Small new focus of ascites in the   left lateral mid abdomen. Moderate free fluid in the pelvis.  Diffusely   dilated small bowel loops without a distinct transition point. Oral   contrast passes into the large bowel. These findings likely reflect an  ileus, however an obstruction cannot be excluded. No definite CT evidence   of diverticulitis.    Small to moderate right pleural effusion and small left pleural effusion   with underlying compressive atelectasis is markedly worsened since prior   exam. Moderate pericardial effusion is unchanged..    < end of copied text >

## 2018-06-24 NOTE — PROVIDER CONTACT NOTE (OTHER) - ACTION/TREATMENT ORDERED:
hold IVF till AM, re-evaluate with day MD team. ok to give IVF for 12hrs and have it re-evaluated by day team.

## 2018-06-25 ENCOUNTER — TRANSCRIPTION ENCOUNTER (OUTPATIENT)
Age: 68
End: 2018-06-25

## 2018-06-25 RX ORDER — ASPIRIN/CALCIUM CARB/MAGNESIUM 324 MG
1 TABLET ORAL
Qty: 0 | Refills: 0 | COMMUNITY

## 2018-06-25 RX ORDER — SIMVASTATIN 20 MG/1
1 TABLET, FILM COATED ORAL
Qty: 0 | Refills: 0 | COMMUNITY

## 2018-06-25 RX ORDER — MORPHINE SULFATE 50 MG/1
2 CAPSULE, EXTENDED RELEASE ORAL
Qty: 0 | Refills: 0 | COMMUNITY
Start: 2018-06-25

## 2018-06-25 RX ORDER — MORPHINE SULFATE 50 MG/1
2 CAPSULE, EXTENDED RELEASE ORAL
Qty: 0 | Refills: 0 | Status: DISCONTINUED | OUTPATIENT
Start: 2018-06-25 | End: 2018-06-25

## 2018-06-25 RX ORDER — ONDANSETRON 8 MG/1
4 TABLET, FILM COATED ORAL
Qty: 0 | Refills: 0 | COMMUNITY
Start: 2018-06-25

## 2018-06-25 RX ADMIN — Medication 100 MILLIGRAM(S): at 04:40

## 2018-06-25 RX ADMIN — MORPHINE SULFATE 2 MILLIGRAM(S): 50 CAPSULE, EXTENDED RELEASE ORAL at 19:07

## 2018-06-25 RX ADMIN — MORPHINE SULFATE 2 MILLIGRAM(S): 50 CAPSULE, EXTENDED RELEASE ORAL at 04:39

## 2018-06-25 RX ADMIN — MORPHINE SULFATE 2 MILLIGRAM(S): 50 CAPSULE, EXTENDED RELEASE ORAL at 09:29

## 2018-06-25 RX ADMIN — Medication 125 MILLIGRAM(S): at 06:00

## 2018-06-25 RX ADMIN — MORPHINE SULFATE 2 MILLIGRAM(S): 50 CAPSULE, EXTENDED RELEASE ORAL at 05:00

## 2018-06-25 RX ADMIN — PIPERACILLIN AND TAZOBACTAM 25 GRAM(S): 4; .5 INJECTION, POWDER, LYOPHILIZED, FOR SOLUTION INTRAVENOUS at 06:00

## 2018-06-25 RX ADMIN — MORPHINE SULFATE 2 MILLIGRAM(S): 50 CAPSULE, EXTENDED RELEASE ORAL at 09:14

## 2018-06-25 RX ADMIN — PANTOPRAZOLE SODIUM 40 MILLIGRAM(S): 20 TABLET, DELAYED RELEASE ORAL at 12:08

## 2018-06-25 NOTE — DISCHARGE NOTE ADULT - CARE PLAN
Principal Discharge DX:	Fever, unspecified fever cause  Goal:	Fever and Abdominal Pain 2ndry to Perforated Bowel and Peritonitis  Assessment and plan of treatment:	transfer to inpatient hospice  Goal:	Pancytopenia most likely due to chemo for merckel cell carcinoma

## 2018-06-25 NOTE — DISCHARGE NOTE ADULT - HOSPITAL COURSE
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  Pt reports diffuse metastasis throughout the chest and abdomen, yet does not suffer chronic abdominal pain.  Pt c/o abdominal pain, diffuse throughout the entire abdomen since about 6pm after eating.  +nausea with vomiting x 4.  No diarrhea.  +chronic constipation.  Has a Rx for oxycodone, which she has never taken, until tonight when she took a dose which did not provide relief.  Past surgical hx significant for hernia repair.  Pain is located in the epigastric area and lower abd with vaginal irradiation at times; relieved by morphine. Had a temp of 101 in ED and received Dapto  Pt is off Imuran due to recent chemo; she received Etoposide and Carboplatin 1 week ago at [facility]. (21 Jun 2018 10:20)    6/22: louann was transferred yesterday from [facility] for tachycardia.  She continues to have generalized abdominal pain.  this morning labs showed pancytopenia; Called Dr Francois's offce [phone] and spoke with the nurse who will fax over the information  6/23:  continues to have abodminal pain; last bowel movement was monday. Seen by ID and placed on ABX and neutropenic precautions  addendum:  Patient was seen and examine by me. Called by Dr Dickinson regarding Air in the CT abdomen.    Discussed with patient and  and at this time patient does not want the surgery.  They understands the risk perioperatively and postoperatively.  She is neutropenic, plt of 10, renal failure with kidney transplant, CHF with moderate pericardial effusion. she is not a candidate for surgery. Patient and family agree to comfort care.  DNR/DNI was signed  6/24: patient and  is think about home hospice versus inpatient hospice.  Continues to have abdominal pain  6/25: Patient decided on inpatient hospice.  Awaiting a bed. Meds were D/C     Pancytopenia most likely due to chemo  -transfer to med surg - comfort care   -S/P 2 units of PRBC (6/22)  -S/P 1 unit of platelets (6/23)  -plt transfusion if <10K or signs of any bleeding  -Neutropenic precautions  -[facility] Evaluation - ?neulasta    *Fever and Abdominal Pain 2ndry to Perforated Bowel and Peritonitis  -CT abd  no contrast - NAD  -CT Abd with Oral contrast - Moderate air and ileus  -GI and surgery consult appreciated  -comfort care  -ID consult appreciated  -neutropenic precautions  -S/P cefepime 1 gram q 12 hours   -continue po vancomycin to prevent cdif  -switched to Zosyn and Flagyl    *Tachycardia 2ndry to anemia and possible infection  -continue tele  -increase Metoprolol 50 Am and 25 PM, if BP permits   -cardio consult appreciated     *Moderate pericardial effusion, Ascites and ansarca on CT 2ndry to Chronic Systolic CHF  -hold Diuretic due to low BP and ARF  -ECHO Estimated left ventricular ejection fraction is 40 %, function is decreased since prior study 05/08/2018  -cardio consult appreciated      *Urinary Retention  -[place] Placed  -TOV once stable    *Merkel cell carcinoma.  -On Chemo: Carboplatin and Etoposide   -patients outside [facility] Dr Gutierrez [phone]    *CKD stage 3 - Cr near baseline 1.5 - 1.6 while on lasix  -h/o Renal transplant - not on any immunosupression due to acitve malignancy  -Renal Fx is stable   -Continue prednisone  -Avoid nephrotoxic agents   -renal consult appreciated    *HTN  -Monitor BP, stable   -Hold amlodipine   -continue Metoprolol    *DVT PPXs: Cannot give chemical Anticoagulation due to very low platelet.    *Discussed with patient and  and at this time patient does not want the surgery.  Transfer to inpatient hospice today. Stop Blood draws, vitals, tele monitor and oral meds

## 2018-06-25 NOTE — DISCHARGE NOTE ADULT - PLAN OF CARE
Fever and Abdominal Pain 2ndry to Perforated Bowel and Peritonitis transfer to inpatient hospice Pancytopenia most likely due to chemo for merckel cell carcinoma

## 2018-06-25 NOTE — DISCHARGE NOTE ADULT - PATIENT PORTAL LINK FT
You can access the [facility] Patient Portal, offered by [facility], by registering with the following website: [email or web address]

## 2018-06-25 NOTE — PHYSICAL THERAPY INITIAL EVALUATION ADULT - DIAGNOSIS, PT EVAL
Pancytopenia, fever, abd pain, tachycardia, mod pleural effusion, urinary retention. merkel cell carcinoma.

## 2018-06-25 NOTE — PROGRESS NOTE ADULT - SUBJECTIVE AND OBJECTIVE BOX
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  Pt reports diffuse metastasis throughout the chest and abdomen, yet does not suffer chronic abdominal pain.  Pt c/o abdominal pain, diffuse throughout the entire abdomen since about 6pm after eating.  +nausea with vomiting x 4.  No diarrhea.  +chronic constipation.  Has a Rx for oxycodone, which she has never taken, until tonight when she took a dose which did not provide relief.  Past surgical hx significant for hernia repair.  Pain is located in the epigastric area and lower abd with vaginal irradiation at times; relieved by morphine. Had a temp of 101 in ED and received Dapto  Pt is off Imuran due to recent chemo; she received Etoposide and Carboplatin 1 week ago at [facility]. (21 Jun 2018 10:20)    6/22: louann was transferred yesterday from [facility] for tachycardia.  She continues to have generalized abdominal pain.  this morning labs showed pancytopenia; Called Dr Francois's offce [phone] and spoke with the nurse who will fax over the information  6/23:  continues to have abodminal pain; last bowel movement was monday. Seen by ID and placed on ABX and neutropenic precautions  addendum:  Patient was seen and examine by me. Called by Dr Dickinson regarding Air in the CT abdomen.    Discussed with patient and  and at this time patient does not want the surgery.  They understands the risk perioperatively and postoperatively.  She is neutropenic, plt of 10, renal failure with kidney transplant, CHF with moderate pericardial effusion. she is not a candidate for surgery. Patient and family agree to comfort care.  DNR/DNI was signed  6/24: patient and  is think about home hospice versus inpatient hospice.  Continues to have abdominal pain  6/25: Patient decided on inpatient hospice.  Awaiting a bed. Meds were D/C     Allergies:  bacitracin (Rash)  Levaquin (Sedation/Somnol; Muscle Pain)  tetracyclines (Other)    REVIEW OF SYSTEMS:  See HPI. All other review of systems is negative unless indicated above.     OBJECTIVE  Physical Exam:  Vital Signs Last 24 Hrs  T(C): 37.1 (24 Jun 2018 05:28), Max: 37.1 (24 Jun 2018 05:28)  T(F): 98.7 (24 Jun 2018 05:28), Max: 98.7 (24 Jun 2018 05:28)  HR: 111 (24 Jun 2018 05:28) (87 - 157)  BP: 105/76 (23 Jun 2018 21:54) (105/76 - 115/47)  BP(mean): --  RR: 16 (24 Jun 2018 05:28) (16 - 18)  SpO2: 90% (24 Jun 2018 05:28) (90% - 97%)    Constitutional: NAD, awake and alert, well-developed  Neurological: AAO x 3, no focal deficits  HEENT: PERRLA, EOMI, MMM  Neck: Soft and supple, No LAD, No JVD  Respiratory: Breath sounds are clear bilaterally, No wheezing, rales or rhonchi  Cardiovascular: S1 and S2, regular rate and rhythm; no Murmurs, gallops or rubs  Gastrointestinal: Bowel Sounds present, soft, +generalized tenderness, +distended, + guarding, + rebound tenderness  Back: No CVA tenderness   Extremities: No peripheral edema  Vascular: 2+ peripheral pulses  Musculoskeletal: 5/5 strength b/l upper and lower extremities  Skin: No rashes  Breast: Deferred  Rectal: Deferred    MEDICATIONS  (STANDING):  aspirin enteric coated 81 milliGRAM(s) Oral daily  cefepime  Injectable. 1000 milliGRAM(s) IV Push every 12 hours  docusate sodium 100 milliGRAM(s) Oral three times a day  furosemide   Injectable 40 milliGRAM(s) IV Push once  magnesium oxide 400 milliGRAM(s) Oral daily  metoprolol succinate ER 25 milliGRAM(s) Oral daily  pantoprazole  Injectable 40 milliGRAM(s) IV Push daily  predniSONE   Tablet 5 milliGRAM(s) Oral daily  sodium chloride 0.9% Bolus 500 milliLiter(s) IV Bolus every 15 minutes  vancomycin    Solution 125 milliGRAM(s) Oral every 6 hours    Lab Results:  CBC  CBC Full  -  ( 23 Jun 2018 06:11 )  WBC Count : 0.69 K/uL  Hemoglobin : 11.0 g/dL  Hematocrit : 32.8 %  Platelet Count - Automated : 10 K/uL  Mean Cell Volume : 93.2 fl  Mean Cell Hemoglobin : 31.3 pg  Mean Cell Hemoglobin Concentration : 33.5 gm/dL  Auto Neutrophil # : 0.30 K/uL  Auto Lymphocyte # : 0.22 K/uL  Auto Monocyte # : 0.14 K/uL  Auto Eosinophil # : 0.00 K/uL  Auto Basophil # : 0.00 K/uL  Auto Neutrophil % : 44.0 %  Auto Lymphocyte % : 32.0 %  Auto Monocyte % : 20.0 %  Auto Eosinophil % : 0.0 %  Auto Basophil % : 0.0 %    .		Differential:	[] Automated		[] Manual  Chemistry                        11.0   0.69  )-----------( 10       ( 23 Jun 2018 06:11 )             32.8     06-23    137  |  108  |  57<H>  ----------------------------<  173<H>  4.8   |  17<L>  |  2.21<H>    Ca    8.8      23 Jun 2018 06:11  Phos  5.9     06-23  Mg     2.2     06-23    MICROBIOLOGY/CULTURES:  Culture Results:   No growth (06-21 @ 01:10)  Culture Results:   No growth to date. (06-21 @ 01:01)  Culture Results:   No growth to date. (06-21 @ 00:53)      RADIOLOGY RESULTS:      < from: Transthoracic Echocardiogram (06.22.18 @ 08:09) >     Impression     Summary     Limited study to asses pericardial effusion.   Estimated left ventricular ejection fraction is 40 %, function is   decreased since prior study 05/08/2018.   Prominent D shape of the interventricular septum in both systole and   diastole suggestive of RV pressure and volume overload.     Signature     ----------------------------------------------------------------   Electronically signed by Veronica Black MD(Interpreting   physician) on 06/22/2018 02:08 PM   ----------------------------------------------------------------    < end of copied text >    RADIOLOGY/EKG:    < from: Xray Abdomen 2 View PORTABLE -Urgent (06.21.18 @ 11:00) >    IMPRESSION:    Nonobstructive bowel gas pattern. Moderate ascending colonic fecal   retention.    < end of copied text >    < from: Xray Chest 1 View AP/PA. (06.20.18 @ 23:41) >    FINDINGS/  IMPRESSION:          Pulmonary vascular congestion and tiny bilateral pleural effusions are   present. Cardiomegaly.      < end of copied text >    < from: CT Abdomen and Pelvis No Cont (06.21.18 @ 00:36) >    IMPRESSION:  No evidence of obstruction or definite evidence of bowel inflammation.    Small right pleural effusion with moderate pericardial effusion. Small   volume ascites and anasarca.    No other interval change from prior exam. Polycystic kidney and liver   disease.    < end of copied text >    < from: CT Abdomen and Pelvis w/ Oral Cont (06.23.18 @ 16:50) >    IMPRESSION:  Moderate amount of scattered free air within the upper and mid abdomen,   new since prior exam, of unknown origin. Small amount of free fluid in   the perihepatic region is unchanged.Small new focus of ascites in the   left lateral mid abdomen. Moderate free fluid in the pelvis.  Diffusely   dilated small bowel loops without a distinct transition point. Oral   contrast passes into the large bowel. These findings likely reflect an  ileus, however an obstruction cannot be excluded. No definite CT evidence   of diverticulitis.    Small to moderate right pleural effusion and small left pleural effusion   with underlying compressive atelectasis is markedly worsened since prior   exam. Moderate pericardial effusion is unchanged..    < end of copied text >

## 2018-06-25 NOTE — PHYSICAL THERAPY INITIAL EVALUATION ADULT - PERTINENT HX OF CURRENT PROBLEM, REHAB EVAL
Pt admitted to  secondary to fever. Pt with a hx of Merkel cell carcinoma with diffuse metastases as per pt.

## 2018-06-25 NOTE — DISCHARGE NOTE ADULT - OTHER SIGNIFICANT FINDINGS
< from: Transthoracic Echocardiogram (06.22.18 @ 08:09) >     Impression     Summary     Limited study to asses pericardial effusion.   Estimated left ventricular ejection fraction is 40 %, function is   decreased since prior study 05/08/2018.   Prominent D shape of the interventricular septum in both systole and   diastole suggestive of RV pressure and volume overload.     Signature     ----------------------------------------------------------------   Electronically signed by Veronica Black MD(Interpreting   physician) on 06/22/2018 02:08 PM   ----------------------------------------------------------------    < end of copied text >    RADIOLOGY/EKG:    < from: Xray Abdomen 2 View PORTABLE -Urgent (06.21.18 @ 11:00) >    IMPRESSION:    Nonobstructive bowel gas pattern. Moderate ascending colonic fecal   retention.    < end of copied text >    < from: Xray Chest 1 View AP/PA. (06.20.18 @ 23:41) >    FINDINGS/  IMPRESSION:          Pulmonary vascular congestion and tiny bilateral pleural effusions are   present. Cardiomegaly.      < end of copied text >    < from: CT Abdomen and Pelvis No Cont (06.21.18 @ 00:36) >    IMPRESSION:  No evidence of obstruction or definite evidence of bowel inflammation.    Small right pleural effusion with moderate pericardial effusion. Small   volume ascites and anasarca.    No other interval change from prior exam. Polycystic kidney and liver   disease.    < end of copied text >    < from: CT Abdomen and Pelvis w/ Oral Cont (06.23.18 @ 16:50) >    IMPRESSION:  Moderate amount of scattered free air within the upper and mid abdomen,   new since prior exam, of unknown origin. Small amount of free fluid in   the perihepatic region is unchanged.Small new focus of ascites in the   left lateral mid abdomen. Moderate free fluid in the pelvis.  Diffusely   dilated small bowel loops without a distinct transition point. Oral   contrast passes into the large bowel. These findings likely reflect an  ileus, however an obstruction cannot be excluded. No definite CT evidence   of diverticulitis.    Small to moderate right pleural effusion and small left pleural effusion   with underlying compressive atelectasis is markedly worsened since prior   exam. Moderate pericardial effusion is unchanged..    < end of copied text >

## 2018-06-25 NOTE — DISCHARGE NOTE ADULT - MEDICATION SUMMARY - MEDICATIONS TO TAKE
I will START or STAY ON the medications listed below when I get home from the hospital:    morphine  -- 2 milligram(s) intravenous every 2 hours, As Needed Shortness of breath or severe pain  -- Indication: For comfort care    ondansetron 2 mg/mL injectable solution  -- 4 milligram(s) injectable every 8 hours, As Needed nausea  -- Indication: For nausea

## 2018-06-25 NOTE — DISCHARGE NOTE ADULT - MEDICATION SUMMARY - MEDICATIONS TO STOP TAKING
I will STOP taking the medications listed below when I get home from the hospital:    simvastatin 20 mg oral tablet  -- 1 tab(s) by mouth once a day (at bedtime)    aspirin 81 mg oral tablet  -- 1 tab(s) by mouth once a day    predniSONE 5 mg oral tablet  -- 1 tab(s) by mouth once a day    acetaminophen 325 mg oral tablet  -- 2 tab(s) by mouth every 6 hours, As needed, For pain    metoprolol tartrate 75 mg oral tablet  -- 1 tab(s) by mouth every 12 hours    vancomycin 125 mg oral capsule  -- 1 cap(s) by mouth every 6 hours   -- Finish all this medication unless otherwise directed by prescriber.    magnesium oxide 400 mg (241.3 mg elemental magnesium) oral tablet  -- 1 tab(s) by mouth once a day    amoxicillin-clavulanate 500 mg-125 mg oral tablet  -- 1 tab(s) by mouth 2 times a day    potassium phosphate-sodium phosphate 305 mg-700 mg oral tablet  -- 1 tab(s) by mouth once a day

## 2018-06-25 NOTE — PROGRESS NOTE ADULT - PROVIDER SPECIALTY LIST ADULT
Cardiology
Cardiology
Gastroenterology
Hospitalist
Infectious Disease
Infectious Disease
Nephrology
Surgery

## 2018-06-25 NOTE — PROGRESS NOTE ADULT - ASSESSMENT
*Pancytopenia most likely due to chemo  -transfer to med surg - comfort care   -S/P 2 units of PRBC (6/22)  -S/P 1 unit of platelets (6/23)  -plt transfusion if <10K or signs of any bleeding  -Neutropenic precautions  -[facility] Evaluation - ?neulasta    *Fever and Abdominal Pain 2ndry to Perforated Bowel and Peritonitis  -CT abd  no contrast - NAD  -CT Abd with Oral contrast - Moderate air and ileus  -GI and surgery consult appreciated  -comfort care  -ID consult appreciated  -neutropenic precautions  -S/P cefepime 1 gram q 12 hours   -continue po vancomycin to prevent cdif  -switched to Zosyn and Flagyl    *Tachycardia 2ndry to anemia and possible infection  -continue tele  -increase Metoprolol 50 Am and 25 PM, if BP permits   -cardio consult appreciated     *Moderate pericardial effusion, Ascites and ansarca on CT 2ndry to Chronic Systolic CHF  -hold Diuretic due to low BP and ARF  -ECHO Estimated left ventricular ejection fraction is 40 %, function is decreased since prior study 05/08/2018  -cardio consult appreciated      *Urinary Retention  -Manning Placed  -TOV once stable    *Merkel cell carcinoma.  -On Chemo: Carboplatin and Etoposide   -patients outside [facility] Dr Gutierrez [phone]    *CKD stage 3 - Cr near baseline 1.5 - 1.6 while on lasix  -h/o Renal transplant - not on any immunosupression due to acitve malignancy  -Renal Fx is stable   -Continue prednisone  -Avoid nephrotoxic agents   -renal consult appreciated    *HTN  -Monitor BP, stable   -Hold amlodipine   -continue Metoprolol    *DVT PPXs: Cannot give chemical Anticoagulation due to very low platelet.    *Discussed with patient and  and at this time patient does not want the surgery.  Transfer to inpatient hospice today. Stop Blood draws, vitals, tele monitor and oral meds

## 2018-07-02 DIAGNOSIS — Z94.0 KIDNEY TRANSPLANT STATUS: ICD-10-CM

## 2018-07-02 DIAGNOSIS — I31.3 PERICARDIAL EFFUSION (NONINFLAMMATORY): ICD-10-CM

## 2018-07-02 DIAGNOSIS — Z79.82 LONG TERM (CURRENT) USE OF ASPIRIN: ICD-10-CM

## 2018-07-02 DIAGNOSIS — R33.9 RETENTION OF URINE, UNSPECIFIED: ICD-10-CM

## 2018-07-02 DIAGNOSIS — E78.5 HYPERLIPIDEMIA, UNSPECIFIED: ICD-10-CM

## 2018-07-02 DIAGNOSIS — C79.89 SECONDARY MALIGNANT NEOPLASM OF OTHER SPECIFIED SITES: ICD-10-CM

## 2018-07-02 DIAGNOSIS — Z79.899 OTHER LONG TERM (CURRENT) DRUG THERAPY: ICD-10-CM

## 2018-07-02 DIAGNOSIS — Q61.3 POLYCYSTIC KIDNEY, UNSPECIFIED: ICD-10-CM

## 2018-07-02 DIAGNOSIS — N18.3 CHRONIC KIDNEY DISEASE, STAGE 3 (MODERATE): ICD-10-CM

## 2018-07-02 DIAGNOSIS — I50.22 CHRONIC SYSTOLIC (CONGESTIVE) HEART FAILURE: ICD-10-CM

## 2018-07-02 DIAGNOSIS — R00.0 TACHYCARDIA, UNSPECIFIED: ICD-10-CM

## 2018-07-02 DIAGNOSIS — I13.0 HYPERTENSIVE HEART AND CHRONIC KIDNEY DISEASE WITH HEART FAILURE AND STAGE 1 THROUGH STAGE 4 CHRONIC KIDNEY DISEASE, OR UNSPECIFIED CHRONIC KIDNEY DISEASE: ICD-10-CM

## 2018-07-02 DIAGNOSIS — K63.1 PERFORATION OF INTESTINE (NONTRAUMATIC): ICD-10-CM

## 2018-07-02 DIAGNOSIS — C16.9 MALIGNANT NEOPLASM OF STOMACH, UNSPECIFIED: ICD-10-CM

## 2018-07-02 DIAGNOSIS — E44.1 MILD PROTEIN-CALORIE MALNUTRITION: ICD-10-CM

## 2018-07-02 DIAGNOSIS — R50.9 FEVER, UNSPECIFIED: ICD-10-CM

## 2018-07-02 DIAGNOSIS — D61.810 ANTINEOPLASTIC CHEMOTHERAPY INDUCED PANCYTOPENIA: ICD-10-CM

## 2018-07-02 DIAGNOSIS — D69.59 OTHER SECONDARY THROMBOCYTOPENIA: ICD-10-CM

## 2018-07-02 DIAGNOSIS — K65.9 PERITONITIS, UNSPECIFIED: ICD-10-CM

## 2019-07-21 NOTE — PROVIDER CONTACT NOTE (OTHER) - REASON
Consult cardio
Infectious Disease Consult
MD order: GI consult
Nephrology Consult
cardio consult
elevated lactate
heart rate still 120s, pulse ox is 94 on 2 lpm of 02
pt has ivf ordered but was not transferred to 1n with them running
Hematology/Oncology Consult
The resident's documentation has been prepared under my direction and personally reviewed by me in its entirety. I confirm that the note above accurately reflects all work, treatment, procedures, and medical decision making performed by me.  Aimee Alfaro, DO

## 2019-07-29 NOTE — PATIENT PROFILE ADULT. - FALL HARM RISK CONCLUSION
Bhakti Squires is a 48 year old female presenting to the Urgent Care today for a lesion on her face that appeared when she woke up with morning. Patient states that there is no pain or itch but she says there is a sort of tingle. Patient is not sure if its a bite or not. Patient is getting  this weekend.       Denies known Latex allergy or symptoms of Latex sensitivity.    Allergies were reviewed and updated if necessary.    Medications have been reconciled with patient.     Pharmacy reviewed and updated to Patient's preference.     Patient would like communication of their results via:        Cell Phone:   Telephone Information:   Mobile [phone]     Okay to leave a message containing results? Yes    Tobacco use verified: 7/29/2019           Universal Safety Interventions

## 2020-01-30 NOTE — PHYSICAL THERAPY INITIAL EVALUATION ADULT - DID THE PATIENT HAVE SURGERY?
Progress Note (short form)





- Note


Progress Note: 





PULMONARY





Short of breath, cough, wheezing when attempting to ambulate.





 Vital Signs











 Period  Temp  Pulse  Resp  BP Sys/Spence  Pulse Ox


 


 Last 24 Hr  97.9 F-98.9 F    20-22  161-167/79-90  95-96











Gen:  tachypneic at rest


Heart: RRR


Lung: scattered rhonchi, wheezes


Abd: soft, nontender


Ext: + edema





 CBC, BMP





 01/29/20 05:35 





 01/30/20 05:30 





Active Medications





Acetaminophen (Tylenol -)  650 mg PO Q6H PRN


   PRN Reason: MODERATE PAIN (4-7)


Albuterol Sulfate (Ventolin 0.083% Nebulizer Soln -)  1 amp NEB Q4H PRN


   PRN Reason: SHORT OF BREATH/WHEEZING


Albuterol/Ipratropium (Duoneb -)  1 amp NEB RQID [facility]


   Last Admin: 01/30/20 07:33 Dose:  1 amp


Anastrozole (Arimidex -)  1 mg PO DAILY [facility]


   Last Admin: 01/29/20 10:26 Dose:  1 mg


Aspirin (Asa -)  81 mg PO DAILY [facility]


   Last Admin: 01/30/20 10:10 Dose:  81 mg


Atorvastatin Calcium (Lipitor -)  10 mg PO HS [facility]


   Last Admin: 01/29/20 21:19 Dose:  10 mg


Clotrimazole (Lotrisone Cream (Small Tube))  1 applic TP BID [facility]


   Last Admin: 01/30/20 10:11 Dose:  1 applic


Furosemide (Lasix -)  20 mg PO DAILY [facility]


   Last Admin: 01/30/20 10:10 Dose:  20 mg


Gabapentin (Neurontin -)  300 mg PO TID [facility]


   Last Admin: 01/30/20 06:21 Dose:  300 mg


Guaifenesin (Diabetic Tussin Dm -)  10 ml PO Q4H [facility]


   Last Admin: 01/30/20 05:32 Dose:  10 ml


Heparin Sodium (Porcine) (Heparin -)  5,000 unit SQ BID [facility]


   Last Admin: 01/30/20 10:10 Dose:  5,000 unit


Hydrochlorothiazide (Hctz -)  12.5 mg PO DAILY [facility]


   Last Admin: 01/30/20 10:10 Dose:  12.5 mg


Ceftriaxone Sodium 1 gm/ (Dextrose)  50 mls @ 100 mls/hr IVPB DAILY [facility]


   Last Admin: 01/30/20 10:11 Dose:  100 mls/hr


Azithromycin (Zithromax 500mg Ivpb (Pre-Docked))  500 mg in 250 mls @ 250 mls/

hr IVPB DAILY [facility]


   Last Admin: 01/30/20 10:11 Dose:  250 mls/hr


Levothyroxine Sodium (Synthroid -)  150 mcg PO AM [facility]


   Last Admin: 01/30/20 06:22 Dose:  150 mcg


Losartan Potassium (Cozaar -)  50 mg PO DAILY [facility]


   Last Admin: 01/30/20 10:10 Dose:  50 mg


Methylprednisolone Sodium Succinate (Solu-Medrol -)  40 mg IVPUSH Q8H-IV [facility]


   Last Admin: 01/30/20 10:11 Dose:  40 mg


Metoprolol Tartrate (Lopressor -)  25 mg PO BID [facility]


   Last Admin: 01/30/20 10:13 Dose:  25 mg


Multi-Ingredient Ointment (Zinc Oxide)  1 applic TP BID [facility]


   Last Admin: 01/30/20 10:11 Dose:  1 applic











A/P


URI


Acute COPD Exacerbation


Sepsis


Lactic Acidosis


Acute on Chronic Renal Failure


HTN


Hyperlipidemia


Anemia





-  on empiric antibiotics


-  continue medrol at current dose


-  inhaled bronchodilators


-  O2 to keep Spo2 >90%


-  monitor urine output, creatinine


-  DVT prophylaxis





Problem List





- Problems


(1) COPD with acute exacerbation


Code(s): J44.1 - CHRONIC OBSTRUCTIVE PULMONARY DISEASE W (ACUTE) EXACERBATION n/a

## 2020-07-02 NOTE — PROGRESS NOTE ADULT - PROBLEM SELECTOR PLAN 1
Patient is requesting referral to pain clinic for menstrual cramps.  She was provided # to clinic and her last visit notes were faxed.   Chronic; stable on serial imaging; arrhythmias likely secondary to effusion. outpatient f/u with Dr. Aguilar.  Would not continue diuretic due to the tendency for dehydration.  Repeat echo as outpatient to monitor.

## 2020-08-17 NOTE — PATIENT PROFILE ADULT. - NS PRO MODE OF ARRIVAL
Pre-Operative Diagnosis: Spinal stenosis of lumbar region without neurogenic claudication [M48.061]     Post-Operative Diagnosis: Spinal stenosis of lumbar region without neurogenic claudication [M48.061]      Procedure Performed:   Procedure(s):  Lumbar 4 stretcher

## 2022-05-11 NOTE — ED ADULT NURSE NOTE - TEMPLATE LIST FOR HEAD TO TOE ASSESSMENT
[facility]  Provider Name:  Dr Chari Camacho     Credentials:  MINE Flaherty    PATIENT'S NAME: Makeda Sanches  PRONOUNS: She/Her  MRN: [id number]  : 2001  ADDRESS: [address] [place] [id number]  [facility] NUM[facility]:  [id number]  DATE OF SERVICE: 22  START TIME: 1307  END TIME: 1322  PREFERRED PHONE: [phone] May we leave a program related message: Yes  SERVICE MODALITY:  Video Visit:      Provider verified identity through the following two step process.  Patient provided:  Patient     Telemedicine Visit: The patient's condition can be safely assessed and treated via synchronous audio and visual telemedicine encounter.      Reason for Telemedicine Visit: Services only offered telehealth    Originating Site (Patient Location): Patient's home    Distant Site (Provider Location): Provider Remote Setting- Home Office    Consent:  The patient/guardian has verbally consented to: the potential risks and benefits of telemedicine (video visit) versus in person care; bill my insurance or make self-payment for services provided; and responsibility for payment of non-covered services.     Patient would like the video invitation sent by:  My Chart    Mode of Communication:  Video Conference via Amwell    As the provider I attest to compliance with applicable laws and regulations related to telemedicine.    UNIVERSAL ADULT Mental Health DIAGNOSTIC ASSESSMENT    Identifying Information:  Ms. Sanches is a 20-year-old,  woman; she spoke English, female pronouns were used, and she identified no cultural considerations for treatment. The clinical interviews took place via telemedicine due to the Corona Virus outbreak. This writer gathered her background information at the time of each session.     Client's Statement of Presenting Concern:  Ms. Sanches was referred for an Attention Deficit Hyperactivity Disorder Evaluation Dr. Kate Acosta on 2021 by due to poor concentration and  inattention. The purpose of the evaluation was to provide an opinion regarding possible mental health issues or deficits and treatment recommendations.     History of Presenting Concern:  Ms. Sanches indicated is not engaged in individual therapy yet has been prescribed Adderall. She asserted she has not been diagnosed with any mental health issues; a diagnostic assessment has not been completed. She acknowledged she was not doing well in her classes and there was  no explanation for it  so she and her provider wondered if she suffered from attention deficit. She stated she struggles to remember and complete tasks. She stated that symptom impair her functioning at school and work. She highlighted that when she started taking stimulant medication, her grades improved and she has  more motivation to get things done.     Background Information:  Developmental History  Ms. Sanches was the second of a pair of twins born to her parents. She was raised by her parents in [place], [place] along with two younger sisters. She described her childhood as  pretty good.  She highlighted that  nothing very traumatic stands out.  She asserted that her parents seem to have a  very good.  She denied witnessing incidents of domestic violence. She recalled playing with her sister a lot and having  everything [she] could ask for.  She stated she felt loved and supported by her parents. She reported that when the girls misbehaved, they were sent to their rooms. She denied experiencing or witnessing childhood abuse.     Significant Relationships and Supports    Intimate Relationships  Ms. 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 She reported that the couple has been dating for over a year. She described the relationship as  pretty good.  She highlighted she has not had  anything negative happen  in the relationship. She denied incidents of domestic violence. She acknowledged she can be  really annoying and  weird  at times, and he  puts up with it.      Relationships with Family Members  Ms. Sanches indicated she continues to reside with her parent and younger sister. She stated that her relationships with her parents have been  up and down  because they have set more expectations. She highlighted that relationship with her sisters remain close. She asserted that her twin sister is currently enrolled at [employer].     Relationships with Peers  Ms. Sanches identified five individuals whom she calls a close friend. She asserted that her friends have all moved away for college, but she has contact with them daily via social media. She denied feeling lonely.     Educational & Occupational History  Ms. Sanches graduated from [employer] in the year 2020. She reported she earned  As and Bs with a few mixed in Cs.  She acknowledged her dropped slightly during the COVID-19 pandemic and classes were held virtually. She stated she struggled with social studies courses while she excelled in mathematics classes. She highlighted she enrolled in an honors Danish course and did well. She asserted she got along  pretty good  with teachers. She acknowledged difficulty getting along with peers and switching friend groups. She recalled not  agreeing with things they would do  and the relationships not being as close. She reported she was a member of the hockey and softball teams.     Following high school, Ms. Sanches planned to attend [employer]. She reported that when the time came to go,  it didn t feel like the right time,  so she took a year off and worked as a  and . She indicated that in the fall of 2021, she enrolled at [employer]. She stated she lives at home. She asserted that the first semester went  okay.  She recalled earnings As, Bs and Cs for grades. She acknowledged having difficulty getting to class and motivation to start homework. She reported she associated with  peers but did not establish any new friendship. She indicated that this semester she is doing better because she likes her classes. She added that when she takes her medication, she gets up and goes to class as well as gets chores done at home. She stated she is taking generals and has not declared a major. She added she continues to work as a  part-time.     Assessments:  PHQ9:   PHQ-9 SCORE 12/9/2020   PHQ-A Total Score 0     GAD7: No flowsheet data found.  CAGE-AID:   CAGE-AID Total Score 4/26/2022   Total Score 0   Total Score MyChart 0 (A total score of 2 or greater is considered clinically significant)     PROMIS 10-Global Health (all questions and answers displayed):   PROMIS 10 5/11/2022   In general, would you say your health is: Good   In general, would you say your quality of life is: Very good   In general, how would you rate your physical health? Good   In general, how would you rate your mental health, including your mood and your ability to think? Very good   In general, how would you rate your satisfaction with your social activities and relationships? Very good   In general, please rate how well you carry out your usual social activities and roles Good   To what extent are you able to carry out your everyday physical activities such as walking, climbing stairs, carrying groceries, or moving a chair? Completely   How often have you been bothered by emotional problems such as feeling anxious, depressed or irritable? Rarely   How would you rate your fatigue on average? Moderate   How would you rate your pain on average?   0 = No Pain  to  10 = Worst Imaginable Pain 0   In general, would you say your health is: 3   In general, would you say your quality of life is: 4   In general, how would you rate your physical health? 3   In general, how would you rate your mental health, including your mood and your ability to think? 4   In general, how would you rate your satisfaction with your social  activities and relationships? 4   In general, please rate how well you carry out your usual social activities and roles. (This includes activities at home, at work and in your community, and responsibilities as a parent, child, spouse, employee, friend, etc.) 3   To what extent are you able to carry out your everyday physical activities such as walking, climbing stairs, carrying groceries, or moving a chair? 5   In the past 7 days, how often have you been bothered by emotional problems such as feeling anxious, depressed, or irritable? 2   In the past 7 days, how would you rate your fatigue on average? 3   In the past 7 days, how would you rate your pain on average, where 0 means no pain, and 10 means worst imaginable pain? 0   Global Mental Health Score 16   Global Physical Health Score 16   PROMIS TOTAL - SUBSCORES 32   Some recent data might be hidden     Mental Health History:  Ms. Sanches stated that anxiety symptoms emerged in high school. She recalled feeling anxious and worrying about not completing homework assignments. She asserted she  worries about money quite a bit  but not more than other college students. She denied symptoms of social anxiety or panic. She indicated she has never experienced a depressive episode. She reported no history of suicidal ideations or self-injurious behaviors.     Patient's Strengths and Limitations  Patient identified the following strengths or resources that will help them succeed in treatment: motivation. Things that may interfere with their success in treatment include mental health symptoms.     Health/Medical History:  Ms. Sanches described her physical health as average. She stated she has  high blood pressure sometimes.  She denied being diagnosed with a chronic medication condition or pain. She outlined no concerns regarding sexual or reproductive health. She acknowledged that her sleep is  not great.  She indicated she has difficulty falling asleep because she is  playing on her phone and her mind is racing. She added that she has been experiencing sleep paralysis. She reported she feels rested upon waking. She admitted that her diet  could be better because a lot of it is take out.  She denied a history of disordered eating. She indicated she has cut caffeine with use of stimulant medication    Patient does report a family history of mental health concerns.  Patient reports family history includes Hypertension in her father and paternal grandmother; Hypothyroidism in her mother.     Patient reports current meds as:   Outpatient Medications Marked as Taking for the 5/11/22 encounter (Appointment) with Chari Camacho, PhD LP   Medication Sig     amphetamine-dextroamphetamine (ADDERALL XR) 10 MG 24 hr capsule Take 1 capsule (10 mg) by mouth daily     amphetamine-dextroamphetamine (ADDERALL XR) 30 MG 24 hr capsule Take 1 capsule (30 mg) by mouth daily       Medication Adherence:  Patient reports taking prescribed medications as prescribed.    Patient Allergies:  No Known Allergies    Medical History:  No past medical history on file.    Current Mental Status Exam:   Appearance:  Appropriate    Eye Contact:  Good   Psychomotor:  Normal       Gait / station:  Not assessed  Attitude / Demeanor: Cooperative   Speech      Rate / Production: Normal/ Responsive      Volume:  Normal  volume      Language:  intact  Mood:   Normal  Affect:   Appropriate    Thought Content: Clear   Thought Process: Coherent  Goal Directed       Associations: No loosening of associations  Insight:   Good   Judgment:  Intact   Orientation:  All  Attention/concentration: Good    Substance Use History:  Ms. Sanches stated she consumed alcohol for the first time at the age of 17 years old. As a high school roxanna and senior, she recalled drinking every other weekend. She indicated that her alcohol use decreased when her friends moved away. She denied a problematic pattern of alcohol use. She reported she  smoked cannabis for the first time at the age of 17 years. She asserted she has smoked and ingested marijuana a handful of time. She asserted she has never abused other illegal drugs or prescription medications. She added she vaped nicotine for a year but she since stopped.     Trauma History:  Ms. Sanches denied witnessing or experiencing domestic violence, childhood abuse, a sexual assault, violent crime or life-threatening event.     Risk Taking Behaviors:  Ms. Sanches reported a history of the following risk-taking behaviors: excessive spending.     Motor Vehicle Operation:  Ms. Sanches stated she was issued her 's license at the age of 17 years old. She denied ever being cited for speeding yet was cited for driving without insurance. She reported she easily gets lost and misses turns. She expressed the belief that others feel safe riding in the car with her while she is driving.      Safety Assessment:   Patient denies current homicidal ideation and behaviors.  Patient denies current self-injurious ideation and behaviors.    Patient denied risk behaviors associated with substance use.  Patient denies any high risk behaviors associated with mental health symptoms.  Patient reports the following current concerns for their personal safety: None.  Patient reports there are not firearms in the house.         History of Safety Concerns:  Patient denied a history of homicidal ideation.     Patient denied a history of personal safety concerns.    Patient denied a history of assaultive behaviors.    Patient denied a history of sexual assault behaviors.     Patient denied a history of risk behaviors associated with substance use.  Patient denies any history of high risk behaviors associated with mental health symptoms.  Patient reports the following protective factors: forward or future oriented thinking; safe and stable environment; living with other people; structured day; effective problem solving skills;  commitment to well being; positive social skills; healthy fear of risky behaviors or pain    Risk Plan:  See Recommendations for Safety and Risk Management Plan    Review of Symptoms per patient report:  Depression: No symptoms  Hollie:  No Symptoms  Psychosis: No Symptoms  Anxiety: Sleep disturbance and Ruminations  Panic:  No symptoms  Post Traumatic Stress Disorder:  No Symptoms   Eating Disorder: No Symptoms  ADD / ADHD:  Inattentive, Poor task completion, Distractibility, Forgetful, Impulsive, Restlessness/fidgety and Hyperverbal  Conduct Disorder: No symptoms  Autism Spectrum Disorder: No symptoms  Obsessive Compulsive Disorder: No Symptoms    Patient reports the following compulsive behaviors and treatment history: Picking - has not had treatment..      Diagnostic Criteria:   Rule Out ADHD  Anxiety/Skin Picking    Functional Status:  Patient reports the following functional impairments:  academic performance, educational activities, health maintenance, management of the household and or completion of tasks, money management, relationship(s), self-care, social interactions and work / vocational responsibilities.     Nonprogrammatic care:  Patient is requesting basic services to address current mental health concerns.    Clinical Summary:  1. Reason for assessment: ADHD Eval  .  2. Psychosocial, Cultural and Contextual Factors: None  .  3. Provisional Diagnosis: Rule Out ADHD  4. Prognosis: Maintain Current Status / Prevent Deterioration.  5. Likely consequences of symptoms if not treated: chronic.  6. Client strengths include:  motivated and support of family, friends and providers .     Recommendations:     1. Plan for Safety and Risk Management:   Recommended that patient call 911 or go to the local ED should there be a change in any of these risk factors..          Report to child / adult protection services was NA.     2. Patient's identified no cultural considerations.     3. Initial Treatment will focus  on:    ADHD Testing:  Patient was given self and collaborative rating scales to be completed prior to the next appointment.  Depression and anxiety rating scales were completed.  Copies of current report cards were requested. .     4. Resources/Service Plan:    services are not indicated.   Modifications to assist communication are not indicated.   Additional disability accommodations are not indicated.      5. Collaboration:   Collaboration / coordination of treatment will be initiated as needed with the following  support professionals: primary care physician.      6.  Referrals:   The following referral(s) will be initiated: None.      A Release of Information has been obtained for the following: None.    7. JAY:    JAY:  Discussed the general effects of drugs and alcohol on health and well-being. Provider gave patient printed information about the effects of chemical use on their health and well being. Recommendations:  Abstain from use .     8. Records:   These were reviewed at time of assessment.   Information in this assessment was obtained from the medical record and  provided by patient who is a good historian.    Patient will have open access to their mental health medical record.        Provider Name/ Credentials:  Dr Camacho  May 11, 2022         General

## 2022-08-03 NOTE — PATIENT PROFILE ADULT. - FUNCTIONAL SCREEN CURRENT LEVEL: BATHING, MLM
Airway       Patient location during procedure: OR       Procedure Start/Stop Times: 8/3/2022 1:53 PM  Staff -        Anesthesiologist:  Pacheco Ernandez MD       CRNA: Kait Quintero APRN CRNA       Performed By: CRNAIndications and Patient Condition       Indications for airway management: garfield-procedural       Induction type:intravenous       Mask difficulty assessment: 1 - vent by mask    Final Airway Details       Final airway type: endotracheal airway       Successful airway: ETT - single  Endotracheal Airway Details        ETT size (mm): 7.0       Cuffed: yes       Successful intubation technique: video laryngoscopy       VL Blade Size: MAC 3       Grade View of Cords: 1       Adjucts: stylet       Position: Right       Measured from: gums/teeth       Secured at (cm): 22       Bite block used: None    Post intubation assessment        Placement verified by: capnometry, equal breath sounds and chest rise        Number of attempts at approach: 1       Secured with: silk tape       Ease of procedure: easy       Dentition: Intact and Unchanged    Medication(s) Administered   Medication Administration Time: 8/3/2022 1:53 PM       (0) independent

## 2022-10-03 NOTE — H&P ADULT - LYMPHATICS COMMENTS
Render Risk Assessment In Note?: no unable to check neck LAD, pt has lesion over left neck from radiation, would not allow me to fully examine. Left side neck LN removed Detail Level: Simple Additional Notes: Pt will email a pic of the UPT today

## 2023-01-13 NOTE — PATIENT PROFILE ADULT. - FUNCTIONAL SCREEN CURRENT LEVEL: DRESSING, MLM
Detail Level: Detailed
Number Of Treatment Areas: 1
Radiation Port Type: convergent
Number Of Blocks: 0
Block Type: Simple
(0) independent

## 2023-11-26 NOTE — PATIENT PROFILE ADULT. - PAIN CHRONIC, PROFILE
Initial / Assessment/Plan of Care Note     Baseline Assessment  81 year old admitted 11/24/2023 as Inpatient with a diagnosis of sepsis. Prior to admission patient was living with Children and residing at House    .  Patient does  have a Power of  for Healthcare.  Document is activated.  Agent is daughter Deanne Carey from 2021 living will. RN to determine if any newer directives. Patient’s Primary Care Provider is Kristi Sawyer MD.     Progress Note  Re-admission. Daughter primary caregiver.  Case discussed with attending.   Requested [facility] resumption orders. CM dept following    Plan  Patient/Family Discharge Goal: Home Care   Is patient/family goal achievable: Yes    SW/CM - Recommendations for Discharge: Home based palliative, Home care, Home therapy   PT - Recommendations for Discharge:      Last Filed Values     None        OT - Recommendations for Discharge:      Last Filed Values     None        SLP - Recommendations for Discharge:    Last Filed Values     None          Barriers to Discharge  Identified Barriers to Discharge/Transition Planning: Medical necessity for acute care        Anticipate patient will need post-hospital services. Necessary services are available.  Anticipate patient can return to the environment from which patient entered the hospital.   Anticipate patient cannot provide self-care at discharge.    Refer to / Flowsheet for objective data.     Medical History  Past Medical History:   Diagnosis Date   • Arthritis    • Cerebral infarction (CMD)    • CHF (congestive heart failure) (CMD)    • Depression    • Diverticulosis    • DM (diabetes mellitus) (CMD)    • Fracture     right hip over 5 years ago from 2022   • GERD (gastroesophageal reflux disease)    • HA (headache)     per her daughter Deanne, he wants to see if the l. temporal artery is what is causing her 1x a week headaches   • Hiatal hernia    • Hyperlipidemia    • Hypertension    • Hypothyroidism    • Kidney  disease    • Left-sided weakness 2021    s/p stroke - no memory issues at this time 11-   • Malignant neoplasm (CMD)    • Pulmonary fibrosis (CMD)    • SOB (shortness of breath) on exertion    • Solar lentigo    • Vitamin D deficiency    • Walker as ambulation aid     always   • Wears glasses     bifocals       Prior to Admission Status  Functional Status  Ambulation: Walker  Bathing: Shower, Sponge Bath, Family  Dressing: Family  Toileting: Independent/Self  Meal Preparation: Family  Shopping: Family  Medication Preparation: Family  Medication Administration: Family  Housekeeping: Family  Laundry: Family  Transportation: Family    Agency/Support  Type of Services Prior to Hospitalization: Nurse (specify), PT ([facility])               Support Systems: Children   Home Devices/Equipment: CPAP/BiPAP machine (T), Blood glucose monitor, Blood pressure monitor         Mobility Assist Devices: Standard walker  Sensory Support Devices: Eyeglasses, Dentures      Current Status  PT Ambulation Tips:    PT Transfer Tips:     OT Bathing Tips:    OT Dressing Tips:    OT Toileting Tips:    OT Feeding Tips:    SLP Swallow/Feeding Tips:    SLP Comm/Cog Tips:    Current Mental Status: Alert, Oriented to Person, Oriented to Place, Oriented to Reason for Hospitalization, Oriented to Time  Stressors:      Insurance  Primary:  [employer] MEDICARE ADVANTAGE  Secondary: N/A    Disposition Recommendations:  SW/CM recommendation for discharge: Home based palliative, Home care, Home therapy          yes

## 2024-01-16 NOTE — PATIENT PROFILE ADULT. - TEACHING/LEARNING CULTURAL CONSIDERATIONS
----- Message from Magdy Guerrero sent at 1/15/2024  6:35 PM EST -----  Regarding: update   Contact: [phone]  While the prednisone was reduced, the pimples have gone away and now when I go to the bathroom I have some involuntary contractions with my abdomen that cause a lot of pain.  --------------------message 2of2-----------------------------------    Good morning, happy new year, I am writing to inform you of my state of the disease, I am on 5 mg of prednizone and Tuesday is the last one, the last few weeks I have been improving considerably, but this last one I started to get worse, I have a lot of gasps, bleeding and I'm going to the bathroom practically every hour, I feel like I'm losing weight again        none

## 2024-04-02 NOTE — H&P ADULT - NSHPPOACENTRALVENOUSCATHETER_GEN_ALL_CORE
Patient is 22 year old male with a PMH of Crohn's disease who presents in follow up.       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 Per GI note from [place], Patient was diagnosed by capsule endoscopy with Crohn's disease, no confirmatory pathology. Did not have routine access to enteroscopy. Patient with long standing, refractory iron deficiency anemia requiring regular IV iron infusions since 2016. Initial symptoms prior to diagnosis include abdominal pain which improved with biweekly adalimumab subQ injections. Pill cam done on April 2021 revealed discrete area of small bowel ulceration and inflammation. Patient had multiple previous normal investigations including colonoscopy (10/06/20), EGD, MRE (2019 and 2022), and Meckle's scan (June 2020). Persistent moderately raised fecal calprotectin since 2017. Had no improvement with budesonide. 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 Recommended by his physician in [place] prior to coming to the [facility] to increase adalimumab 40mg Sub Q to weekly and continue IV iron infusions every 4 weeks.   Colonoscopy on 10/06/20 revealed no obvious inflammation in small bowel, erythematous nodules, large bowel entirely normal in appearance, bx revealed no obvious inflammation in small bowel and large bowel entirely normal in appearance. MRI small bowel with contrast 05/03/22 revealed no small bowel pathology identified. Slight increase in mesenteric lymph nodes, of indeterminate significance, possibly reactive    Negative Quantiferon Gold done August 2023. Due for second hepatitis B vaccine in December 2023. Last had a flu vaccine in 2022.  Labs: (2/02/23) Fecal christian protectin 248, (5/04/23) adalimumab 11.9, ESR 2  Patient was first seen in our office on 9/19/23. Labs from 9/19/23 - Total iron 11, TIBC 389, Sat % 3, ferritin 7, vitamin D 29, Fecal christian 192, Hgb 10.2. Iron infusion order was placed at that time but at his follow up visit in October patient had still not received an infusion. Copay for Venofer was $200 so patient decided to take iron supplements. He preferred to receive infusions at our office but Injectafer was denied. Patient was referred again to [facility] to receive Venofer IV, 200mg weekly x 5 weeks. First dose at the beginning of November, Last dose 12/01/23.  Labs 01/11/24  - Hgb 6.8, Hct 25.6. MCV 59.4, , iron studies pending  1/12/24, patient felt tired, slight dizziness, and shortness of breath with walking. In no acute distress. Noted he developed leg pain as he walked to the office. Reported of minimal abdominal discomfort yesterday. Denied diarrhea, constipation, and even hematochezia. Had daily BM. Denied fever, chills, night sweats. Normally, when he sat up he became dizzy and vision goes dark for a few seconds. More recently he noticed dizziness even while lying down. Patient reported 6 years ago he was on oral iron supplements which were of benefit but caused significant abdominal cramping, black stools, and constipation leading to a decreased appetite.   2/06/24, patient reports he will receive his third dose of Injectafer this Thursday at Dr. Patel's office. He does feel better and has no GI complaints. His fatigue improved. He is going to have repeat labs at the [facility]. Patient had stopped adalimumab since 11/17/23. Has restarted his weekly dose after his last OV.  Today, patient reports he had labs done at his [facility]. Hgb 9.8, Ferritin 4. Receiving Injectafer through Dr. Patel's office. Next dose this Friday. He will be traveling back home in May. When he eats at home he feels better. Fried chicken caused abdominal discomfort. Denies hematochezia or melena. He is c/w Humira subQ injections weekly.
no

## 2024-07-25 NOTE — PHYSICAL THERAPY INITIAL EVALUATION ADULT - HEALTH SCREEN CRITERIA
Mr. De Los Santos,    The allergy testing shows sensitization to Hilton tree pollen, cat and dog dander. Would you be willing to go the respiratory lab to have formal pulmonary function testing to use to support an application for a biologic drug to treat the asthma better? Please let me know.     Respiratory Region 8 ImmunoCAP 7-23-24:   Class 2: Cat, Hilton  Class 1: Dog  Please feel free to contact me with any questions or concerns by calling the clinic at [phone] or by sending a note through [employer].    Take Care,   Dr. Dorman       yes